# Patient Record
Sex: FEMALE | Race: BLACK OR AFRICAN AMERICAN | Employment: OTHER | URBAN - METROPOLITAN AREA
[De-identification: names, ages, dates, MRNs, and addresses within clinical notes are randomized per-mention and may not be internally consistent; named-entity substitution may affect disease eponyms.]

---

## 2017-09-24 ENCOUNTER — APPOINTMENT (OUTPATIENT)
Dept: GENERAL RADIOLOGY | Age: 67
DRG: 312 | End: 2017-09-24
Attending: EMERGENCY MEDICINE
Payer: MEDICARE

## 2017-09-24 ENCOUNTER — HOSPITAL ENCOUNTER (INPATIENT)
Age: 67
LOS: 3 days | Discharge: HOME HEALTH | DRG: 312 | End: 2017-09-27
Attending: EMERGENCY MEDICINE | Admitting: INTERNAL MEDICINE
Payer: MEDICARE

## 2017-09-24 ENCOUNTER — APPOINTMENT (OUTPATIENT)
Dept: CT IMAGING | Age: 67
DRG: 312 | End: 2017-09-24
Attending: EMERGENCY MEDICINE
Payer: MEDICARE

## 2017-09-24 DIAGNOSIS — R00.1 SYMPTOMATIC BRADYCARDIA: ICD-10-CM

## 2017-09-24 DIAGNOSIS — R55 SYNCOPE AND COLLAPSE: Primary | ICD-10-CM

## 2017-09-24 DIAGNOSIS — R41.82 ALTERED MENTAL STATUS, UNSPECIFIED ALTERED MENTAL STATUS TYPE: ICD-10-CM

## 2017-09-24 DIAGNOSIS — E87.6 HYPOKALEMIA: ICD-10-CM

## 2017-09-24 PROBLEM — G20 PARKINSON'S DISEASE (HCC): Status: ACTIVE | Noted: 2017-09-24

## 2017-09-24 LAB
ALBUMIN SERPL-MCNC: 3.4 G/DL (ref 3.4–5)
ALBUMIN/GLOB SERPL: 0.8 {RATIO} (ref 0.8–1.7)
ALP SERPL-CCNC: 63 U/L (ref 45–117)
ALT SERPL-CCNC: 14 U/L (ref 13–56)
ANION GAP SERPL CALC-SCNC: 10 MMOL/L (ref 3–18)
APPEARANCE UR: ABNORMAL
AST SERPL-CCNC: 18 U/L (ref 15–37)
BACTERIA URNS QL MICRO: ABNORMAL /HPF
BASOPHILS # BLD: 0 K/UL (ref 0–0.06)
BASOPHILS NFR BLD: 0 % (ref 0–2)
BILIRUB SERPL-MCNC: 0.7 MG/DL (ref 0.2–1)
BILIRUB UR QL: NEGATIVE
BNP SERPL-MCNC: 111 PG/ML (ref 0–900)
BUN SERPL-MCNC: 17 MG/DL (ref 7–18)
BUN/CREAT SERPL: 17 (ref 12–20)
CALCIUM SERPL-MCNC: 8.9 MG/DL (ref 8.5–10.1)
CHLORIDE SERPL-SCNC: 107 MMOL/L (ref 100–108)
CK MB CFR SERPL CALC: 0.7 % (ref 0–4)
CK MB SERPL-MCNC: 1.1 NG/ML (ref 5–25)
CK SERPL-CCNC: 153 U/L (ref 26–192)
CO2 SERPL-SCNC: 24 MMOL/L (ref 21–32)
COLOR UR: YELLOW
CREAT SERPL-MCNC: 1.02 MG/DL (ref 0.6–1.3)
DIFFERENTIAL METHOD BLD: ABNORMAL
EOSINOPHIL # BLD: 0 K/UL (ref 0–0.4)
EOSINOPHIL NFR BLD: 1 % (ref 0–5)
EPITH CASTS URNS QL MICRO: ABNORMAL /LPF (ref 0–5)
ERYTHROCYTE [DISTWIDTH] IN BLOOD BY AUTOMATED COUNT: 13.3 % (ref 11.6–14.5)
GLOBULIN SER CALC-MCNC: 4.2 G/DL (ref 2–4)
GLUCOSE BLD STRIP.AUTO-MCNC: 145 MG/DL (ref 70–110)
GLUCOSE SERPL-MCNC: 79 MG/DL (ref 74–99)
GLUCOSE UR STRIP.AUTO-MCNC: NEGATIVE MG/DL
HCT VFR BLD AUTO: 36.9 % (ref 35–45)
HGB BLD-MCNC: 12 G/DL (ref 12–16)
HGB UR QL STRIP: NEGATIVE
HYALINE CASTS URNS QL MICRO: ABNORMAL /LPF (ref 0–2)
KETONES UR QL STRIP.AUTO: NEGATIVE MG/DL
LEUKOCYTE ESTERASE UR QL STRIP.AUTO: ABNORMAL
LYMPHOCYTES # BLD: 1.4 K/UL (ref 0.9–3.6)
LYMPHOCYTES NFR BLD: 36 % (ref 21–52)
MAGNESIUM SERPL-MCNC: 1.9 MG/DL (ref 1.6–2.6)
MCH RBC QN AUTO: 28.7 PG (ref 24–34)
MCHC RBC AUTO-ENTMCNC: 32.5 G/DL (ref 31–37)
MCV RBC AUTO: 88.3 FL (ref 74–97)
MONOCYTES # BLD: 0.3 K/UL (ref 0.05–1.2)
MONOCYTES NFR BLD: 7 % (ref 3–10)
MUCOUS THREADS URNS QL MICRO: ABNORMAL /LPF
NEUTS SEG # BLD: 2.2 K/UL (ref 1.8–8)
NEUTS SEG NFR BLD: 56 % (ref 40–73)
NITRITE UR QL STRIP.AUTO: NEGATIVE
PH UR STRIP: >8.5 [PH] (ref 5–8)
PLATELET # BLD AUTO: 153 K/UL (ref 135–420)
PMV BLD AUTO: 11.2 FL (ref 9.2–11.8)
POTASSIUM SERPL-SCNC: 2.5 MMOL/L (ref 3.5–5.5)
PROT SERPL-MCNC: 7.6 G/DL (ref 6.4–8.2)
PROT UR STRIP-MCNC: NEGATIVE MG/DL
RBC # BLD AUTO: 4.18 M/UL (ref 4.2–5.3)
RBC #/AREA URNS HPF: ABNORMAL /HPF (ref 0–5)
SODIUM SERPL-SCNC: 141 MMOL/L (ref 136–145)
SP GR UR REFRACTOMETRY: 1.02 (ref 1–1.03)
TROPONIN I SERPL-MCNC: 0.12 NG/ML (ref 0–0.04)
UROBILINOGEN UR QL STRIP.AUTO: 1 EU/DL (ref 0.2–1)
WBC # BLD AUTO: 3.9 K/UL (ref 4.6–13.2)
WBC URNS QL MICRO: ABNORMAL /HPF (ref 0–4)

## 2017-09-24 PROCEDURE — 93005 ELECTROCARDIOGRAM TRACING: CPT

## 2017-09-24 PROCEDURE — 82550 ASSAY OF CK (CPK): CPT | Performed by: EMERGENCY MEDICINE

## 2017-09-24 PROCEDURE — 80053 COMPREHEN METABOLIC PANEL: CPT | Performed by: EMERGENCY MEDICINE

## 2017-09-24 PROCEDURE — 82962 GLUCOSE BLOOD TEST: CPT

## 2017-09-24 PROCEDURE — 70450 CT HEAD/BRAIN W/O DYE: CPT

## 2017-09-24 PROCEDURE — 81001 URINALYSIS AUTO W/SCOPE: CPT | Performed by: EMERGENCY MEDICINE

## 2017-09-24 PROCEDURE — 74011000258 HC RX REV CODE- 258: Performed by: EMERGENCY MEDICINE

## 2017-09-24 PROCEDURE — 87040 BLOOD CULTURE FOR BACTERIA: CPT | Performed by: EMERGENCY MEDICINE

## 2017-09-24 PROCEDURE — 65660000000 HC RM CCU STEPDOWN

## 2017-09-24 PROCEDURE — 74011250636 HC RX REV CODE- 250/636: Performed by: EMERGENCY MEDICINE

## 2017-09-24 PROCEDURE — 74011250637 HC RX REV CODE- 250/637: Performed by: EMERGENCY MEDICINE

## 2017-09-24 PROCEDURE — 74011250637 HC RX REV CODE- 250/637: Performed by: INTERNAL MEDICINE

## 2017-09-24 PROCEDURE — 85025 COMPLETE CBC W/AUTO DIFF WBC: CPT | Performed by: EMERGENCY MEDICINE

## 2017-09-24 PROCEDURE — 96361 HYDRATE IV INFUSION ADD-ON: CPT

## 2017-09-24 PROCEDURE — 96360 HYDRATION IV INFUSION INIT: CPT

## 2017-09-24 PROCEDURE — 83735 ASSAY OF MAGNESIUM: CPT | Performed by: EMERGENCY MEDICINE

## 2017-09-24 PROCEDURE — 71010 XR CHEST SNGL V: CPT

## 2017-09-24 PROCEDURE — 99285 EMERGENCY DEPT VISIT HI MDM: CPT

## 2017-09-24 PROCEDURE — 83880 ASSAY OF NATRIURETIC PEPTIDE: CPT | Performed by: EMERGENCY MEDICINE

## 2017-09-24 RX ORDER — POTASSIUM CHLORIDE 20 MEQ/1
40 TABLET, EXTENDED RELEASE ORAL 3 TIMES DAILY
Status: DISCONTINUED | OUTPATIENT
Start: 2017-09-24 | End: 2017-09-24

## 2017-09-24 RX ORDER — POTASSIUM CHLORIDE 20 MEQ/1
40 TABLET, EXTENDED RELEASE ORAL
Status: DISCONTINUED | OUTPATIENT
Start: 2017-09-24 | End: 2017-09-24

## 2017-09-24 RX ORDER — GUAIFENESIN 100 MG/5ML
81 LIQUID (ML) ORAL DAILY
COMMUNITY

## 2017-09-24 RX ORDER — MEMANTINE HYDROCHLORIDE 10 MG/1
10 TABLET ORAL 2 TIMES DAILY
COMMUNITY
End: 2018-01-03

## 2017-09-24 RX ORDER — HEPARIN SODIUM 5000 [USP'U]/ML
5000 INJECTION, SOLUTION INTRAVENOUS; SUBCUTANEOUS EVERY 8 HOURS
Status: DISCONTINUED | OUTPATIENT
Start: 2017-09-24 | End: 2017-09-27 | Stop reason: HOSPADM

## 2017-09-24 RX ORDER — SODIUM CHLORIDE 0.9 % (FLUSH) 0.9 %
5-10 SYRINGE (ML) INJECTION AS NEEDED
Status: DISCONTINUED | OUTPATIENT
Start: 2017-09-24 | End: 2017-09-27 | Stop reason: HOSPADM

## 2017-09-24 RX ORDER — POTASSIUM CHLORIDE 7.45 MG/ML
10 INJECTION INTRAVENOUS
Status: DISPENSED | OUTPATIENT
Start: 2017-09-24 | End: 2017-09-24

## 2017-09-24 RX ORDER — HYDROCHLOROTHIAZIDE 12.5 MG/1
12.5 CAPSULE ORAL DAILY
COMMUNITY
End: 2017-09-27

## 2017-09-24 RX ORDER — ASPIRIN 325 MG
325 TABLET ORAL DAILY
Status: DISCONTINUED | OUTPATIENT
Start: 2017-09-25 | End: 2017-09-27 | Stop reason: HOSPADM

## 2017-09-24 RX ORDER — RISPERIDONE 1 MG/1
2 TABLET, FILM COATED ORAL 2 TIMES DAILY
Status: DISCONTINUED | OUTPATIENT
Start: 2017-09-24 | End: 2017-09-27 | Stop reason: HOSPADM

## 2017-09-24 RX ORDER — DONEPEZIL HYDROCHLORIDE 10 MG/1
10 TABLET, FILM COATED ORAL DAILY
COMMUNITY
End: 2017-09-29

## 2017-09-24 RX ORDER — SODIUM CHLORIDE 9 MG/ML
75 INJECTION, SOLUTION INTRAVENOUS CONTINUOUS
Status: DISCONTINUED | OUTPATIENT
Start: 2017-09-24 | End: 2017-09-27 | Stop reason: HOSPADM

## 2017-09-24 RX ORDER — LANOLIN ALCOHOL/MO/W.PET/CERES
325 CREAM (GRAM) TOPICAL
Status: DISCONTINUED | OUTPATIENT
Start: 2017-09-24 | End: 2017-09-27 | Stop reason: HOSPADM

## 2017-09-24 RX ORDER — SODIUM CHLORIDE 0.9 % (FLUSH) 0.9 %
5-10 SYRINGE (ML) INJECTION EVERY 8 HOURS
Status: DISCONTINUED | OUTPATIENT
Start: 2017-09-24 | End: 2017-09-27 | Stop reason: HOSPADM

## 2017-09-24 RX ORDER — CITALOPRAM 20 MG/1
20 TABLET, FILM COATED ORAL
Status: DISCONTINUED | OUTPATIENT
Start: 2017-09-25 | End: 2017-09-27 | Stop reason: HOSPADM

## 2017-09-24 RX ORDER — LISINOPRIL 5 MG/1
TABLET ORAL DAILY
COMMUNITY
End: 2017-10-19

## 2017-09-24 RX ORDER — TRAZODONE HYDROCHLORIDE 50 MG/1
50 TABLET ORAL
Status: DISCONTINUED | OUTPATIENT
Start: 2017-09-24 | End: 2017-09-27 | Stop reason: HOSPADM

## 2017-09-24 RX ORDER — SIMVASTATIN 10 MG/1
TABLET, FILM COATED ORAL
COMMUNITY
End: 2017-10-16 | Stop reason: SDUPTHER

## 2017-09-24 RX ORDER — POTASSIUM CHLORIDE 1.5 G/1.77G
40 POWDER, FOR SOLUTION ORAL
Status: COMPLETED | OUTPATIENT
Start: 2017-09-24 | End: 2017-09-24

## 2017-09-24 RX ADMIN — CEFTRIAXONE SODIUM 2 G: 2 INJECTION, POWDER, FOR SOLUTION INTRAMUSCULAR; INTRAVENOUS at 15:57

## 2017-09-24 RX ADMIN — POTASSIUM CHLORIDE 40 MEQ: 1.5 POWDER, FOR SOLUTION ORAL at 16:06

## 2017-09-24 RX ADMIN — TRAZODONE HYDROCHLORIDE 50 MG: 50 TABLET ORAL at 21:26

## 2017-09-24 RX ADMIN — POTASSIUM CHLORIDE 10 MEQ: 10 INJECTION, SOLUTION INTRAVENOUS at 16:01

## 2017-09-24 RX ADMIN — SODIUM CHLORIDE 1000 ML: 9 INJECTION, SOLUTION INTRAVENOUS at 11:57

## 2017-09-24 RX ADMIN — SODIUM CHLORIDE 125 ML/HR: 900 INJECTION, SOLUTION INTRAVENOUS at 10:40

## 2017-09-24 RX ADMIN — Medication 10 ML: at 21:28

## 2017-09-24 NOTE — ED PROVIDER NOTES
HPI Comments: Brianna Rivera is a 79 y.o. female with hx of HTN, Parkinson's and CVA presenting to the ED via EMS due to a syncopal episode that occurred about 1 hour ago. Daughter at bedside assisting with hx. Daughter states caregiver called her hysterically at 9:30 AM today, stating that pt had fallen in the bathroom. Caregiver states pt had \"her hands on her face, but thought it was because pt was having a BM\". Caregiver heard \"a thump\" and went to check on pt, pt was on the floor. Caregiver reports pt was \"dazed out, slumped, shaking, mouth clamped down, but after a minute pt was able to respond to her questions and tell her she was ok\". Pt states she does not know or remember what happened. Denies smoking or ETOH. Pt has a baseline left-sided tremor. Past Medical History:   Diagnosis Date    Hypertension        No past surgical history on file. Family History:   Problem Relation Age of Onset    Hypertension Mother     Hypertension Father        Social History     Social History    Marital status:      Spouse name: N/A    Number of children: N/A    Years of education: N/A     Occupational History    Not on file. Social History Main Topics    Smoking status: Never Smoker    Smokeless tobacco: Not on file    Alcohol use No    Drug use: No    Sexual activity: Not on file     Other Topics Concern    Not on file     Social History Narrative    No narrative on file         ALLERGIES: Review of patient's allergies indicates no known allergies. Review of Systems   Constitutional: Negative for activity change, fatigue and fever. HENT: Negative for congestion and rhinorrhea. Eyes: Negative for visual disturbance. Respiratory: Negative for shortness of breath. Cardiovascular: Negative for chest pain and palpitations. Gastrointestinal: Negative for abdominal pain, diarrhea, nausea and vomiting. Genitourinary: Negative for dysuria and hematuria. Musculoskeletal: Negative for back pain. Skin: Negative for rash. Neurological: Positive for syncope. Negative for dizziness, weakness and light-headedness. Baseline left-sided tremor    All other systems reviewed and are negative. Vitals:    09/24/17 1021   BP: 136/74   Pulse: (!) 49   Resp: 16   Temp: 98.4 °F (36.9 °C)   SpO2: 100%            Physical Exam   Constitutional: She appears well-developed and well-nourished. No distress. L arm tremor    HENT:   Head: Normocephalic and atraumatic. Right Ear: External ear normal.   Left Ear: External ear normal.   Nose: Nose normal.   Mouth/Throat: Oropharynx is clear and moist.   Eyes: Conjunctivae and EOM are normal. Pupils are equal, round, and reactive to light. No scleral icterus. Neck: Normal range of motion. Neck supple. No JVD present. No tracheal deviation present. No thyromegaly present. Cardiovascular: Regular rhythm, normal heart sounds and intact distal pulses. Exam reveals no gallop and no friction rub. No murmur heard. [place]    Pulmonary/Chest: Effort normal and breath sounds normal. She exhibits no tenderness. Abdominal: Soft. Bowel sounds are normal. She exhibits no distension. There is no tenderness. There is no rebound and no guarding. Musculoskeletal: Normal range of motion. She exhibits no edema or tenderness. Lymphadenopathy:     She has no cervical adenopathy. Neurological: She is alert. No cranial nerve deficit. Coordination normal.   Oriented x 2, follows commands, L arm tremor is chronic, symmetric strength, gait not observed    Skin: Skin is dry. Cool    Psychiatric: She has a normal mood and affect. Her behavior is normal. Judgment and thought content normal.   Supportive daughter at the bedside    Nursing note and vitals reviewed.        MDM  Number of Diagnoses or Management Options  Diagnosis management comments: Pt is a 70yo female with a hx if HTH, dementia, remote hx of CVA presents with complaint of an episode of AMS. Pt was on the toilet and was bearing down per home care. Care team member left and then heard a change. She was slumped over and unresponsive. There was no diaphoresis or pallor noted. Pt began to talk again within minutes. Pt has had an episode of bradycardia and syncope in the past.  Pt in the ED resting HR is approx 50. Will CT head as she is at risk for seizure but suspect this is syncope given the clinical presentation. Will trend her cardiac labs, h/h, and then reevaluate.  Keaton Choi DO 10:45 AM      ED Course       Procedures      Vitals:  Patient Vitals for the past 12 hrs:   Temp Pulse Resp BP SpO2   09/24/17 1021 98.4 °F (36.9 °C) (!) 49 16 136/74 100 %       Medications Ordered:  Medications   0.9% sodium chloride infusion (not administered)   potassium chloride (K-DUR, KLOR-CON) SR tablet 40 mEq (not administered)   potassium chloride 10 mEq in 100 ml IVPB (not administered)   cefTRIAXone (ROCEPHIN) 2 g in 0.9% sodium chloride (MBP/ADV) 50 mL MBP (not administered)   sodium chloride 0.9 % bolus infusion 1,000 mL (1,000 mL IntraVENous New Bag 9/24/17 1157)       Lab Findings:  Recent Results (from the past 12 hour(s))   EKG, 12 LEAD, INITIAL    Collection Time: 09/24/17 10:18 AM   Result Value Ref Range    Ventricular Rate 52 BPM    Atrial Rate 52 BPM    P-R Interval 146 ms    QRS Duration 102 ms    Q-T Interval 504 ms    QTC Calculation (Bezet) 468 ms    Calculated P Axis 80 degrees    Calculated R Axis 29 degrees    Calculated T Axis 56 degrees    Diagnosis       Sinus bradycardia  Moderate voltage criteria for LVH, may be normal variant  Borderline ECG  When compared with ECG of 08-OCT-2014 15:36,  Nonspecific T wave abnormality no longer evident in Lateral leads     CBC WITH AUTOMATED DIFF    Collection Time: 09/24/17 10:25 AM   Result Value Ref Range    WBC 3.9 (L) 4.6 - 13.2 K/uL    RBC 4.18 (L) 4.20 - 5.30 M/uL    HGB 12.0 12.0 - 16.0 g/dL    HCT 36.9 35.0 - 45.0 %    MCV 88.3 74.0 - 97.0 FL    MCH 28.7 24.0 - 34.0 PG    MCHC 32.5 31.0 - 37.0 g/dL    RDW 13.3 11.6 - 14.5 %    PLATELET 597 916 - 240 K/uL    MPV 11.2 9.2 - 11.8 FL    NEUTROPHILS 56 40 - 73 %    LYMPHOCYTES 36 21 - 52 %    MONOCYTES 7 3 - 10 %    EOSINOPHILS 1 0 - 5 %    BASOPHILS 0 0 - 2 %    ABS. NEUTROPHILS 2.2 1.8 - 8.0 K/UL    ABS. LYMPHOCYTES 1.4 0.9 - 3.6 K/UL    ABS. MONOCYTES 0.3 0.05 - 1.2 K/UL    ABS. EOSINOPHILS 0.0 0.0 - 0.4 K/UL    ABS.  BASOPHILS 0.0 0.0 - 0.06 K/UL    DF AUTOMATED     URINALYSIS W/ RFLX MICROSCOPIC    Collection Time: 09/24/17 12:30 PM   Result Value Ref Range    Color YELLOW      Appearance CLOUDY      Specific gravity 1.019 1.005 - 1.030      pH (UA) >8.5 (H) 5.0 - 8.0    Protein NEGATIVE  NEG mg/dL    Glucose NEGATIVE  NEG mg/dL    Ketone NEGATIVE  NEG mg/dL    Bilirubin NEGATIVE  NEG      Blood NEGATIVE  NEG      Urobilinogen 1.0 0.2 - 1.0 EU/dL    Nitrites NEGATIVE  NEG      Leukocyte Esterase MODERATE (A) NEG     URINE MICROSCOPIC ONLY    Collection Time: 09/24/17 12:30 PM   Result Value Ref Range    WBC 4 to 10 0 - 4 /hpf    RBC 0 to 3 0 - 5 /hpf    Epithelial cells 2+ 0 - 5 /lpf    Bacteria 1+ (A) NEG /hpf    Mucus 1+ (A) NEG /lpf    Hyaline cast 0 to 3 0 - 2 /lpf   CARDIAC PANEL,(CK, CKMB & TROPONIN)    Collection Time: 09/24/17 12:53 PM   Result Value Ref Range     26 - 192 U/L    CK - MB 1.1 <3.6 ng/ml    CK-MB Index 0.7 0.0 - 4.0 %    Troponin-I, Qt. 0.12 (H) 0.0 - 0.045 NG/ML   MAGNESIUM    Collection Time: 09/24/17 12:53 PM   Result Value Ref Range    Magnesium 1.9 1.6 - 2.6 mg/dL   METABOLIC PANEL, COMPREHENSIVE    Collection Time: 09/24/17 12:53 PM   Result Value Ref Range    Sodium 141 136 - 145 mmol/L    Potassium 2.5 (LL) 3.5 - 5.5 mmol/L    Chloride 107 100 - 108 mmol/L    CO2 24 21 - 32 mmol/L    Anion gap 10 3.0 - 18 mmol/L    Glucose 79 74 - 99 mg/dL    BUN 17 7.0 - 18 MG/DL    Creatinine 1.02 0.6 - 1.3 MG/DL    BUN/Creatinine ratio 17 12 - 20 GFR est AA >60 >60 ml/min/1.73m2    GFR est non-AA 54 (L) >60 ml/min/1.73m2    Calcium 8.9 8.5 - 10.1 MG/DL    Bilirubin, total 0.7 0.2 - 1.0 MG/DL    ALT (SGPT) 14 13 - 56 U/L    AST (SGOT) 18 15 - 37 U/L    Alk. phosphatase 63 45 - 117 U/L    Protein, total 7.6 6.4 - 8.2 g/dL    Albumin 3.4 3.4 - 5.0 g/dL    Globulin 4.2 (H) 2.0 - 4.0 g/dL    A-G Ratio 0.8 0.8 - 1.7     NT-PRO BNP    Collection Time: 09/24/17 12:53 PM   Result Value Ref Range    NT pro- 0 - 900 PG/ML       EKG Interpretation by ED physician:    10:18 am: Sinus bradycardia. Rate of 52 bpm. No STEMI. X-ray, CT or radiology findings or impressions:  XR CHEST SNGL V   No acute pulmonary disease. Similar sclerosis bilateral humeral heads may be associated with tendinopathy or  avascular necrosis. CT HEAD WO CONT   1. No acute intracranial process. 2.  Similar severe sequela of chronic microvascular ischemic disease and remote  right thalamic infarct since 2014. 3.  Progressed mild to moderate generalized volume loss. Progress notes, consult notes, or additional procedure notes:    Given the bradycardia without rate slowing meds, syncope with electrolyte abnormalities will proceed with admission. 2:00 PM Consult: I discussed care with Dr. Gallo John (Hospitalist). It was a standard discussion including patient history, chief complaint, available diagnostic results, and predicted treatment course. Will admit pt to telemetry. Diagnosis: Syncope, hypokalemia, AMS    Disposition: Admit    Follow-up Information     None           Patient's Medications   Start Taking    No medications on file   Continue Taking    CITALOPRAM (CELEXA) 20 MG TABLET    Take 20 mg by mouth every morning. FERROUS SULFATE (IRON) 325 MG (65 MG IRON) EC TABLET    Take 1 tablet by mouth three (3) times daily (with meals). LISINOPRIL-HYDROCHLOROTHIAZIDE (PRINZIDE, ZESTORETIC) 20-12.5 MG PER TABLET    Take 1 tablet by mouth daily. RISPERIDONE (RISPERDAL) 2 MG TABLET    Take 2 mg by mouth two (2) times a day. TRAZODONE (DESYREL) 50 MG TABLET    Take 50 mg by mouth nightly as needed for Sleep. These Medications have changed    No medications on file   Stop Taking    No medications on file       Scribe Attestation      Ludmila Valente acting as a scribe for and in the presence of Anton Baptiste DO      September 24, 2017 at 11:02 AM       Provider Attestation:      I personally performed the services described in the documentation, reviewed the documentation, as recorded by the scribe in my presence, and it accurately and completely records my words and actions.  September 24, 2017 at 11:02 AM - Anton Baptiste DO

## 2017-09-24 NOTE — IP AVS SNAPSHOT
[address] Patient: Tosha Luther MRN: [id number] Current Discharge Medication List  
  
CONTINUE these medications which have NOT CHANGED Dose & Instructions Dispensing Information Comments Morning Noon Evening Bedtime  
 aspirin 81 mg chewable tablet Your last dose was: Your next dose is:    
   
   
 Dose:  81 mg Take 81 mg by mouth daily. Refills:  0  
     
   
   
   
  
 citalopram 20 mg tablet Commonly known as:  Amezcua Timothy Your last dose was: Your next dose is:    
   
   
 Dose:  20 mg Take 20 mg by mouth every morning. Refills:  0  
     
   
   
   
  
 donepezil 10 mg tablet Commonly known as:  ARICEPT Your last dose was: Your next dose is:    
   
   
 Dose:  10 mg Take 10 mg by mouth daily. Refills:  0  
     
   
   
   
  
 ferrous sulfate 325 mg (65 mg iron) EC tablet Commonly known as:  IRON Your last dose was: Your next dose is:    
   
   
 Dose:  325 mg Take 1 tablet by mouth three (3) times daily (with meals). Quantity:  30 tablet Refills:  0  
     
   
   
   
  
 lisinopril 5 mg tablet Commonly known as:  Harrison Palomo Your last dose was: Your next dose is: Take  by mouth daily. Refills:  0  
     
   
   
   
  
 memantine 10 mg tablet Commonly known as:  Babetta Darmicaela Your last dose was: Your next dose is:    
   
   
 Dose:  10 mg Take 10 mg by mouth two (2) times a day. Refills:  0  
     
   
   
   
  
 risperiDONE 2 mg tablet Commonly known as:  RisperDAL Your last dose was: Your next dose is:    
   
   
 Dose:  2 mg Take 2 mg by mouth two (2) times a day. Refills:  0  
     
   
   
   
  
 simvastatin 10 mg tablet Commonly known as:  ZOCOR Your last dose was: Your next dose is: Take  by mouth nightly. Refills:  0  
     
   
   
   
  
 traZODone 50 mg tablet Commonly known as:  Terrell Calhoun Your last dose was: Your next dose is:    
   
   
 Dose:  50 mg Take 50 mg by mouth nightly as needed for Sleep. Refills:  0 STOP taking these medications   
 hydroCHLOROthiazide 12.5 mg capsule Commonly known as:  MICROZIDE  
   
  
 lisinopril-hydroCHLOROthiazide 20-12.5 mg per tablet Commonly known as:  Fanny Miki

## 2017-09-24 NOTE — Clinical Note
Status[de-identified] Inpatient [101] Type of Bed: Telemetry [19] Inpatient Hospitalization Certified Necessary for the Following Reasons: 3. Patient receiving treatment that can only be provided in an inpatient setting (further clarification in H&P documentation) Admitting Diagnosis: Syncope and collapse [780. 2. ICD-9-CM] Admitting Diagnosis: Bradycardia [567307] Admitting Diagnosis: Hypokalemia [805052] Admitting Physician: Cari Alcocer Attending Physician: Cari Alcocer Estimated Length of Stay: 2 Midnights Discharge Plan[de-identified] Home with Office Follow-up

## 2017-09-24 NOTE — IP AVS SNAPSHOT
[address] [address] [place] Patient: Manju Velez MRN: [id number] PZB:[id number] You are allergic to the following Allergen Reactions Ativan (Lorazepam) Other (comments) Insomnia, visual hallucinations Recent Documentation Weight BMI OB Status Smoking Status 47 kg 20.25 kg/m2 Postmenopausal Never Smoker Unresulted Labs Order Current Status CULTURE, BLOOD Preliminary result CULTURE, BLOOD Preliminary result Emergency Contacts Name Discharge Info Relation Home Work Mobile [address] Thrasher CAREGIVER [3] Spouse [3] [phone] JesusApril  Daughter [21] [phone] About your hospitalization You were admitted on:  September 24, 2017 You last received care in the:  [address] [place] [address] You were discharged on:  September 27, 2017 Unit phone number:  [phone] Why you were hospitalized Your primary diagnosis was:  Not on File Your diagnoses also included:  Syncope And Collapse, Hypokalemia, Bradycardia, Parkinson's Disease (Hcc), Syncope Providers Seen During Your Hospitalizations Provider Role Specialty Primary office phone Ada Gleason MD Attending Provider Emergency Medicine [phone] Bill Coyle MD Attending Provider Internal Medicine [phone] Your Primary Care Physician (PCP) Primary Care Physician Office Phone Office [facility]ax [facility] [phone] Follow-up Information Follow up With Details Comments Contact Info Caroline Guzman MD On 10/4/2017 Appointment was scheduled with Yara Jensen for Wednesday, October 4, 2017 at 10:15 a.m. patient must apprive at office at 9:45 a.m. with ID, Insurance Card and list of medications. Shaila Ramirez [facility] [address] 
[phone] [address]  for home health follow up  [facility]. 
[address] [facility] [address] 
[phone] Ebenezer Crow MD On 10/19/2017 at 11:15am, arrive at 10:45am [address] [facility] [address] [place] [address] 
[phone] Your Appointments Wednesday October 04, 2017 10:15 AM EDT New Patient with Marley Lopez MD  
[facility]) [address], [address] [place] [address]  
[phone] Current Discharge Medication List  
  
CONTINUE these medications which have NOT CHANGED Dose & Instructions Dispensing Information Comments Morning Noon Evening Bedtime  
 aspirin 81 mg chewable tablet Your last dose was: Your next dose is:    
   
   
 Dose:  81 mg Take 81 mg by mouth daily. Refills:  0  
     
   
   
   
  
 citalopram 20 mg tablet Commonly known as:  Lovetta Rape Your last dose was: Your next dose is:    
   
   
 Dose:  20 mg Take 20 mg by mouth every morning. Refills:  0  
     
   
   
   
  
 donepezil 10 mg tablet Commonly known as:  ARICEPT Your last dose was: Your next dose is:    
   
   
 Dose:  10 mg Take 10 mg by mouth daily. Refills:  0  
     
   
   
   
  
 ferrous sulfate 325 mg (65 mg iron) EC tablet Commonly known as:  IRON Your last dose was: Your next dose is:    
   
   
 Dose:  325 mg Take 1 tablet by mouth three (3) times daily (with meals). Quantity:  30 tablet Refills:  0  
     
   
   
   
  
 lisinopril 5 mg tablet Commonly known as:  Aundra Christin Your last dose was: Your next dose is: Take  by mouth daily. Refills:  0  
     
   
   
   
  
 memantine 10 mg tablet Commonly known as:  Cheryl Media Your last dose was:     
   
Your next dose is:    
   
   
 Dose:  10 mg  
 Take 10 mg by mouth two (2) times a day. Refills:  0  
     
   
   
   
  
 risperiDONE 2 mg tablet Commonly known as:  RisperDAL Your last dose was: Your next dose is:    
   
   
 Dose:  2 mg Take 2 mg by mouth two (2) times a day. Refills:  0  
     
   
   
   
  
 simvastatin 10 mg tablet Commonly known as:  ZOCOR Your last dose was: Your next dose is: Take  by mouth nightly. Refills:  0  
     
   
   
   
  
 traZODone 50 mg tablet Commonly known as:  [place] Your last dose was: Your next dose is:    
   
   
 Dose:  50 mg Take 50 mg by mouth nightly as needed for Sleep. Refills:  0 STOP taking these medications   
 hydroCHLOROthiazide 12.5 mg capsule Commonly known as:  MICROZIDE  
   
  
 lisinopril-hydroCHLOROthiazide 20-12.5 mg per tablet Commonly known as:  Zoey Eric Discharge Instructions Fainting: Care Instructions Your Care Instructions When you faint, or pass out, you lose consciousness for a short time. A brief drop in blood flow to the brain often causes it. When you fall or lie down, more blood flows to your brain and you regain consciousness. Emotional stress, pain, or overheatingespecially if you have been standingcan make you faint. In these cases, fainting is usually not serious. But fainting can be a sign of a more serious problem. Your doctor may want you to have more tests to rule out other causes. The treatment you need depends on the reason why you fainted. The doctor has checked you carefully, but problems can develop later. If you notice any problems or new symptoms, get medical treatment right away. Follow-up care is a key part of your treatment and safety. Be sure to make and go to all appointments, and call your doctor if you are having problems.  It's also a good idea to know your test results and keep a list of the medicines you take. How can you care for yourself at home? · Drink plenty of fluids to prevent dehydration. If you have kidney, heart, or liver disease and have to limit fluids, talk with your doctor before you increase your fluid intake. When should you call for help? Call 911 anytime you think you may need emergency care. For example, call if: 
· You have symptoms of a heart problem. These may include: ¨ Chest pain or pressure. ¨ Severe trouble breathing. ¨ A fast or irregular heartbeat. ¨ Lightheadedness or sudden weakness. ¨ Coughing up pink, foamy mucus. ¨ Passing out. After you call 911, the  may tell you to chew 1 adult-strength or 2 to 4 low-dose aspirin. Wait for an ambulance. Do not try to drive yourself. · You have symptoms of a stroke. These may include: 
¨ Sudden numbness, tingling, weakness, or loss of movement in your face, arm, or leg, especially on only one side of your body. ¨ Sudden vision changes. ¨ Sudden trouble speaking. ¨ Sudden confusion or trouble understanding simple statements. ¨ Sudden problems with walking or balance. ¨ A sudden, severe headache that is different from past headaches. · You passed out (lost consciousness) again. Watch closely for changes in your health, and be sure to contact your doctor if: 
· You do not get better as expected. Where can you learn more? Go to http://william-sebastian.info/. Enter O377 in the search box to learn more about \"Fainting: Care Instructions. \" Current as of: March 20, 2017 Content Version: 11.3 © 1014-7504 [employer]. Care instructions adapted under license by CallResto (which disclaims liability or warranty for this information). If you have questions about a medical condition or this instruction, always ask your healthcare professional. [address] any warranty or liability for your use of this information. Bradycardia: Care Instructions Your Care Instructions Bradycardia is a slow heart rate. If your heart beats too slowly, it can't supply your body with enough blood. This can make you weak or dizzy. Or it may make you pass out. Sometimes medicine can cause this problem. If this happens, your doctor may have you adjust one of your medicines. If a medicine is not the problem, your doctor may recommend a pacemaker. It is important to treat bradycardia so that you don't get more serious health problems. Your doctor will want to see you on a routine schedule to make sure that your heartbeat is normal. 
Follow-up care is a key part of your treatment and safety. Be sure to make and go to all appointments, and call your doctor if you are having problems. It's also a good idea to know your test results and keep a list of the medicines you take. How can you care for yourself at home? · Take your medicines exactly as prescribed. Call your doctor if you think you are having a problem with your medicine. If your bradycardia is caused by another disease, your doctor will try to treat the disease. If it is caused by heart medicines, he or she will adjust your medicines. · Make lifestyle changes to improve your heart health. ¨ To control your cholesterol, avoid foods with a lot of fat, saturated fat, or sodium. Try to eat more fiber. And if your doctor says it's okay, get some exercise on most days. ¨ Do not smoke. Smoking can make your heart condition worse. If you need help quitting, talk to your doctor about stop-smoking programs and medicines. These can increase your chances of quitting for good. ¨ Limit alcohol to 2 drinks a day for men and 1 drink a day for women. Too much alcohol can cause health problems. Pacemaker If you have a pacemaker, you will get more specific information about it. Be sure to: · Check your pulse as your doctor tells you. · Have your pacemaker checked as often as your doctor recommends. You may be able to do this over the phone or computer. · Avoid strong magnetic or electrical fields. These include wand metal detectors used in airports, MRIs, welding equipment, and generators. · You will be checked several times right after you get your pacemaker and when it is time to have the battery changed. Batteries last for 5 to 15 years. · You can talk on a cell phone. But keep it 6 inches away from your pacemaker. · Microwaves, TVs, radios, and kitchen and bathroom appliances won't harm you. When should you call for help? Call 911 anytime you think you may need emergency care. For example, call if: 
· You passed out (lost consciousness). · You have symptoms of a heart attack. These may include: ¨ Chest pain or pressure, or a strange feeling in the chest. 
¨ Sweating. ¨ Shortness of breath. ¨ Nausea or vomiting. ¨ Pain, pressure, or a strange feeling in the back, neck, jaw, or upper belly or in one or both shoulders or arms. ¨ Lightheadedness or sudden weakness. ¨ A fast or irregular heartbeat. After you call 911, the  may tell you to chew 1 adult-strength or 2 to 4 low-dose aspirin. Wait for an ambulance. Do not try to drive yourself. · You have symptoms of a stroke. These may include: 
¨ Sudden numbness, tingling, weakness, or loss of movement in your face, arm, or leg, especially on only one side of your body. ¨ Sudden vision changes. ¨ Sudden trouble speaking. ¨ Sudden confusion or trouble understanding simple statements. ¨ Sudden problems with walking or balance. ¨ A sudden, severe headache that is different from past headaches. Call your doctor now or seek immediate medical care if: 
· You are dizzy or lightheaded, or you feel like you may faint. · You have new or increased shortness of breath. · You had a pacemaker implanted and you have signs of infection, such as: ¨ Increased pain, swelling, warmth, or redness. ¨ Red streaks leading from the cut (incision). ¨ Pus draining from the incision. ¨ A fever. Watch closely for changes in your health, and be sure to contact your doctor if you have any problems. Where can you learn more? Go to http://william-sebastian.info/. Enter R302 in the search box to learn more about \"Bradycardia: Care Instructions. \" Current as of: April 3, 2017 Content Version: 11.3 © 5492-4411 [employer]ap[employer]. Care instructions adapted under license by ViaBill (which disclaims liability or warranty for this information). If you have questions about a medical condition or this instruction, always ask your healthcare professional. [address] any warranty or liability for your use of this information. Hypokalemia: Care Instructions Your Care Instructions Hypokalemia (say \"lh-yp-wvs-ROBERT-america-uh\") is a low level of potassium. The heart, muscles, kidneys, and nervous system all need potassium to work well. This problem has many different causes. Kidney problems, diet, and medicines like diuretics and laxatives can cause it. So can vomiting or diarrhea. In some cases, cancer is the cause. Your doctor may do tests to find the cause of your low potassium levels. You may need medicines to bring your potassium levels back to normal. You may also need regular blood tests to check your potassium. If you have very low potassium, you may need intravenous (IV) medicines. You also may need tests to check the electrical activity of your heart. Heart problems caused by low potassium levels can be very serious. Follow-up care is a key part of your treatment and safety. Be sure to make and go to all appointments, and call your doctor if you are having problems. It's also a good idea to know your test results and keep a list of the medicines you take. How can you care for yourself at home? · If your doctor recommends it, eat foods that have a lot of potassium. These include fresh fruits, juices, and vegetables. They also include nuts, beans, and milk. · Be safe with medicines. If your doctor prescribes medicines or potassium supplements, take them exactly as directed. Call your doctor if you have any problems with your medicines. · Get your potassium levels tested as often as your doctor tells you. When should you call for help? Call 911 anytime you think you may need emergency care. For example, call if: 
· You feel like your heart is missing beats. Heart problems caused by low potassium can cause death. · You passed out (lost consciousness). · You have a seizure. Call your doctor now or seek immediate medical care if: 
· You feel weak or unusually tired. · You have severe arm or leg cramps. · You have tingling or numbness. · You feel sick to your stomach, or you vomit. · You have belly cramps. · You feel bloated or constipated. · You have to urinate a lot. · You feel very thirsty most of the time. · You are dizzy or lightheaded, or you feel like you may faint. · You feel depressed, or you lose touch with reality. Watch closely for changes in your health, and be sure to contact your doctor if: 
· You do not get better as expected. Where can you learn more? Go to http://william-sebastian.info/. Enter G358 in the search box to learn more about \"Hypokalemia: Care Instructions. \" Current as of: July 28, 2016 Content Version: 11.3 © 2158-4166 CBLPath. Care instructions adapted under license by Coppertino (which disclaims liability or warranty for this information). If you have questions about a medical condition or this instruction, always ask your healthcare professional. [address] any warranty or liability for your use of this information. Preventing Falls: Care Instructions Your Care Instructions Getting around your home safely can be a challenge if you have injuries or health problems that make it easy for you to fall. Loose rugs and furniture in walkways are among the dangers for many older people who have problems walking or who have poor eyesight. People who have conditions such as arthritis, osteoporosis, or dementia also have to be careful not to fall. You can make your home safer with a few simple measures. Follow-up care is a key part of your treatment and safety. Be sure to make and go to all appointments, and call your doctor if you are having problems. It's also a good idea to know your test results and keep a list of the medicines you take. How can you care for yourself at home? Taking care of yourself · You may get dizzy if you do not drink enough water. To prevent dehydration, drink plenty of fluids, enough so that your urine is light yellow or clear like water. Choose water and other caffeine-free clear liquids. If you have kidney, heart, or liver disease and have to limit fluids, talk with your doctor before you increase the amount of fluids you drink. · Exercise regularly to improve your strength, muscle tone, and balance. Walk if you can. Swimming may be a good choice if you cannot walk easily. · Have your vision and hearing checked each year or any time you notice a change. If you have trouble seeing and hearing, you might not be able to avoid objects and could lose your balance. · Know the side effects of the medicines you take. Ask your doctor or pharmacist whether the medicines you take can affect your balance. Sleeping pills or sedatives can affect your balance. · Limit the amount of alcohol you drink. Alcohol can impair your balance and other senses. · Ask your doctor whether calluses or corns on your feet need to be removed. If you wear loose-fitting shoes because of calluses or corns, you can lose your balance and fall. · Talk to your doctor if you have numbness in your feet. Preventing falls at home · Remove raised doorway thresholds, throw rugs, and clutter. Repair loose carpet or raised areas in the floor. · Move furniture and electrical cords to keep them out of walking paths. · Use nonskid floor wax, and wipe up spills right away, especially on ceramic tile floors. · If you use a walker or cane, put rubber tips on it. If you use crutches, clean the bottoms of them regularly with an abrasive pad, such as steel wool. · Keep your house well lit, especially Dot Jennifer, and outside walkways. Use night-lights in areas such as hallways and bathrooms. Add extra light switches or use remote switches (such as switches that go on or off when you clap your hands) to make it easier to turn lights on if you have to get up during the night. · Install sturdy handrails on stairways. · Move items in your cabinets so that the things you use a lot are on the lower shelves (about waist level). · Keep a cordless phone and a flashlight with new batteries by your bed. If possible, put a phone in each of the main rooms of your house, or carry a cell phone in case you fall and cannot reach a phone. Or, you can wear a device around your neck or wrist. You push a button that sends a signal for help. · Wear low-heeled shoes that fit well and give your feet good support. Use footwear with nonskid soles. Check the heels and soles of your shoes for wear. Repair or replace worn heels or soles. · Do not wear socks without shoes on wood floors. · Walk on the grass when the sidewalks are slippery. If you live in an area that gets snow and ice in the winter, sprinkle salt on slippery steps and sidewalks. Preventing falls in the bath · Install grab bars and nonskid mats inside and outside your shower or tub and near the toilet and sinks. · Use shower chairs and bath benches. · Use a hand-held shower head that will allow you to sit while showering. · Get into a tub or shower by putting the weaker leg in first. Get out of a tub or shower with your strong side first. 
· Repair loose toilet seats and consider installing a raised toilet seat to make getting on and off the toilet easier. · Keep your bathroom door unlocked while you are in the shower. Where can you learn more? Go to http://william-sebastian.info/. Enter 0476 [id number] in the search box to learn more about \"Preventing Falls: Care Instructions. \" Current as of: 2016 Content Version: 11.3 © 8055-9791 [employer]. Care instructions adapted under license by PassionTag (which disclaims liability or warranty for this information). If you have questions about a medical condition or this instruction, always ask your healthcare professional. [address] [address] any warranty or liability for your use of this information. Patient armband removed and shredded MyChart Activation Thank you for requesting access to [employer]. Please follow the instructions below to securely access and download your online medical record. [employer] allows you to send messages to your doctor, view your test results, renew your prescriptions, schedule appointments, and more. How Do I Sign Up? 1. In your internet browser, go to [email or web address] 
2. Click on the First Time User? Click Here link in the Sign In box. You will be redirect to the New Member Sign Up page. 3. Enter your [employer] Access Code exactly as it appears below. You will not need to use this code after youve completed the sign-up process. If you do not sign up before the expiration date, you must request a new code. [employer] Access Code: [email or web address] Expires: 2017  5:22 PM (This is the date your [employer] access code will ) 4.  Enter the last four digits of your Social Security Number (xxxx) and Date of Birth (mm/dd/yyyy) as indicated and click Submit. You will be taken to the next sign-up page. 5. Create a [employer] ID. This will be your [employer] login ID and cannot be changed, so think of one that is secure and easy to remember. 6. Create a [employer] password. You can change your password at any time. 7. Enter your Password Reset Question and Answer. This can be used at a later time if you forget your password. 8. Enter your e-mail address. You will receive e-mail notification when new information is available in [address] 9. Click Sign Up. You can now view and download portions of your medical record. 10. Click the Download Summary menu link to download a portable copy of your medical information. Additional Information If you have questions, please visit the Frequently Asked Questions section of the [employer] website at [email or web address] Remember, [employer] is NOT to be used for urgent needs. For medical emergencies, dial 911. DISCHARGE SUMMARY from Nurse The following personal items are in your possession at time of discharge: 
 
Dental Appliances: None (no teeth) Visual Aid: None Home Medications: Kept at bedside Jewelry: Watch Clothing: None Other Valuables: None PATIENT INSTRUCTIONS: 
 
 
F-face looks uneven A-arms unable to move or move unevenly S-speech slurred or non-existent T-time-call 911 as soon as signs and symptoms begin-DO NOT go Back to bed or wait to see if you get better-TIME IS BRAIN. Warning Signs of HEART ATTACK Call 911 if you have these symptoms: ? Chest discomfort. Most heart attacks involve discomfort in the center of the chest that lasts more than a few minutes, or that goes away and comes back. It can feel like uncomfortable pressure, squeezing, fullness, or pain. ? Discomfort in other areas of the upper body. Symptoms can include pain or discomfort in one or both arms, the back, neck, jaw, or stomach. ? Shortness of breath with or without chest discomfort. ? Other signs may include breaking out in a cold sweat, nausea, or lightheadedness. Don't wait more than five minutes to call [address]! Fast action can save your life. Calling 911 is almost always the fastest way to get lifesaving treatment. Emergency Medical Services staff can begin treatment when they arrive  up to an hour sooner than if someone gets to the hospital by car. The discharge information has been reviewed with the patient. The patient verbalized understanding. Discharge medications reviewed with the patient and appropriate educational materials and side effects teaching were provided. Discharge Orders None [employer] Announcement We are excited to announce that we are making your provider's discharge notes available to you in [employer]. You will see these notes when they are completed and signed by the physician that discharged you from your recent hospital stay. If you have any questions or concerns about any information you see in [employer], please call the Health Information Department where you were seen or reach out to your Primary Care Provider for more information about your plan of care. Introducing [facility]! Edgardo Powers introduces [employer] patient portal. Now you can access parts of your medical record, email your doctor's office, and request medication refills online. 1. In your internet browser, go to [email or web address] 2. Click on the First Time User? Click Here link in the Sign In box.  You will see the New Member Sign Up page. 3. Enter your [employer] [facility] [employer] [facility] Access Code exactly as it appears below. You will not need to use this code after youve completed the sign-up process. If you do not sign up before the expiration date, you must request a new code. · [facility] Access Code: [email or web address] Expires: 12/24/2017  5:22 PM 
 
4. Enter the last four digits of your Social Security Number (xxxx) and Date of Birth (mm/dd/yyyy) as indicated and click Submit. You will be taken to the next sign-up page. 5. Create a [facility] ID. This will be your [facility] login ID and cannot be changed, so think of one that is secure and easy to remember. 6. Create a [facility] password. You can change your password at any time. 7. Enter your Password Reset Question and Answer. This can be used at a later time if you forget your password. 8. Enter your e-mail address. You will receive e-mail notification when new information is available in [address] 9. Click Sign Up. You can now view and download portions of your medical record. 10. Click the Download Summary menu link to download a portable copy of your medical information. If you have questions, please visit the Frequently Asked Questions section of the [facility] website. Remember, [facility] is NOT to be used for urgent needs. For medical emergencies, dial 911. Now available from your iPhone and Android! General Information Please provide this summary of care documentation to your next provider. Patient Signature:  ____________________________________________________________ Date:  ____________________________________________________________  
  
Colleen Artist Provider Signature:  ____________________________________________________________ Date:  ____________________________________________________________

## 2017-09-24 NOTE — H&P
History and Physical    Patient: Dinorah Douglas MRN: [id number]  SSN: [id number]    YOB: 1950  Age: 79 y.o. Sex: female      Yarelis Montana MD    Subjective: Dinorah Douglas is a 79 y.o. female with PMH of parkinson disease , HTN , being admitted for syncope   Patient is poor historian , care takers in room with her     Patient was brought to ED with h/o syncope to day , she denies any chest pain , SOB , h/o abd pain but it resolved , patient was in bathroom and she fell and are giver noticed her and called help . Patient does not recall any of this events . Now she is alert and awake and answering few simple questions . In ED she was noted to have severe bradycardia - 30 to 50s and he K+ level was 2.5     Full CODE   DVT prophylaxis - heparin     Past Medical History:   Diagnosis Date    Hypertension      No past surgical history on file. Family History   Problem Relation Age of Onset    Hypertension Mother     Hypertension Father      Social History   Substance Use Topics    Smoking status: Never Smoker    Smokeless tobacco: Not on file    Alcohol use No      Prior to Admission medications    Medication Sig Start Date End Date Taking? Authorizing Provider   trazodone (DESYREL) 50 mg tablet Take 50 mg by mouth nightly as needed for Sleep. Yarelis Montana MD   risperidone (RISPERDAL) 2 mg tablet Take 2 mg by mouth two (2) times a day. Yarelis Montana MD   citalopram (CELEXA) 20 mg tablet Take 20 mg by mouth every morning. Yarelis Montana MD   lisinopril-hydrochlorothiazide (PRINZIDE, ZESTORETIC) 20-12.5 mg per tablet Take 1 tablet by mouth daily. Yarelis Montana MD   ferrous sulfate (IRON) 325 mg (65 mg iron) EC tablet Take 1 tablet by mouth three (3) times daily (with meals). 10/8/14   RACHEAL Cordova        No Known Allergies    Review of Systems:  A comprehensive review of systems was negative except for that written in the History of Present Illness. Objective:    There is no height or weight on file to calculate BMI.   Vitals:    09/24/17 1021   BP: 136/74   Pulse: (!) 49   Resp: 16   Temp: 98.4 °F (36.9 °C)   SpO2: 100%        Physical Exam:  General appearance - alert, well appearing, and in no distress, oriented to person, place, and time and normal appearing weight  Mental status - alert, oriented to person, place, and time, normal mood, behavior, speech, dress, motor activity, and thought processes  Eyes - pupils equal and reactive, extraocular eye movements intact  Ears - bilateral TM's and external ear canals normal  Nose - normal and patent, no erythema, discharge or polyps  Mouth - mucous membranes moist, pharynx normal without lesions  Neck - supple, no significant adenopathy  Chest - clear to auscultation, no wheezes, rales or rhonchi, symmetric air entry  Heart - normal rate, regular rhythm, normal S1, S2, no murmurs, rubs, clicks or gallops  Abdomen - soft, nontender, nondistended, no masses or organomegaly  Back exam - full range of motion, no tenderness, palpable spasm or pain on motion  Neurological - Parkinson's tremors present   Musculoskeletal - no joint tenderness, deformity or swelling  Extremities - peripheral pulses normal, no pedal edema, no clubbing or cyanosis  Skin - normal coloration and turgor, no rashes, no suspicious skin lesions noted     Assessment:     Hospital Problems  Date Reviewed: 9/24/2017          Codes Class Noted POA    Syncope and collapse ICD-10-CM: R55  ICD-9-CM: 780.2  9/24/2017 Unknown        Hypokalemia ICD-10-CM: E87.6  ICD-9-CM: 276.8  9/24/2017 Unknown        Bradycardia ICD-10-CM: R00.1  ICD-9-CM: 427.89  9/24/2017 Unknown        [facility] ([facility] ICD-10-CM: G20  ICD-9-CM: 332.0  9/24/2017 Unknown        Syncope ICD-10-CM: R55  ICD-9-CM: 780.2  9/24/2017 Unknown              CBC:  Lab Results   Component Value Date/Time    WBC 3.9 09/24/2017 10:25 AM    HGB 12.0 09/24/2017 10:25 AM    HCT 36.9 09/24/2017 10:25 AM    PLATELET 153 09/24/2017 10:25 AM    MCV 88.3 09/24/2017 10:25 AM        CMP:  Lab Results   Component Value Date/Time    Sodium 141 09/24/2017 12:53 PM    Potassium 2.5 09/24/2017 12:53 PM    Chloride 107 09/24/2017 12:53 PM    CO2 24 09/24/2017 12:53 PM    Anion gap 10 09/24/2017 12:53 PM    Glucose 79 09/24/2017 12:53 PM    BUN 17 09/24/2017 12:53 PM    Creatinine 1.02 09/24/2017 12:53 PM    BUN/Creatinine ratio 17 09/24/2017 12:53 PM    GFR est AA >60 09/24/2017 12:53 PM    GFR est non-AA 54 09/24/2017 12:53 PM    Calcium 8.9 09/24/2017 12:53 PM    AST (SGOT) 18 09/24/2017 12:53 PM    Alk. phosphatase 63 09/24/2017 12:53 PM    Protein, total 7.6 09/24/2017 12:53 PM    Albumin 3.4 09/24/2017 12:53 PM    Globulin 4.2 09/24/2017 12:53 PM    A-G Ratio 0.8 09/24/2017 12:53 PM    ALT (SGPT) 14 09/24/2017 12:53 PM        PT/INR  No results found for: INR, PTMR, PTP, PT1, PT2         EKG: No results found for this or any previous visit. CT head   IMPRESSION  IMPRESSION:       1. No acute intracranial process. 2.  Similar severe sequela of chronic microvascular ischemic disease and remote  right thalamic infarct since 2014. 3.  Progressed mild to moderate generalized volume loss.       Chest x ray   ONE VIEW CHEST RADIOGRAPH      INDICATION: Transient altered mental status, syncope versus seizure.     COMPARISON: 10/8/2014.     FINDINGS:      The cardiomediastinal silhouette is within normal limits. The pulmonary vascular  markings are unremarkable. There is no evidence of focal consolidation, pleural  effusion or pneumothorax. Similar irregular regions of sclerosis at the  bilateral humeral head focused at the greater tuberosities.     IMPRESSION  IMPRESSION:     No acute pulmonary disease. Similar sclerosis bilateral humeral heads may be associated with tendinopathy or  avascular necrosis.    Plan:   1 syncope - possible etiology - Bradycardia related , postural hypotension, Acute CVA (unlikely )   2 Bradycardia   3 Hypokalemia   4 parkinsonism   5 UTI - POA     PLAN   - Admit to tele - r/o severe arrhythmias - cardiology consult   May need out patient ambulatory monitoring if arrhythmias are not detected during this admission   - Replace k+ and monitor closely , she has this issue chronically and hence will stop HCtz   - Carotid  Study , MRI of head , ECHO   - Follow thyroid function   - ED will consult neurology   - Treat UTI with IV rocephin     Signed By: Farrah Joyce MD     September 24, 2017

## 2017-09-24 NOTE — ED NOTES
TRANSFER - OUT REPORT:    Verbal report given to [employer] RN (name) on Dom Qureshi  being transferred to [employer] (unit) for routine progression of care       Report consisted of patients Situation, Background, Assessment and   Recommendations(SBAR). Information from the following report(s) SBAR, ED Summary, MAR, Recent Results and Med Rec Status was reviewed with the receiving nurse. Lines:   Peripheral IV 09/24/17 Left Antecubital (Active)   Site Assessment Clean, dry, & intact 9/24/2017 10:28 AM   Phlebitis Assessment 0 9/24/2017 10:28 AM   Infiltration Assessment 0 9/24/2017 10:28 AM   Dressing Status Clean, dry, & intact 9/24/2017 10:28 AM   Dressing Type Transparent 9/24/2017 10:28 AM        Opportunity for questions and clarification was provided.       Patient transported with:   [employer]

## 2017-09-25 ENCOUNTER — APPOINTMENT (OUTPATIENT)
Dept: CT IMAGING | Age: 67
DRG: 312 | End: 2017-09-25
Attending: INTERNAL MEDICINE
Payer: MEDICARE

## 2017-09-25 ENCOUNTER — APPOINTMENT (OUTPATIENT)
Dept: MRI IMAGING | Age: 67
DRG: 312 | End: 2017-09-25
Attending: INTERNAL MEDICINE
Payer: MEDICARE

## 2017-09-25 LAB
ANION GAP SERPL CALC-SCNC: 10 MMOL/L (ref 3–18)
ATRIAL RATE: 52 BPM
BUN SERPL-MCNC: 17 MG/DL (ref 7–18)
BUN/CREAT SERPL: 17 (ref 12–20)
CALCIUM SERPL-MCNC: 8.3 MG/DL (ref 8.5–10.1)
CALCULATED P AXIS, ECG09: 80 DEGREES
CALCULATED R AXIS, ECG10: 29 DEGREES
CALCULATED T AXIS, ECG11: 56 DEGREES
CHLORIDE SERPL-SCNC: 112 MMOL/L (ref 100–108)
CHOLEST SERPL-MCNC: 143 MG/DL
CO2 SERPL-SCNC: 21 MMOL/L (ref 21–32)
CREAT SERPL-MCNC: 1.03 MG/DL (ref 0.6–1.3)
DIAGNOSIS, 93000: NORMAL
ERYTHROCYTE [DISTWIDTH] IN BLOOD BY AUTOMATED COUNT: 13.3 % (ref 11.6–14.5)
EST. AVERAGE GLUCOSE BLD GHB EST-MCNC: 117 MG/DL
GLUCOSE BLD STRIP.AUTO-MCNC: 114 MG/DL (ref 70–110)
GLUCOSE BLD STRIP.AUTO-MCNC: 168 MG/DL (ref 70–110)
GLUCOSE SERPL-MCNC: 85 MG/DL (ref 74–99)
HBA1C MFR BLD: 5.7 % (ref 4.2–5.6)
HCT VFR BLD AUTO: 32.6 % (ref 35–45)
HDLC SERPL-MCNC: 83 MG/DL (ref 40–60)
HDLC SERPL: 1.7 {RATIO} (ref 0–5)
HGB BLD-MCNC: 10.8 G/DL (ref 12–16)
LDLC SERPL CALC-MCNC: 51.2 MG/DL (ref 0–100)
LIPID PROFILE,FLP: ABNORMAL
MCH RBC QN AUTO: 28.9 PG (ref 24–34)
MCHC RBC AUTO-ENTMCNC: 33.1 G/DL (ref 31–37)
MCV RBC AUTO: 87.2 FL (ref 74–97)
P-R INTERVAL, ECG05: 146 MS
PLATELET # BLD AUTO: 184 K/UL (ref 135–420)
PMV BLD AUTO: 12.1 FL (ref 9.2–11.8)
POTASSIUM SERPL-SCNC: 3.1 MMOL/L (ref 3.5–5.5)
Q-T INTERVAL, ECG07: 504 MS
QRS DURATION, ECG06: 102 MS
QTC CALCULATION (BEZET), ECG08: 468 MS
RBC # BLD AUTO: 3.74 M/UL (ref 4.2–5.3)
SODIUM SERPL-SCNC: 143 MMOL/L (ref 136–145)
TRIGL SERPL-MCNC: 44 MG/DL (ref ?–150)
TSH SERPL DL<=0.05 MIU/L-ACNC: 2.27 UIU/ML (ref 0.36–3.74)
VENTRICULAR RATE, ECG03: 52 BPM
VLDLC SERPL CALC-MCNC: 8.8 MG/DL
WBC # BLD AUTO: 5.6 K/UL (ref 4.6–13.2)

## 2017-09-25 PROCEDURE — 74011250637 HC RX REV CODE- 250/637: Performed by: INTERNAL MEDICINE

## 2017-09-25 PROCEDURE — 70450 CT HEAD/BRAIN W/O DYE: CPT

## 2017-09-25 PROCEDURE — 85027 COMPLETE CBC AUTOMATED: CPT | Performed by: INTERNAL MEDICINE

## 2017-09-25 PROCEDURE — 74011250636 HC RX REV CODE- 250/636: Performed by: INTERNAL MEDICINE

## 2017-09-25 PROCEDURE — 36415 COLL VENOUS BLD VENIPUNCTURE: CPT | Performed by: INTERNAL MEDICINE

## 2017-09-25 PROCEDURE — 93306 TTE W/DOPPLER COMPLETE: CPT

## 2017-09-25 PROCEDURE — 93880 EXTRACRANIAL BILAT STUDY: CPT

## 2017-09-25 PROCEDURE — 74011250637 HC RX REV CODE- 250/637: Performed by: EMERGENCY MEDICINE

## 2017-09-25 PROCEDURE — 82962 GLUCOSE BLOOD TEST: CPT

## 2017-09-25 PROCEDURE — 83036 HEMOGLOBIN GLYCOSYLATED A1C: CPT | Performed by: INTERNAL MEDICINE

## 2017-09-25 PROCEDURE — 80061 LIPID PANEL: CPT | Performed by: INTERNAL MEDICINE

## 2017-09-25 PROCEDURE — 65660000000 HC RM CCU STEPDOWN

## 2017-09-25 PROCEDURE — 84443 ASSAY THYROID STIM HORMONE: CPT | Performed by: INTERNAL MEDICINE

## 2017-09-25 PROCEDURE — 70551 MRI BRAIN STEM W/O DYE: CPT

## 2017-09-25 PROCEDURE — 74011250636 HC RX REV CODE- 250/636: Performed by: EMERGENCY MEDICINE

## 2017-09-25 PROCEDURE — 80048 BASIC METABOLIC PNL TOTAL CA: CPT | Performed by: HOSPITALIST

## 2017-09-25 RX ORDER — LORAZEPAM 2 MG/ML
0.5 INJECTION INTRAMUSCULAR ONCE
Status: COMPLETED | OUTPATIENT
Start: 2017-09-25 | End: 2017-09-25

## 2017-09-25 RX ORDER — POTASSIUM CHLORIDE 1.5 G/1.77G
40 POWDER, FOR SOLUTION ORAL
Status: COMPLETED | OUTPATIENT
Start: 2017-09-25 | End: 2017-09-25

## 2017-09-25 RX ORDER — MEMANTINE HYDROCHLORIDE 10 MG/1
10 TABLET ORAL 2 TIMES DAILY
Status: DISCONTINUED | OUTPATIENT
Start: 2017-09-25 | End: 2017-09-27 | Stop reason: HOSPADM

## 2017-09-25 RX ADMIN — RISPERIDONE 2 MG: 1 TABLET ORAL at 17:58

## 2017-09-25 RX ADMIN — Medication 10 ML: at 22:02

## 2017-09-25 RX ADMIN — CITALOPRAM HYDROBROMIDE 20 MG: 20 TABLET ORAL at 10:37

## 2017-09-25 RX ADMIN — RISPERIDONE 2 MG: 1 TABLET ORAL at 10:37

## 2017-09-25 RX ADMIN — Medication 10 ML: at 06:46

## 2017-09-25 RX ADMIN — LORAZEPAM 0.5 MG: 2 INJECTION INTRAMUSCULAR at 22:48

## 2017-09-25 RX ADMIN — HEPARIN SODIUM 5000 UNITS: 5000 INJECTION, SOLUTION INTRAVENOUS; SUBCUTANEOUS at 18:02

## 2017-09-25 RX ADMIN — POTASSIUM CHLORIDE 40 MEQ: 1.5 POWDER, FOR SOLUTION ORAL at 15:35

## 2017-09-25 RX ADMIN — HEPARIN SODIUM 5000 UNITS: 5000 INJECTION, SOLUTION INTRAVENOUS; SUBCUTANEOUS at 10:37

## 2017-09-25 RX ADMIN — Medication 325 MG: at 10:37

## 2017-09-25 RX ADMIN — TRAZODONE HYDROCHLORIDE 50 MG: 50 TABLET ORAL at 22:02

## 2017-09-25 RX ADMIN — ASPIRIN 325 MG ORAL TABLET 325 MG: 325 PILL ORAL at 10:36

## 2017-09-25 RX ADMIN — Medication 325 MG: at 13:22

## 2017-09-25 RX ADMIN — SODIUM CHLORIDE 125 ML/HR: 900 INJECTION, SOLUTION INTRAVENOUS at 06:45

## 2017-09-25 RX ADMIN — HEPARIN SODIUM 5000 UNITS: 5000 INJECTION, SOLUTION INTRAVENOUS; SUBCUTANEOUS at 01:36

## 2017-09-25 RX ADMIN — Medication 325 MG: at 17:58

## 2017-09-25 RX ADMIN — MEMANTINE HYDROCHLORIDE 10 MG: 10 TABLET ORAL at 22:48

## 2017-09-25 NOTE — PROGRESS NOTES
PT eval order received and chart reviewed. Pt currently off floor for testing, will follow up as pt schedule allows. Thank you for this referral. Jossie Travis, PT, DPT.

## 2017-09-25 NOTE — ROUTINE PROCESS
Bedside and Verbal shift change report given to [address] (oncoming nurse) by Severino Gayle. Maldonado RANDLE (offgoing nurse). Report included the following information SBAR, Kardex, Intake/Output, MAR and Recent Results.

## 2017-09-25 NOTE — ROUTINE PROCESS
TRANSFER - IN REPORT:    Verbal report received from CarinaRN(name) on Lizet Payne  being received from ED(unit) for routine progression of care      Report consisted of patients Situation, Background, Assessment and   Recommendations(SBAR). Information from the following report(s) SBAR, Kardex and MAR was reviewed with the receiving nurse. Opportunity for questions and clarification was provided. Assessment completed upon patients arrival to unit and care assumed.

## 2017-09-25 NOTE — PROGRESS NOTES
0566: Attempted dysphagia eval; however, pt currently off unit for testing. 1412: Pt continues to be off unit for testing. Will complete at a later date or as medical condition permits.      Thank you for this referral.    Keyla Ramírez M.S. CCC-SLP/L  Speech-Language Pathologist

## 2017-09-25 NOTE — ROUTINE PROCESS
Report received from [id number] Gulshan Pena in bed resting on rounds. Bed alarm on. No distress noted. See assessment. Call bell in reach.

## 2017-09-25 NOTE — CONSULTS
[facility] - Consult Note    Consultation request by Jamila Rosado MD for advice/opinion related to evaluating Syncope and collapse  Bradycardia  Hypokalemia  Syncope    Date of  Admission: 9/24/2017 10:12 AM   Primary Care Physician:  Yarelis Montana MD     Assessment:     Patient Active Problem List   Diagnosis Code    Syncope and collapse R55    Hypokalemia E87.6    Bradycardia R00.1    Parkinson's disease ([facility] G20    Syncope R55     -Syncope- patient may have had a vasovagal episode while having a BM. She is now at her normal baseline and there does not appear to be any evidence of acute deficits.  -Hypokalemia- will need replacement.  -Abnormal telemetry strips- this appears to be likely related to her Parkinson's disease with tremor causing the deep artifact. This is due to the patients tremor. Plan:     Patient back in her normal baseline, and there does not appear to be any noted deficits that are new. The telemetry strips appear to be artifact related to the patients Parkinsons. Echo reveals normal EF 60-65% with no RWMA and mild increase of RVSP. Patient may have had a vasovagal episode on the toilet. Will plan to keep on telemetry for 48 hours and monitor. Replace lytes. Will follow. History of Present Illness: This is a 79 y.o. female admitted for Syncope and collapse  Bradycardia  Hypokalemia  Syncope. Patient complains of:  Patient admitted for syncope when it was discovered that the patient was sitting on the toilet and leaning on her arms. She fell to the floor and was unresponsive for an unknown period of time. The patient is not able to provide an accurate history due to memory problems, but the care giver states that she heard a thump and found her on the floor. The patient denied any other symptoms.       Cardiac risk factors: sedentary life style, post-menopausal      Review of Symptoms:  Except as stated above include:  Constitutional: negative  Respiratory:  negative  Cardiovascular:  negative  Gastrointestinal: negative  Genitourinary:  negative  Musculoskeletal:  Negative  Neurological:  Negative  Dermatological:  Negative  Endocrinological: Negative  Psychological:  Negative    Review of systems not obtained due to patient factors.      Past Medical History:     Past Medical History:   Diagnosis Date    Hypertension          Social History:     Social History     Social History    Marital status:      Spouse name: N/A    Number of children: N/A    Years of education: N/A     Social History Main Topics    Smoking status: Never Smoker    Smokeless tobacco: Not on file    Alcohol use No    Drug use: No    Sexual activity: Not on file     Other Topics Concern    Not on file     Social History Narrative    No narrative on file        Family History:     Family History   Problem Relation Age of Onset    Hypertension Mother     Hypertension Father         Medications:   No Known Allergies     Current Facility-Administered Medications   Medication Dose Route Frequency    potassium chloride (KLOR-CON) packet 40 mEq  40 mEq Oral NOW    0.9% sodium chloride infusion  125 mL/hr IntraVENous CONTINUOUS    citalopram (CELEXA) tablet 20 mg  20 mg Oral 7am    ferrous sulfate tablet 325 mg  325 mg Oral TID WITH MEALS    risperiDONE (RisperDAL) tablet 2 mg  2 mg Oral BID    traZODone (DESYREL) tablet 50 mg  50 mg Oral QHS PRN    sodium chloride (NS) flush 5-10 mL  5-10 mL IntraVENous Q8H    sodium chloride (NS) flush 5-10 mL  5-10 mL IntraVENous PRN    aspirin (ASPIRIN) tablet 325 mg  325 mg Oral DAILY    heparin (porcine) injection 5,000 Units  5,000 Units SubCUTAneous Q8H         Physical Exam:     Visit Vitals    /65 (BP 1 Location: Right arm, BP Patient Position: At rest)    Pulse 73    Temp 98.4 °F (36.9 °C)    Resp 17    Wt 47 kg (103 lb 11.2 oz)    SpO2 100%    BMI 20.25 kg/m2     BP Readings from Last 3 Encounters:   09/25/17 114/65   10/08/14 154/86   09/24/12 (!) 185/96     Pulse Readings from Last 3 Encounters:   09/25/17 73   10/08/14 (!) 56   09/24/12 (!) 54     Wt Readings from Last 3 Encounters:   09/25/17 47 kg (103 lb 11.2 oz)   09/25/17 45.4 kg (100 lb 1.4 oz)   09/24/12 47.6 kg (105 lb)       General:  cooperative, no distress, appears stated age, pleasantly confused  Neck:  no carotid bruit  Lungs:  clear to auscultation bilaterally  Heart:  regular rate and rhythm, S1, S2 normal, no murmur, click, rub or gallop  Abdomen:  abdomen is soft without significant tenderness, masses, organomegaly or guarding  Extremities:  extremities normal, atraumatic, no cyanosis or edema  Skin: Warm and dry.  no hyperpigmentation, vitiligo, or suspicious lesions  Neuro: resting and active tremor on L arm  Psych: difficult to assess given her memory loss     Data Review:     Recent Labs      09/25/17   0222  09/24/17   1025   WBC  5.6  3.9*   HGB  10.8*  12.0   HCT  32.6*  36.9   PLT  184  153     Recent Labs      09/25/17   0222  09/24/17   1253   NA  143  141   K  3.1*  2.5*   CL  112*  107   CO2  21  24   GLU  85  79   BUN  17  17   CREA  1.03  1.02   CA  8.3*  8.9   MG   --   1.9   ALB   --   3.4   SGOT   --   18   ALT   --   14       Results for orders placed or performed during the hospital encounter of 09/24/17   EKG, 12 LEAD, INITIAL   Result Value Ref Range    Ventricular Rate 52 BPM    Atrial Rate 52 BPM    P-R Interval 146 ms    QRS Duration 102 ms    Q-T Interval 504 ms    QTC Calculation (Bezet) 468 ms    Calculated P Axis 80 degrees    Calculated R Axis 29 degrees    Calculated T Axis 56 degrees    Diagnosis       Sinus bradycardia  Moderate voltage criteria for LVH, may be normal variant  Borderline ECG  When compared with ECG of 08-OCT-2014 15:36,  Nonspecific T wave abnormality no longer evident in Lateral leads  Confirmed by Salvatore Rodney MD ([id number]) on 9/25/2017 9:53:14 AM         All Cardiac Markers in the last 24 hours:  No results found for: CPK, CK, CKMMB, CKMB, RCK3, CKMBT, CKNDX, CKND1, MELINDA, TROPT, TROIQ, CHRISS, TROPT, TNIPOC, BNP, BNPP    Last Lipid:    Lab Results   Component Value Date/Time    Cholesterol, total 143 09/25/2017 02:22 AM    HDL Cholesterol 83 09/25/2017 02:22 AM    LDL, calculated 51.2 09/25/2017 02:22 AM    Triglyceride 44 09/25/2017 02:22 AM    CHOL/HDL Ratio 1.7 09/25/2017 02:22 AM       Signed By: RACHEAL Fuentes     September 25, 2017

## 2017-09-25 NOTE — ROUTINE PROCESS
2014 Assumed care of patient from off going nurse. Patient resting in bed. No distress noted. Call bell within reach, siderails up x 3, bed in lowest position, and patient instructed to use call bell for assistance. Will continue to monitor. Family at bedside. Bed alarm activated.    0706 Bedside and Verbal shift change report given to Lynn Okeefe RN (oncoming nurse) by Satish Leija RN (offgoing nurse). Report included the following information Kardex, Intake/Output, MAR and Recent Results.

## 2017-09-25 NOTE — PROGRESS NOTES
[facility]  Progress Note    Patient: Carlos Ly Age: 79 y.o. : 1950 MR#: [id number] SSN: [id number]  Date: 2017     Subjective:     Patient sitting in bed in NAD, awake, follows simple commands, daughter and family at bedside    Assessment/Plan:     1- Syncope  2-parkinsons disease with tremor  3- ? H/o CVA  4- Likely has dementia    PLAN  Cardio following, monitor orthostatics  Monitor on tele  On asa  PT, OT  D/w patient and daughter, daughter states she has medical POA for patient. Advanced care planning:daughter requests full code status for patient.       Case discussed with:  [x]Patient  [x]Family  []Nursing  []Case Management  DVT Prophylaxis:  []Lovenox  []Hep SQ  []SCDs  []Coumadin   []On Heparin gtt    Objective:   VS:   Visit Vitals    /65 (BP 1 Location: Right arm, BP Patient Position: At rest)    Pulse 73    Temp 98.4 °F (36.9 °C)    Resp 17    Wt 47 kg (103 lb 11.2 oz)    SpO2 100%    BMI 20.25 kg/m2      Tmax/24hrs: Temp (24hrs), Av.6 °F (37 °C), Min:98.1 °F (36.7 °C), Max:99.2 °F (37.3 °C)    Intake/Output Summary (Last 24 hours) at 17 1317  Last data filed at 17 1044   Gross per 24 hour   Intake          1977.92 ml   Output             1300 ml   Net           677.92 ml       General:  Awake, likely has dementia  Cardiovascular:  S1S2+, RRR  Pulmonary:  CTA b/l  GI:  Soft, BS+, NT, ND  Extremities:  No edema  + tremor    Labs:    Recent Results (from the past 24 hour(s))   CULTURE, BLOOD    Collection Time: 17  3:15 PM   Result Value Ref Range    Special Requests: NO SPECIAL REQUESTS      Culture result: NO GROWTH AFTER 15 HOURS     CULTURE, BLOOD    Collection Time: 17  3:30 PM   Result Value Ref Range    Special Requests: NO SPECIAL REQUESTS      Culture result: NO GROWTH AFTER 15 HOURS     GLUCOSE, POC    Collection Time: 17  9:51 PM   Result Value Ref Range    Glucose (POC) 145 (H) 70 - 110 mg/dL   TSH 3RD GENERATION    Collection Time: 09/25/17  2:22 AM   Result Value Ref Range    TSH 2.27 0.36 - 3.74 uIU/mL   LIPID PANEL    Collection Time: 09/25/17  2:22 AM   Result Value Ref Range    LIPID PROFILE          Cholesterol, total 143 <200 MG/DL    Triglyceride 44 <150 MG/DL    HDL Cholesterol 83 (H) 40 - 60 MG/DL    LDL, calculated 51.2 0 - 100 MG/DL    VLDL, calculated 8.8 MG/DL    CHOL/HDL Ratio 1.7 0 - 5.0     HEMOGLOBIN A1C WITH EAG    Collection Time: 09/25/17  2:22 AM   Result Value Ref Range    Hemoglobin A1c 5.7 (H) 4.2 - 5.6 %    Est. average glucose 117 mg/dL   CBC W/O DIFF    Collection Time: 09/25/17  2:22 AM   Result Value Ref Range    WBC 5.6 4.6 - 13.2 K/uL    RBC 3.74 (L) 4.20 - 5.30 M/uL    HGB 10.8 (L) 12.0 - 16.0 g/dL    HCT 32.6 (L) 35.0 - 45.0 %    MCV 87.2 74.0 - 97.0 FL    MCH 28.9 24.0 - 34.0 PG    MCHC 33.1 31.0 - 37.0 g/dL    RDW 13.3 11.6 - 14.5 %    PLATELET 744 808 - 600 K/uL    MPV 12.1 (H) 9.2 - 94.1 FL   METABOLIC PANEL, BASIC    Collection Time: 09/25/17  2:22 AM   Result Value Ref Range    Sodium 143 136 - 145 mmol/L    Potassium 3.1 (L) 3.5 - 5.5 mmol/L    Chloride 112 (H) 100 - 108 mmol/L    CO2 21 21 - 32 mmol/L    Anion gap 10 3.0 - 18 mmol/L    Glucose 85 74 - 99 mg/dL    BUN 17 7.0 - 18 MG/DL    Creatinine 1.03 0.6 - 1.3 MG/DL    BUN/Creatinine ratio 17 12 - 20      GFR est AA >60 >60 ml/min/1.73m2    GFR est non-AA 53 (L) >60 ml/min/1.73m2    Calcium 8.3 (L) 8.5 - 10.1 MG/DL   GLUCOSE, POC    Collection Time: 09/25/17 12:01 PM   Result Value Ref Range    Glucose (POC) 168 (H) 70 - 110 mg/dL       Signed By: Anjana Oconnell MD     September 25, 2017

## 2017-09-25 NOTE — PROGRESS NOTES
Care Management Interventions  PCP Verified by CM: No (Pt was with Dr. Deon Mills, daughter does not want to stay with the practice.)  Mode of Transport at Discharge: Other (see comment) (daughter, April Gracie)  Transition of Care Consult (CM Consult): Discharge Planning (TBD course of hospitalization.  )  Discharge Durable Medical Equipment: No (Has rollator at home that she does not use)  Physical Therapy Consult: Yes  Occupational Therapy Consult: Yes  Speech Therapy Consult: Yes  Current Support Network: Lives with Spouse, Family Lives [place], Has Personal Caregivers (Pt lives with his spouse who is legally blind. Daughter lives nearby. Has 24/7 caregivers in place)  Confirm Follow Up Transport: Family  Plan discussed with Pt/Family/Caregiver: Yes    Patient is a 78 yo female admitted for syncope with unresponsiveness. Patient also with Parkinson's. Patient's uncle, Yue Faria, and daughter/POA, Elizabeth Bowman, are at bedside. Per daughter, patient's address is incorrect in chart. SW updated the chart to correct address on [address].  Patient lives with her spouse in an accessible apartment. She currently has a rollator for ambulation but does not use it. Patient's spouse is legally blind. Patient has PCA care 7 days a week from 9am-5pm.  Daughter stated upon discharge she will also have someone overnight, therefore 24/7 care. She stated patient was seen by Dr. Deon Mills but does not want to stay with the practice now. She is seeking a new PCP and has elected to go with [facility] on [facility].  She was also inquiring about [facility] and SW provided contact information. Patient's daughter assists in ensuring patient is engaged in medical care. She stated the patient has Medicaid for LTC. It is currently not verified in the system.

## 2017-09-25 NOTE — ROUTINE PROCESS
Bedside and Verbal shift change report given to JER Spicer (oncoming nurse) by Chary Cabello (offgoing nurse). Report included the following information SBAR, Kardex and MAR.

## 2017-09-25 NOTE — PROCEDURES
[facility]  *** FINAL REPORT ***    Name: Houston Gilbert  MRN: [id number]    Inpatient  : 1950  HIS Order #: [id number]  [address] [facility] Visit #: [id number]  Date: 25 Sep 2017    TYPE OF TEST: Cerebrovascular Duplex    REASON FOR TEST  Syncope, Hypertension, unspecified    Right Carotid:-             Proximal               Mid                 Distal  cm/s  Systolic  Diastolic  Systolic  Diastolic  Systolic  Diastolic  CCA:     70.1       9.0       58.0      15.0       55.0      10.0  Bulb:  ECA:     73.0       8.0  ICA:     53.0      10.0       52.0      14.0       41.0       5.0  ICA/CCA:  0.9       0.7    ICA Stenosis: Normal    Right Vertebral:-  Finding: Antegrade  Sys:       28.0  Kim:        5.0    Right Subclavian: Normal    Left Carotid:-            Proximal                Mid                 Distal  cm/s  Systolic  Diastolic  Systolic  Diastolic  Systolic  Diastolic  CCA:     67.6       9.0       66.0      15.0       69.0      18.0  Bulb:  ECA:     56.0       5.0  ICA:     52.0       8.0       74.0      16.0       41.0       9.0  ICA/CCA:  1.1       0.9    ICA Stenosis: <50%    Left Vertebral:-  Finding: Antegrade  Sys:       32.0  Kim:        6.0    Left Subclavian: Normal    INTERPRETATION/FINDINGS  Duplex images were obtained using 2-D gray scale, color flow, and  spectral Doppler analysis. 1. No significant stenosis of the right internal carotid artery. 2. <50% stenosis in the left internal carotid artery. 3. No significant stenosis in the external carotid arteries  bilaterally. 4. Antegrade flow in both vertebral arteries. 5. Normal flow in both subclavian arteries. Plaque Morphology:  1. Homogeneous plaque in the bulb and left ICA. ADDITIONAL COMMENTS  No previous study available for comparison. I have personally reviewed the data relevant to the interpretation of  this  study.     TECHNOLOGIST: SANDI Pederson RVS  Signed: 2017 04:37 PM    PHYSICIAN: Karine Kang Libby Otero MD  Signed: 09/26/2017 09:19 AM

## 2017-09-25 NOTE — PROGRESS NOTES
Completed Echocardiogram. Report to follow. Patient to be transported to vascular testing.     Luther Cunha, ANANTH, RDCS

## 2017-09-25 NOTE — PROGRESS NOTES
Attempted eval 2nd time this afternoon, however pt continues to be off floor for testing. Will follow up as pt schedule allows. Thank you for this referral. Corie Flores, PT, DPT.

## 2017-09-25 NOTE — PROGRESS NOTES
conducted an initial consultation and Spiritual Assessment for Tosha Luther, who is a 79 y.o.,female. Patients Primary Language is: Norm Ma. According to the patients EMR Sikh Affiliation is: [facility].     The reason the Patient came to the hospital is:   Patient Active Problem List    Diagnosis Date Noted    Syncope and collapse 09/24/2017    Hypokalemia 09/24/2017    Bradycardia 09/24/2017    Parkinson's disease ([facility] [address] 09/24/2017    Syncope 09/24/2017        The  provided the following Interventions:  Initiated a relationship of care and support. Provided information about Spiritual Care Services. Chart reviewed. The following outcomes where achieved:   confirmed Patient's Sikh Affiliation. Family expressed gratitude for 's visit. Assessment:  Patient does not have any Hoahaoism/cultural needs that will affect patients preferences in health care. Plan:  Chaplains will continue to follow and will provide pastoral care on an as needed/requested basis.  recommends bedside caregivers page  on duty if patient shows signs of acute spiritual or emotional distress.     [address]  ([phone])

## 2017-09-26 LAB
ANION GAP SERPL CALC-SCNC: 9 MMOL/L (ref 3–18)
BASOPHILS # BLD: 0 K/UL (ref 0–0.1)
BASOPHILS NFR BLD: 0 % (ref 0–2)
BUN SERPL-MCNC: 9 MG/DL (ref 7–18)
BUN/CREAT SERPL: 12 (ref 12–20)
CALCIUM SERPL-MCNC: 8.4 MG/DL (ref 8.5–10.1)
CHLORIDE SERPL-SCNC: 111 MMOL/L (ref 100–108)
CO2 SERPL-SCNC: 23 MMOL/L (ref 21–32)
CREAT SERPL-MCNC: 0.78 MG/DL (ref 0.6–1.3)
DIFFERENTIAL METHOD BLD: ABNORMAL
EOSINOPHIL # BLD: 0.1 K/UL (ref 0–0.4)
EOSINOPHIL NFR BLD: 3 % (ref 0–5)
ERYTHROCYTE [DISTWIDTH] IN BLOOD BY AUTOMATED COUNT: 13.4 % (ref 11.6–14.5)
GLUCOSE SERPL-MCNC: 84 MG/DL (ref 74–99)
HCT VFR BLD AUTO: 32.8 % (ref 35–45)
HGB BLD-MCNC: 10.8 G/DL (ref 12–16)
INR PPP: 1.1 (ref 0.8–1.2)
LYMPHOCYTES # BLD: 1.7 K/UL (ref 0.9–3.6)
LYMPHOCYTES NFR BLD: 43 % (ref 21–52)
MAGNESIUM SERPL-MCNC: 1.9 MG/DL (ref 1.6–2.6)
MCH RBC QN AUTO: 28.6 PG (ref 24–34)
MCHC RBC AUTO-ENTMCNC: 32.9 G/DL (ref 31–37)
MCV RBC AUTO: 87 FL (ref 74–97)
MONOCYTES # BLD: 0.4 K/UL (ref 0.05–1.2)
MONOCYTES NFR BLD: 10 % (ref 3–10)
NEUTS SEG # BLD: 1.8 K/UL (ref 1.8–8)
NEUTS SEG NFR BLD: 44 % (ref 40–73)
PLATELET # BLD AUTO: 158 K/UL (ref 135–420)
PMV BLD AUTO: 11.6 FL (ref 9.2–11.8)
POTASSIUM SERPL-SCNC: 3.3 MMOL/L (ref 3.5–5.5)
PROTHROMBIN TIME: 13.8 SEC (ref 11.5–15.2)
RBC # BLD AUTO: 3.77 M/UL (ref 4.2–5.3)
SODIUM SERPL-SCNC: 143 MMOL/L (ref 136–145)
WBC # BLD AUTO: 4.1 K/UL (ref 4.6–13.2)

## 2017-09-26 PROCEDURE — 85610 PROTHROMBIN TIME: CPT | Performed by: EMERGENCY MEDICINE

## 2017-09-26 PROCEDURE — 74011250636 HC RX REV CODE- 250/636: Performed by: EMERGENCY MEDICINE

## 2017-09-26 PROCEDURE — 74011250636 HC RX REV CODE- 250/636: Performed by: INTERNAL MEDICINE

## 2017-09-26 PROCEDURE — 74011250637 HC RX REV CODE- 250/637: Performed by: INTERNAL MEDICINE

## 2017-09-26 PROCEDURE — 80048 BASIC METABOLIC PNL TOTAL CA: CPT | Performed by: EMERGENCY MEDICINE

## 2017-09-26 PROCEDURE — 65660000000 HC RM CCU STEPDOWN

## 2017-09-26 PROCEDURE — 92610 EVALUATE SWALLOWING FUNCTION: CPT

## 2017-09-26 PROCEDURE — 85025 COMPLETE CBC W/AUTO DIFF WBC: CPT | Performed by: EMERGENCY MEDICINE

## 2017-09-26 PROCEDURE — 74011250637 HC RX REV CODE- 250/637: Performed by: HOSPITALIST

## 2017-09-26 PROCEDURE — 97161 PT EVAL LOW COMPLEX 20 MIN: CPT

## 2017-09-26 PROCEDURE — 36415 COLL VENOUS BLD VENIPUNCTURE: CPT | Performed by: EMERGENCY MEDICINE

## 2017-09-26 PROCEDURE — 83735 ASSAY OF MAGNESIUM: CPT | Performed by: EMERGENCY MEDICINE

## 2017-09-26 PROCEDURE — 97530 THERAPEUTIC ACTIVITIES: CPT

## 2017-09-26 RX ORDER — POTASSIUM CHLORIDE 20 MEQ/1
40 TABLET, EXTENDED RELEASE ORAL
Status: COMPLETED | OUTPATIENT
Start: 2017-09-26 | End: 2017-09-26

## 2017-09-26 RX ADMIN — CITALOPRAM HYDROBROMIDE 20 MG: 20 TABLET ORAL at 09:19

## 2017-09-26 RX ADMIN — RISPERIDONE 2 MG: 1 TABLET ORAL at 17:34

## 2017-09-26 RX ADMIN — RISPERIDONE 2 MG: 1 TABLET ORAL at 09:19

## 2017-09-26 RX ADMIN — ASPIRIN 325 MG ORAL TABLET 325 MG: 325 PILL ORAL at 09:18

## 2017-09-26 RX ADMIN — Medication 10 ML: at 21:03

## 2017-09-26 RX ADMIN — Medication 325 MG: at 09:19

## 2017-09-26 RX ADMIN — MEMANTINE HYDROCHLORIDE 10 MG: 10 TABLET ORAL at 17:34

## 2017-09-26 RX ADMIN — HEPARIN SODIUM 5000 UNITS: 5000 INJECTION, SOLUTION INTRAVENOUS; SUBCUTANEOUS at 09:19

## 2017-09-26 RX ADMIN — Medication 10 ML: at 05:13

## 2017-09-26 RX ADMIN — HEPARIN SODIUM 5000 UNITS: 5000 INJECTION, SOLUTION INTRAVENOUS; SUBCUTANEOUS at 17:34

## 2017-09-26 RX ADMIN — SODIUM CHLORIDE 75 ML/HR: 900 INJECTION, SOLUTION INTRAVENOUS at 17:46

## 2017-09-26 RX ADMIN — Medication 325 MG: at 17:34

## 2017-09-26 RX ADMIN — Medication 325 MG: at 12:08

## 2017-09-26 RX ADMIN — SODIUM CHLORIDE 75 ML/HR: 900 INJECTION, SOLUTION INTRAVENOUS at 06:53

## 2017-09-26 RX ADMIN — POTASSIUM CHLORIDE 40 MEQ: 20 TABLET, EXTENDED RELEASE ORAL at 21:03

## 2017-09-26 RX ADMIN — MEMANTINE HYDROCHLORIDE 10 MG: 10 TABLET ORAL at 09:19

## 2017-09-26 RX ADMIN — HEPARIN SODIUM 5000 UNITS: 5000 INJECTION, SOLUTION INTRAVENOUS; SUBCUTANEOUS at 01:02

## 2017-09-26 NOTE — ROUTINE PROCESS
Bedside and Verbal shift change report given to GERARD Choudhary RN (oncoming nurse) by Roddy Okeefe RN (offgoing nurse). Report included the following information SBAR, Kardex, Intake/Output, MAR and Recent Results.

## 2017-09-26 NOTE — PROGRESS NOTES
Patient's family called concerned about patient's change in mental status. Patient's family member tearful. Wanting to speak with MD. Paged on-call MD to notify of patient's family concerns, received telephone order for stat CT Head. Informed family of new orders.

## 2017-09-26 NOTE — PROGRESS NOTES
Problem: Falls - Risk of  Goal: *Absence of Falls  Document Jayde Fall Risk and appropriate interventions in the flowsheet.    Outcome: Progressing Towards Goal  Fall Risk Interventions:  Mobility Interventions: Bed/chair exit alarm     Mentation Interventions: Bed/chair exit alarm, Family/sitter at bedside, More frequent rounding, Reorient patient, Toileting rounds, Adequate sleep, hydration, pain control     Medication Interventions: Bed/chair exit alarm, Patient to call before getting OOB, Teach patient to arise slowly     Elimination Interventions: Call light in reach, Bed/chair exit alarm, Patient to call for help with toileting needs     History of Falls Interventions: Bed/chair exit alarm, Door open when patient unattended

## 2017-09-26 NOTE — PROGRESS NOTES
Family is concerned that patient has not had her Namenda in 2 days. Patient also impulsive and per family she has not slept in two days.  Contacted on-call MD, received telephone order to resume Namenda and one time order of Ativan 0.5mg IV

## 2017-09-26 NOTE — ROUTINE PROCESS
Bedside and Verbal shift change report given to Ronn Harada, RN (oncoming nurse) by Zaki Jay RN (offgoing nurse). Report included the following information SBAR, Kardex, Intake/Output, MAR and Recent Results.

## 2017-09-26 NOTE — PROGRESS NOTES
Problem: Mobility Impaired (Adult and Pediatric)  Goal: *Acute Goals and Plan of Care (Insert Text)  STGs to be addressed within 3 days:  1. Bed mobility: Supine to sit to supine S with HR for meals. 2. Activity tolerance: Tolerate up in chair 1-2 hrs for ADLs. 3. Transfers: Sit to stand to chair S with LRAD for ADLs. LTGs to be addressed within 7 days:  1. Standing/Ambulation Balance: Increase to Good with LRAD for safe transfers and gait. 2. Ambulation: Ambulate > 200 ft. S with LRAD for home mobility. 3. Patient Education: Independent with HEP for home safety. Outcome: Progressing Towards Goal  PHYSICAL THERAPY EVALUATION     Patient: Ria Newsome (2 y.o. female)  Date: 9/26/2017  Primary Diagnosis: Syncope and collapse  Bradycardia  Hypokalemia  Syncope  Precautions:   Fall, Aspiration, Bed Alarm      ASSESSMENT :  Based on the objective data described below, the patient presents to PT with decreased functional mobility with regard to bed mobility, transfers, gait and overall tolerance for activity. Patient's PCA present during this evaluation, reports to PT that patient received set up assist with ADLs, was ambulating without assistive device PTA, only one fall noted per PCA PTA. Per PCA, patient has 24 hr care at home. Patient does have Parkinson's disease, noted pill rolling tremor in B UE (L>R). Patient confused at baseline, however, PCA reports that patient has had increased confusion PTA. Today, patient confused, oriented to self only. Patient required min A for bed mobility, vc's for hand placement for correct sequencing when transitioning to sit EoB. Patient with noted posterior lean, constant verbal/tactile cues required maintaining upright sitting position. Patient transitioned into standing with mod A, significant posterior lean noted in standing as well, constant verbal/tactile cues for weight shift to maintain safe standing.   Unable to initiate ambulation due to poor standing balance. Patient with noted urinary incontinence once standing, required total assistance for alexi-care. Linens were changed once patient was supine in bed, min A required for initiation of rolling technique. Patient with very poor safety awareness at present time, demonstrates moderate impulsivity, limited carry over due to impaired cognition. Patient was placed in semi-reclined position for comfort. PCA remained at bedside, bed alarm placed on patient. Patient would benefit from PT to address above impairments and assist with discharge planning. Patient will benefit from skilled intervention to address the above impairments. Patients rehabilitation potential is considered to be Fair  Factors which may influence rehabilitation potential include:   [ ]         None noted  [X]         Mental ability/status  [X]         Medical condition  [ ]         Home/family situation and support systems  [X]         Safety awareness  [ ]         Pain tolerance/management  [ ]         Other:        PLAN :  Recommendations and Planned Interventions:  [X]           Bed Mobility Training             [X]    Neuromuscular Re-Education  [X]           Transfer Training                   [ ]    Orthotic/Prosthetic Training  [X]           Gait Training                          [ ]    Modalities  [X]           Therapeutic Exercises          [ ]    Edema Management/Control  [X]           Therapeutic Activities            [X]    Patient and Family Training/Education  [ ]           Other (comment):     Frequency/Duration: Patient will be followed by physical therapy daily x 4-7 x week to address goals. Discharge Recommendations: Home Health with 24 hr supervision  Further Equipment Recommendations for Discharge: Per PCA, all equipment needed is currently in home       SUBJECTIVE:   Patient stated Well hello.       OBJECTIVE DATA SUMMARY:       Past Medical History:   Diagnosis Date    Hypertension     No past surgical history on file.  Barriers to Learning/Limitations: yes;  cognitive and altered mental status (Confusion)  Compensate with: Visual Cues, Verbal Cues and Tactile Cues  Prior Level of Function/Home Situation:   Home Situation  Home Environment: Private residence  One/Two Story Residence: One story  # of Interior Steps:  (multi story apartment pt takes elevator)  Living Alone: No  Support Systems: Child(mary beth), Home care staff (pt has 24 hr care)  Patient Expects to be Discharged to[de-identified] Private residence  Current DME Used/Available at Home: [employer] Ayala Walker, rollator  Critical Behavior:  Neurologic State: Alert;Confused  Psychosocial  Patient Behaviors: Calm;Confused; Cooperative  Family  Behaviors: Calm; Cooperative;Supportive  Visitor Behaviors: Appropriate for situation;Calm; Cooperative;Supportive  Needs Expressed: Educational  Purposeful Interaction: Yes  Pt Identified Daily Priority: Clinical issues (comment)  Caritas Process: Nurture loving kindness;Establish trust;Nurture spiritual self;Teaching/learning; Attend basic human needs  Caring Interventions: Reassure; Therapeutic modalities  Reassure: Therapeutic listening; Informing; Acceptance; Instilling adis and hope;Support family;Caring rounds  Therapeutic Modalities: Deep breathing;Humor; Intentional therapeutic touch  Strength:    Strength: Generally decreased, functional (B UE/LE 2/5)  Tone & Sensation:   Tone: Normal (B UE/LE)  Sensation: Intact (B UE/LE intact to LT)   Range Of Motion:  AROM: Generally decreased, functional (B UE/LE)  Functional Mobility:  Bed Mobility:  Rolling: Minimum assistance; Additional time  Supine to Sit: Minimum assistance; Additional time (posterior lean noted)  Sit to Supine: Minimum assistance; Additional time  Scooting: Minimum assistance; Additional time  Transfers:  Sit to Stand: Moderate assistance; Additional time (significant posterior noted), posterior lean  Stand to Sit: Minimum assistance; Additional time  Balance:   Sitting: Impaired; With support  Sitting - Static: Poor (constant support)  Sitting - Dynamic: Poor (constant support)  Standing: Impaired;Pull to stand; With support  Standing - Static: Poor  Standing - Dynamic :  (N/A)  Ambulation/Gait Training:  Ambulation - Level of Assistance:  (N/A-due to patient's poor standing)  Pain:  Pain Scale 1: Numeric (0 - 10)  Pain Intensity 1: 0  Activity Tolerance:   Fair  Please refer to the flowsheet for vital signs taken during this treatment. After treatment:   [ ] Patient left in no apparent distress sitting up in chair  [ ] Patient left sitting on EOB  [X] Patient left in no apparent distress in bed  [ ] Patient declined to be OOB at this time due to   [X] Call bell left within reach  [ ] Nursing notified  [X] Caregiver present  [X] Bed alarm activated      COMMUNICATION/EDUCATION:   [X]         Fall prevention education was provided and the patient/caregiver indicated understanding. [X]         Patient/family have participated as able in goal setting and plan of care. [ ]         Patient/family agree to work toward stated goals and plan of care. [ ]         Patient understands intent and goals of therapy, but is neutral about his/her participation. [ ]         Patient is unable to participate in goal setting and plan of care. Thank you for this referral.  Luana Berg, PT   Time Calculation: 28 mins      G-codes:  Mobility  Current  CK= 40-59%   Goal  CI= 1-19%. The severity rating is based on the Level of Assistance required for Functional Mobility and ADLs.      Eval Complexity: History: LOW Complexity : Zero comorbidities / personal factors that will impact the outcome / POCExam:MEDIUM Complexity : 3 Standardized tests and measures addressing body structure, function, activity limitation and / or participation in recreation  Presentation: LOW Complexity : Stable, uncomplicated  Overall Complexity:LOW

## 2017-09-26 NOTE — ROUTINE PROCESS
Report received from [address] [place] [address]. Reports patient had a difficult night with increased confusion & restless ness. Reports Ativan given with no effect. CT done no change reported. Reports  In to speak with daughter who had increased concern about change. Lying in bed at this time resting quietly. Will continue to monitor. See assessment. Call bell in reach. 0930 Daughter has continued concerns about change in patients condition. Note this AM improved. Oriented x2 person & place, baseline yesterday Follows commands able to carry on conversation. Note  equal. Moves all extremeties purposefully. Smile even. Patient denies pain, weakness , or any other problems at this time. Will continue to monitor.

## 2017-09-26 NOTE — PROGRESS NOTES
[facility] - Progress Note  Admit Date: 9/24/2017     The patient was seen, examined, and independently evaluated and I agree with the below assessment and plan by Candi Pennington PA-C with the following comments. This lady's syncope appears to be vasovagal and simply defecation syncope. She has remained in sinus bradycardia to normal sinus rhythm without pauses or syncope. Would not recommend any further CV workup. The patient does relate that she had a brief prodrome of lightheadedness before she lost consciousness. I explained to her that if she ever had that feeling again she should lie down immediately on the floor if necessary and pull her feet up and wait for 3-4 minutes before trying to get up. If for any reason she couldn't lie down than isometric hand  should be considered. I explained to her that she should also drink plenty of fluid to keep herself well hydrated which older patients frequently don't do. Will sign off for now. Please call with questions or new CV problems. Assessment:     Hospital Problems  Date Reviewed: 9/24/2017          Codes Class Noted POA    Syncope and collapse ICD-10-CM: R55  ICD-9-CM: 780.2  9/24/2017 Unknown        Hypokalemia ICD-10-CM: E87.6  ICD-9-CM: 276.8  9/24/2017 Unknown        Bradycardia ICD-10-CM: R00.1  ICD-9-CM: 427.89  9/24/2017 Unknown        Parkinson's disease ([facility] [address] ICD-10-CM: G20  ICD-9-CM: 332.0  9/24/2017 Unknown        Syncope ICD-10-CM: R55  ICD-9-CM: 780.2  9/24/2017 Unknown              -Syncope- patient may have had a vasovagal episode while having a BM. She is now at her normal baseline and there does not appear to be any evidence of acute deficits.  -Hypokalemia- will need replacement.  -Abnormal telemetry strips- this appears to be likely related to her Parkinson's disease with tremor causing the deep artifact. This is due to the patients tremor.  -Echo with EF 60-65% by echo this admission.     Plan:     No tele events. Hemodynamics stable off antihypertensives. Echo with normal LV function without valvular disease. Continue electrolyte replacement as needed. No further cardiac work-up planned. Subjective:     No tele events.      Objective:      Patient Vitals for the past 8 hrs:   Temp Pulse Resp BP SpO2   09/26/17 1151 97.3 °F (36.3 °C) (!) 54 17 143/78 100 %   09/26/17 0757 97.4 °F (36.3 °C) 62 18 124/53 99 %         Patient Vitals for the past 96 hrs:   Weight   09/25/17 0410 47 kg (103 lb 11.2 oz)                    Intake/Output Summary (Last 24 hours) at 09/26/17 1349  Last data filed at 09/25/17 1859   Gross per 24 hour   Intake             1680 ml   Output               50 ml   Net             1630 ml       Physical Exam:  General:  alert, cooperative, no distress, appears stated age  Neck:  no JVD  Lungs:  clear to auscultation bilaterally  Heart:  regular rate and rhythm  Abdomen:  abdomen is soft without significant tenderness, masses, organomegaly or guarding  Extremities:  extremities normal, atraumatic, no cyanosis or edema    Data Review:     Labs: Results:       Chemistry Recent Labs      09/26/17 0312 09/25/17 0222 09/24/17   1253   GLU  84  85  79   NA  143  143  141   K  3.3*  3.1*  2.5*   CL  111*  112*  107   CO2  23  21  24   BUN  9  17  17   CREA  0.78  1.03  1.02   CA  8.4*  8.3*  8.9   MG  1.9   --   1.9   AGAP  9  10  10   BUCR  12  17  17   AP   --    --   63   TP   --    --   7.6   ALB   --    --   3.4   GLOB   --    --   4.2*   AGRAT   --    --   0.8      CBC w/Diff Recent Labs      09/26/17 0312 09/25/17 0222 09/24/17   1025   WBC  4.1*  5.6  3.9*   RBC  3.77*  3.74*  4.18*   HGB  10.8*  10.8*  12.0   HCT  32.8*  32.6*  36.9   PLT  158  184  153   GRANS  44   --   56   LYMPH  43   --   36   EOS  3   --   1      Cardiac Enzymes No results found for: CPK, CK, CKMMB, CKMB, RCK3, CKMBT, CKNDX, CKND1, MELINDA, TROPT, TROIQ, CHRISS, TROPT, TNIPOC, BNP, BNPP   Coagulation Recent Labs 09/26/17   0312   PTP  13.8   INR  1.1       Lipid Panel Lab Results   Component Value Date/Time    Cholesterol, total 143 09/25/2017 02:22 AM    HDL Cholesterol 83 09/25/2017 02:22 AM    LDL, calculated 51.2 09/25/2017 02:22 AM    VLDL, calculated 8.8 09/25/2017 02:22 AM    Triglyceride 44 09/25/2017 02:22 AM    CHOL/HDL Ratio 1.7 09/25/2017 02:22 AM      BNP No results found for: BNP, BNPP, XBNPT   Liver Enzymes Recent Labs      09/24/17   1253   TP  7.6   ALB  3.4   AP  63   SGOT  18      Digoxin    Thyroid Studies Lab Results   Component Value Date/Time    T4, Total 11.0 01/06/2016 09:57 AM    TSH 2.27 09/25/2017 02:22 AM          Signed By: Geno Michael DO     September 26, 2017

## 2017-09-26 NOTE — PROGRESS NOTES
Problem: Dysphagia (Adult)  Goal: *Acute Goals and Plan of Care (Insert Text)  Patient will:  1. Tolerate PO trials with 0 s/s overt distress in 4/5 trials  2. Utilize compensatory swallow strategies/maneuvers (decrease bite/sip, size/rate, alt. liq/sol) with min cues in 4/5 trials    Rec:   Reg solid with thin liquids  Aspiration precautions  HOB >45 during po intake, remain >30 for 30-45 minutes after po   Small bites/sips; alternate liquid/solid with slow feeding rate   Oral care TID  Meds per pt preference  [address] EVALUATION     Patient: Paula Ovalle (89 y.o. female)  Date: 9/26/2017  Primary Diagnosis: Syncope and collapse  Bradycardia  Hypokalemia  Syncope        Precautions: aspiration         ASSESSMENT :  Based on the objective data described below, the patient presents with oropharyngeal swallow fxn essentially WFL. Strength, ROM, and coordination of the orofacial musculature were all found to be [facility]. Pt edentulous with family reporting that she eats reg solids at home without dentures without difficulty. The pt presented with adequate oral transit times on all consistencies. Mastication time was appropriate. No s/sx of aspiration noted; hyolaryngeal elevation and excursion appeared adequate on all consistencies. No oral stasis noted post swallow. The pt denied globus sensation post swallow. Of note, pt's daughter with concerns that pt has had altered mental status since last night. RN made aware. ST will continue to follow x 1-2 visits to ensure safety of diet tolerance. Patient will benefit from skilled intervention to address the above impairments.   Patients rehabilitation potential is considered to be Good  Factors which may influence rehabilitation potential include:   [X]            Mental ability/status  [X]            Medical condition       PLAN :  Recommendations and Planned Interventions: See above  Frequency/Duration: Patient will be followed by speech-language pathology  X 1-2 more visits to address goals. Discharge Recommendations: [address] and To Be Determined       SUBJECTIVE:   Patient stated This breakfast is pretty good. OBJECTIVE:       Past Medical History:   Diagnosis Date    Hypertension     No past surgical history on file. Prior Level of Function/Home Situation: apartment  Home Situation  Home Environment: [address] [place] [address] Drive: Other (Comment)  # of Interior Steps:  (multi story apartment pt takes elevator)  Living Alone: No  Support Systems: Child(mary beth), Family member(s), Scientologist / adis community  Patient Expects to be Discharged to[de-identified] Apartment  Current DME Used/Available at Home: None  Diet prior to admission: reg with thin  Current Diet:  Reg with thin   Cognitive and Communication Status:  Neurologic State: Alert  Orientation Level: Oriented to person, Oriented to place  Cognition: Follows commands  Oral Assessment:  Oral Assessment  Labial: No impairment  Dentition: Edentulous  Oral Hygiene: Adequate  Lingual: No impairment  Velum: No impairment  Mandible: No impairment  P.O. Trials:  Patient Position: [facility] at [facility]  Vocal quality prior to P.O.: No impairment  Consistency Presented: Thin liquid; Solid;Puree  How Presented: Self-fed/presented;Cup/sip;Spoon;Straw  Bolus Acceptance: No impairment  Bolus Formation/Control: No impairment  Propulsion: No impairment  Oral Residue: None  Initiation of Swallow: No impairment  Laryngeal Elevation: Functional  Aspiration Signs/Symptoms: None  Pharyngeal Phase Characteristics: No impairment, issues, or problems   Effective Modifications: None  Cues for Modifications: None     Oral Phase Severity: No impairment  Pharyngeal Phase Severity : No impairment     GCODESwallowing:  Swallow Current Status CI= 1-19%   Swallow Goal Status CH= 0%     The severity rating is based on the following outcomes:             Clinical Judgment     Pain:  Pt c/o 0/10 pain prior to evaluation. Pt c/o 0/10 pain post evaluation. After treatment:   [ ]            Patient left in no apparent distress sitting up in chair  [X]            Patient left in no apparent distress in bed  [X]            Call bell left within reach  [X]            Nursing notified  [ ]            Caregiver present  [ ]            Bed alarm activated      COMMUNICATION/EDUCATION:   [X]            SLP educated pt with regard to compensatory swallow strategies and                  aspiration/reflux precautions including: small bites/sips,                  alternate liquids/solids, decrease feeding rate, HOB > 45 with all po, and                             upright in bed at 30 degrees after po for at least 45 minutes. [X]            Patient/family have participated as able in goal setting and plan of care.      Thank you for this referral.     Chalino Gaffney M.S. CCC-SLP/L  Speech-Language Pathologist

## 2017-09-26 NOTE — PROGRESS NOTES
[facility] Hospitalist [facility]  Progress Note    Patient: Jean Rain Age: 79 y.o. : 1950 MR#: [id number] SSN: [id number]  Date: 2017     Subjective:     Patient sitting in bed in NAD, awake, follows simple commands. She denies pain anywhere. Did well with [address] [place] [id number] Avenue she lives with her partially blind  who is several years older than she. Neither drives. Assessment/Plan:     1- Syncope - cardiology input appreciated. 2-parkinsons disease with tremor  3- ? H/o CVA  4- Likely has dementia  5. Hypokalemia replete as needed. 6. Full code. Daughter is mPOA    Case discussed with:  [x]Patient  []Family  []Nursing  []Case Management  DVT Prophylaxis:  []Lovenox  []Hep SQ  []SCDs  []Coumadin   []On Heparin gtt    Objective:   VS:   Visit Vitals    /59 (BP 1 Location: Right arm, BP Patient Position: At rest)    Pulse (!) 57    Temp 97.4 °F (36.3 °C)    Resp 17    Wt 47 kg (103 lb 11.2 oz)    SpO2 100%    BMI 20.25 kg/m2      Tmax/24hrs: Temp (24hrs), Av.7 °F (36.5 °C), Min:97.3 °F (36.3 °C), Max:98.5 °F (36.9 °C)      Intake/Output Summary (Last 24 hours) at 17 1932  Last data filed at 17 1736   Gross per 24 hour   Intake              120 ml   Output             1050 ml   Net             -930 ml       General:  Awake, alert.    Cardiovascular:  S1S2+, RRR  Pulmonary:  CTA b/l  GI:  Soft, BS+, NT, ND  Extremities:  No edema  + tremor    Labs:    Recent Results (from the past 24 hour(s))   CBC WITH AUTOMATED DIFF    Collection Time: 17  3:12 AM   Result Value Ref Range    WBC 4.1 (L) 4.6 - 13.2 K/uL    RBC 3.77 (L) 4.20 - 5.30 M/uL    HGB 10.8 (L) 12.0 - 16.0 g/dL    HCT 32.8 (L) 35.0 - 45.0 %    MCV 87.0 74.0 - 97.0 FL    MCH 28.6 24.0 - 34.0 PG    MCHC 32.9 31.0 - 37.0 g/dL    RDW 13.4 11.6 - 14.5 %    PLATELET 022 669 - 299 K/uL    MPV 11.6 9.2 - 11.8 FL    NEUTROPHILS 44 40 - 73 %    LYMPHOCYTES 43 21 - 52 %    MONOCYTES 10 3 - 10 % EOSINOPHILS 3 0 - 5 %    BASOPHILS 0 0 - 2 %    ABS. NEUTROPHILS 1.8 1.8 - 8.0 K/UL    ABS. LYMPHOCYTES 1.7 0.9 - 3.6 K/UL    ABS. MONOCYTES 0.4 0.05 - 1.2 K/UL    ABS. EOSINOPHILS 0.1 0.0 - 0.4 K/UL    ABS.  BASOPHILS 0.0 0.0 - 0.1 K/UL    DF AUTOMATED     METABOLIC PANEL, BASIC    Collection Time: 09/26/17  3:12 AM   Result Value Ref Range    Sodium 143 136 - 145 mmol/L    Potassium 3.3 (L) 3.5 - 5.5 mmol/L    Chloride 111 (H) 100 - 108 mmol/L    CO2 23 21 - 32 mmol/L    Anion gap 9 3.0 - 18 mmol/L    Glucose 84 74 - 99 mg/dL    BUN 9 7.0 - 18 MG/DL    Creatinine 0.78 0.6 - 1.3 MG/DL    BUN/Creatinine ratio 12 12 - 20      GFR est AA >60 >60 ml/min/1.73m2    GFR est non-AA >60 >60 ml/min/1.73m2    Calcium 8.4 (L) 8.5 - 10.1 MG/DL   MAGNESIUM    Collection Time: 09/26/17  3:12 AM   Result Value Ref Range    Magnesium 1.9 1.6 - 2.6 mg/dL   PROTHROMBIN TIME + INR    Collection Time: 09/26/17  3:12 AM   Result Value Ref Range    Prothrombin time 13.8 11.5 - 15.2 sec    INR 1.1 0.8 - 1.2         Signed By: Claudia Antonio MD     September 26, 2017

## 2017-09-27 ENCOUNTER — HOME HEALTH ADMISSION (OUTPATIENT)
Dept: HOME HEALTH SERVICES | Facility: HOME HEALTH | Age: 67
End: 2017-09-27
Payer: MEDICARE

## 2017-09-27 VITALS
WEIGHT: 103.7 LBS | OXYGEN SATURATION: 100 % | BODY MASS INDEX: 20.25 KG/M2 | DIASTOLIC BLOOD PRESSURE: 76 MMHG | TEMPERATURE: 97.1 F | RESPIRATION RATE: 18 BRPM | SYSTOLIC BLOOD PRESSURE: 128 MMHG | HEART RATE: 47 BPM

## 2017-09-27 PROBLEM — E87.6 HYPOKALEMIA: Status: RESOLVED | Noted: 2017-09-24 | Resolved: 2017-09-27

## 2017-09-27 PROBLEM — R55 SYNCOPE AND COLLAPSE: Status: RESOLVED | Noted: 2017-09-24 | Resolved: 2017-09-27

## 2017-09-27 PROBLEM — R55 SYNCOPE: Status: RESOLVED | Noted: 2017-09-24 | Resolved: 2017-09-27

## 2017-09-27 LAB
ANION GAP SERPL CALC-SCNC: 9 MMOL/L (ref 3–18)
BUN SERPL-MCNC: 5 MG/DL (ref 7–18)
BUN/CREAT SERPL: 8 (ref 12–20)
CALCIUM SERPL-MCNC: 8.5 MG/DL (ref 8.5–10.1)
CHLORIDE SERPL-SCNC: 108 MMOL/L (ref 100–108)
CO2 SERPL-SCNC: 23 MMOL/L (ref 21–32)
CREAT SERPL-MCNC: 0.63 MG/DL (ref 0.6–1.3)
GLUCOSE SERPL-MCNC: 104 MG/DL (ref 74–99)
MAGNESIUM SERPL-MCNC: 1.8 MG/DL (ref 1.6–2.6)
POTASSIUM SERPL-SCNC: 3.7 MMOL/L (ref 3.5–5.5)
SODIUM SERPL-SCNC: 140 MMOL/L (ref 136–145)

## 2017-09-27 PROCEDURE — 74011250637 HC RX REV CODE- 250/637: Performed by: INTERNAL MEDICINE

## 2017-09-27 PROCEDURE — 36415 COLL VENOUS BLD VENIPUNCTURE: CPT | Performed by: HOSPITALIST

## 2017-09-27 PROCEDURE — 74011250636 HC RX REV CODE- 250/636: Performed by: EMERGENCY MEDICINE

## 2017-09-27 PROCEDURE — 80048 BASIC METABOLIC PNL TOTAL CA: CPT | Performed by: HOSPITALIST

## 2017-09-27 PROCEDURE — 74011250636 HC RX REV CODE- 250/636: Performed by: INTERNAL MEDICINE

## 2017-09-27 PROCEDURE — 83735 ASSAY OF MAGNESIUM: CPT | Performed by: HOSPITALIST

## 2017-09-27 RX ORDER — TRAZODONE HYDROCHLORIDE 50 MG/1
50 TABLET ORAL
Qty: 25 TAB | Refills: 0 | Status: SHIPPED | OUTPATIENT
Start: 2017-09-27 | End: 2017-10-16 | Stop reason: SDUPTHER

## 2017-09-27 RX ADMIN — CITALOPRAM HYDROBROMIDE 20 MG: 20 TABLET ORAL at 10:52

## 2017-09-27 RX ADMIN — Medication 10 ML: at 05:39

## 2017-09-27 RX ADMIN — RISPERIDONE 2 MG: 1 TABLET ORAL at 10:52

## 2017-09-27 RX ADMIN — Medication 325 MG: at 10:52

## 2017-09-27 RX ADMIN — SODIUM CHLORIDE 75 ML/HR: 900 INJECTION, SOLUTION INTRAVENOUS at 05:39

## 2017-09-27 RX ADMIN — HEPARIN SODIUM 5000 UNITS: 5000 INJECTION, SOLUTION INTRAVENOUS; SUBCUTANEOUS at 01:51

## 2017-09-27 RX ADMIN — HEPARIN SODIUM 5000 UNITS: 5000 INJECTION, SOLUTION INTRAVENOUS; SUBCUTANEOUS at 10:53

## 2017-09-27 RX ADMIN — ASPIRIN 325 MG ORAL TABLET 325 MG: 325 PILL ORAL at 10:52

## 2017-09-27 RX ADMIN — MEMANTINE HYDROCHLORIDE 10 MG: 10 TABLET ORAL at 10:52

## 2017-09-27 NOTE — PROGRESS NOTES
Problem: Falls - Risk of  Goal: *Absence of Falls  Document Jayde Fall Risk and appropriate interventions in the flowsheet.    Outcome: Progressing Towards Goal  Fall Risk Interventions:  Mobility Interventions: Assess mobility with egress test, Bed/chair exit alarm, Communicate number of staff needed for ambulation/transfer, OT consult for ADLs, Patient to call before getting OOB, PT Consult for mobility concerns, PT Consult for assist device competence, Strengthening exercises (ROM-active/passive)     Mentation Interventions: Bed/chair exit alarm, Adequate sleep, hydration, pain control, Door open when patient unattended, Evaluate medications/consider consulting pharmacy, Reorient patient, Toileting rounds     Medication Interventions: Bed/chair exit alarm, Evaluate medications/consider consulting pharmacy     Elimination Interventions: Call light in reach, Bed/chair exit alarm     History of Falls Interventions: Bed/chair exit alarm, Consult care management for discharge planning, Door open when patient unattended, Evaluate medications/consider consulting pharmacy

## 2017-09-27 NOTE — DISCHARGE SUMMARY
Discharge Summary    Patient: Lynn Sheth MRN: [id number]  CSN: [id number]    YOB: 1950  Age: 79 y.o. Sex: female    DOA: 9/24/2017 LOS:  LOS: 3 days   Discharge Date:      Admission Diagnoses: Syncope and collapse  Bradycardia  Hypokalemia  Syncope    Discharge Diagnoses:    Problem List as of 9/27/2017  Date Reviewed: 9/24/2017          Codes Class Noted - Resolved    Bradycardia ICD-10-CM: R00.1  ICD-9-CM: 427.89  9/24/2017 - Present        Park[facility]'s disease ([facility] ICD-10-CM: G20  ICD-9-CM: 332.0  9/24/2017 - Present        RESOLVED: Syncope and collapse ICD-10-CM: R55  ICD-9-CM: 780.2  9/24/2017 - 9/27/2017        RESOLVED: Hypokalemia ICD-10-CM: E87.6  ICD-9-CM: 276.8  9/24/2017 - 9/27/2017        RESOLVED: Syncope ICD-10-CM: R55  ICD-9-CM: 780.2  9/24/2017 - 9/27/2017            Reason for Admission  78 y/o female with PMHx of parkinson disease, hypertension, who presented to the ED with syncope. Patient was a challenging historian, care takers in room with her provided majority of the hx. Patient was in bathroom when she fell over after a bowel movement. and caregiver noticed her and called help. Patient did not recall any of the events, but she denied any recent chest pain, shortness of breath. She reported some recent vague abdominal pain which had resolved. In the ED, she was alert and awake and answering few simple questions. In ED, she was noted to have bradycardia - 30 to 50s; however, upon further review, appeared to be likely related to her Parkinson's disease with tremor causing the deep artifact. K+ level was 2.5. She was admitted to telemetry. Discharge Condition: Good    PHYSICAL EXAM at discharge. Visit Vitals    /76 (BP 1 Location: Left arm, BP Patient Position: At rest)    Pulse (!) 47    Temp 97.1 °F (36.2 °C)    Resp 18    Wt 47 kg (103 lb 11.2 oz)    SpO2 100%    BMI 20.25 kg/m2     General:  Awake, alert. Oriented. Heent: ncat. perrl.  Cardiovascular:  S1S2+, RRR  Pulmonary:  CTA b/l  GI:  Soft, BS+, NT, ND  Extremities:  No edema  + tremor  No rash    Hospital Course:   1. syncope appeared to be vasovagal and simply defecation syncope. She remained in sinus bradycardia to normal sinus rhythm without pauses. Echo unremarkable. no further CV workup. She described a brief prodrome of lightheadedness before she lost consciousness. It was explained to her that if she ever had that feeling again she should lie down immediately on the floor if necessary and pull her feet up and wait for 3-4 minutes before trying to get up. If for any reason she couldn't lie down, then isometric hand  should be considered. She was advised to drink plenty of fluid to keep herself well hydrated. Goal systolic blood pressure 551-723 mmHg. Avoid over diuresis. hctz discontinued. 2. parkinsons disease with tremor. She has never seen a neurologist and has been referred to Dr. Lee Ann Soto. 3. ? H/o CVA; likely dementia. 4. Hypokalemia repleted. Mag ok. hctz stopped. 5. dvt prophylaxis was given in the form of heparin subcut tid. 6. Full code. Daughter is mPOA. Discharge to home with hh. Patient and daughter agree; all questions answered to the best of my ability.     Consults: cardiology Dr Ana Aguilera    Significant Diagnostic Studies: labs:   Recent Results (from the past 24 hour(s))   MAGNESIUM    Collection Time: 09/27/17  3:13 AM   Result Value Ref Range    Magnesium 1.8 1.6 - 2.6 mg/dL   METABOLIC PANEL, BASIC    Collection Time: 09/27/17  3:13 AM   Result Value Ref Range    Sodium 140 136 - 145 mmol/L    Potassium 3.7 3.5 - 5.5 mmol/L    Chloride 108 100 - 108 mmol/L    CO2 23 21 - 32 mmol/L    Anion gap 9 3.0 - 18 mmol/L    Glucose 104 (H) 74 - 99 mg/dL    BUN 5 (L) 7.0 - 18 MG/DL    Creatinine 0.63 0.6 - 1.3 MG/DL    BUN/Creatinine ratio 8 (L) 12 - 20      GFR est AA >60 >60 ml/min/1.73m2    GFR est non-AA >60 >60 ml/min/1.73m2    Calcium 8.5 8.5 - 10.1 MG/DL     IMAGING  CT Results (most recent):    Results from Hospital Encounter encounter on 09/24/17   CT HEAD WO CONT   Narrative CT brain without enhancement     CPT code: 86790    Indication: Dementia and altered mental status. Technique: Unenhanced 5 mm collimation axial images obtained from the skull base  through the vertex. Dose reduction techniques used: Automated exposure control, adjustment of the  mAs and/or kVp according to patient size, standardized low-dose protocol, and/or  iterative reconstruction technique. Comparison: September 24, 2017. Brain MRI of the same date. Findings: There is no intracranial hemorrhage. There is no evidence for mass. Moderate cortical atrophy is present with compensatory ventricular dilatation. The gray-white matter differentiation is acutely preserved. Severe  periventricular and subcortical white matter hypodensities suggest sequela of  chronic small vessel ischemic disease. There are also scattered chronic  appearing lacunar infarctions. Overall, these findings are stable since prior  comparison. No extra-axial fluid collections. The ventricles are symmetric and  midline in position. Vascular structures are symmetric in attenuation. Basilar  cisterns are patent. Sinuses: Some stable frothy secretions in the sphenoid sinuses. Orbits: Normal.  Calvarium:Normal.         Impression Impression:    1. No acute intracranial pathology. 2. No significant interval change from head CT one day prior and brain MRI of  the same date. MRI Results (most recent):    Results from [place] encounter on 09/24/17   MRI BRAIN WO CONT   Narrative MRI of brain, without gadolinium contrast:        INDICATION:    Syncope/fainting. History of Parkinson's disease. History of hypertension.         TECHNIQUE:    Sagittal, axial and coronal multisequence MR images of brain are obtained using  T1 and T2 contrast information's, including diffusion images, FLAIR images,  gradient echo images and additional coronal T1-weighted images, without  gadolinium contrast.    COMPARISON STUDY: CT scan of head without contrast on 9/20/2017. FINDINGS:        On the diffusion images, there is no evidence of acute infarction, acute  ischemic process or restricted diffusion in brain. On the gradient echo images, there is findings of small focus of old or subacute  hemorrhage with hypointense signals in the area of the lateral aspect of left  thalamus, anteromedial aspect of right thalamus, and few similar foci in head of  right caudate nucleus and right lentiform nucleus. These foci correspond to old  lacunar infarctions presumably with minimal old hemorrhage in the areas of old  infarctions. On the FLAIR and T2-weighted images, there are findings of moderate-to-marked  chronic microvascular ischemic changes in periventricular and central white  matter and to the lateral extent subcortical white matter of both cerebral  hemispheres. T2-weighted hyperintense signals appears to involve the upper  cortex of bilateral frontal lobes in the superior frontal gyri. These are  chronic changes. There are findings of old lacunar infarctions in right and left thalami, right  lentiform nucleus, head of right caudate nucleus, posterior portion of left  coronal radiata, and both sides of jacinto of brainstem. There are also findings of  chronic microvascular ischemic changes with probable old lacunar infarctions in  subinsular areas bilaterally. There is no definable focal abnormality in cerebellum but there are findings of  mild atrophy changes in cerebellum. There are findings of mild cortical atrophy changes in the parietal lobes  bilaterally. Cerebral ventricles are mildly prominent, indicating mild central  atrophy. There is no midline shift.     On the coronal T1-weighted images there are findings of moderate-to-marked  atrophy changes in right mesial temporal lobe, involving hippocampal formations. There are also evidence of mild to moderate atrophy changes in the left mesial  temporal lobe. The findings may indicate Alzheimer's disease. Clinical  correlation suggested. No evidence of intracranial mass lesion, subdural hematoma, epidural hematoma or  subdural hygroma. The study demonstrates mild partially empty sella, without any other obvious  diagnostic finding is sella or suprasellar cistern. There is no demonstrable abnormality in both orbits. Visualized paranasal sinuses of both sides appear to be clear. The study demonstrates signal voids, indicating vascular flow in all major  intracranial arteries. 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 There are patent bilateral posterior comminuting  arteries with signal voids, indicating collateral blood flow from bilateral ICAs  to bilateral PCAs. There is no obvious diagnostic finding in internal auditory canals and CP angles  of both sides as on these noncontrast images. No diagnostic finding in the base of skull. Impression IMPRESSION:    1. On the diffusion images, there is no evidence of acute infarction, acute  ischemic process or restricted diffusion in brain. 2.  There are findings of old lacunar infarctions in bilateral thalami, right  lentiform nucleus, right caudate nucleus, and in both sides of jacinto of brainstem  bilaterally as described. 3.  There are findings of moderate to marked chronic microvascular ischemic changes  in white matter of cerebrum. Chronic microvascular ischemic changes appear to  involve the upper frontal cortex bilaterally as well. 4.  Focal lacunar infarction in the posterior portion of left corona radiata. 5.  Evidence of significant and volume loss in the mesial temporal lobes  bilaterally, right greater than left. Alzheimer's disease may not be excluded.   6.  Additional findings as above in body of report. Cerebrovascular Duplex 25 Sep 2017  Duplex images were obtained using 2-D gray scale, color flow, and spectral Doppler analysis. 1. No significant stenosis of the right internal carotid artery. 2. <50% stenosis in the left internal carotid artery. 3. No significant stenosis in the external carotid arteries  bilaterally. 4. Antegrade flow in both vertebral arteries. 5. Normal flow in both subclavian arteries. Plaque Morphology:  1. Homogeneous plaque in the bulb and left ICA. XR Results (most recent):    Results from Hospital Encounter encounter on 09/24/17   XR CHEST SNGL V   Narrative ONE VIEW CHEST RADIOGRAPH     INDICATION: Transient altered mental status, syncope versus seizure. COMPARISON: 10/8/2014. FINDINGS:    The cardiomediastinal silhouette is within normal limits. The pulmonary vascular  markings are unremarkable. There is no evidence of focal consolidation, pleural  effusion or pneumothorax. Similar irregular regions of sclerosis at the  bilateral humeral head focused at the greater tuberosities. Impression IMPRESSION:    No acute pulmonary disease. Similar sclerosis bilateral humeral heads may be associated with tendinopathy or  avascular necrosis.         EKG Results     Procedure 720 Value Units Date/Time    SCANNED CARDIAC RHYTHM STRIP [[id number]] Collected:  09/28/17 1130    Order Status:  Completed Updated:  09/28/17 1211    SCANNED CARDIAC RHYTHM STRIP [[id number]] Collected:  09/27/17 0827    Order Status:  Completed Updated:  09/27/17 1010    SCANNED CARDIAC RHYTHM STRIP [[id number]] Collected:  09/26/17 0820    Order Status:  Completed Updated:  09/26/17 0956    EKG, 12 LEAD, INITIAL [[id number]] Collected:  09/24/17 1018    Order Status:  Completed Updated:  09/25/17 0953     Ventricular Rate 52 BPM      Atrial Rate 52 BPM      P-R Interval 146 ms      QRS Duration 102 ms      Q-T Interval 504 ms      QTC Calculation (Bezet) 468 ms      Calculated P Axis 80 degrees      Calculated R Axis 29 degrees      Calculated T Axis 56 degrees      Diagnosis --     Sinus bradycardia  Moderate voltage criteria for LVH, may be normal variant  Borderline ECG  When compared with ECG of 08-OCT-2014 15:36,  Nonspecific T wave abnormality no longer evident in Lateral leads  Confirmed by Danica Forrest MD ([id number]) on 9/25/2017 9:53:14 AM      EKG, 12 LEAD, INITIAL [[id number]]     Order Status:  Canceled         Transthoracic Echocardiogram 25-Sep-2017  A clinically significant myopathic process is ruled out. There was no significant valvular heart disease. SUMMARY: left ventricle: Systolic function was normal by visual assessment. Ejection fraction was estimated in the range of 60 % to 65 %. No obvious wall motion abnormalities identified in the views obtained. Wall thickness was at the upper limits of normal. Right ventricle: Systolic pressure was mildly increased. Estimated peak pressure was 38 mmHg. Left atrium: The atrium was moderately dilated. Discharge Medications:     Current Discharge Medication List      CONTINUE these medications which have NOT CHANGED    Details   aspirin 81 mg chewable tablet Take 81 mg by mouth daily. simvastatin (ZOCOR) 10 mg tablet Take  by mouth nightly. donepezil (ARICEPT) 10 mg tablet Take 10 mg by mouth daily. memantine (NAMENDA) 10 mg tablet Take 10 mg by mouth two (2) times a day. lisinopril (PRINIVIL, ZESTRIL) 5 mg tablet Take  by mouth daily. trazodone (DESYREL) 50 mg tablet Take 50 mg by mouth nightly as needed for Sleep.      risperidone (RISPERDAL) 2 mg tablet Take 2 mg by mouth two (2) times a day. citalopram (CELEXA) 20 mg tablet Take 20 mg by mouth every morning. ferrous sulfate (IRON) 325 mg (65 mg iron) EC tablet Take 1 tablet by mouth three (3) times daily (with meals).   Qty: 30 tablet, Refills: 0         STOP taking these medications       hydroCHLOROthiazide (MICROZIDE) 12.5 mg capsule Comments: Reason for Stopping:         lisinopril-hydrochlorothiazide (PRINZIDE, ZESTORETIC) 20-12.5 mg per tablet Comments:   Reason for Stopping:               Activity:as per hh for pt and ot. Diet: cardiac    Wound Care: None needed    Follow-up:   1. Dr. Lyn Willis 7 - 10 days. 2. Dr. Alana Renteria 4 weeks.  New patient dx: parkinsons    Minutes spent on discharge: greater than 30

## 2017-09-27 NOTE — DISCHARGE INSTRUCTIONS
Fainting: Care Instructions  Your Care Instructions    When you faint, or pass out, you lose consciousness for a short time. A brief drop in blood flow to the brain often causes it. When you fall or lie down, more blood flows to your brain and you regain consciousness. Emotional stress, pain, or overheating--especially if you have been standing--can make you faint. In these cases, fainting is usually not serious. But fainting can be a sign of a more serious problem. Your doctor may want you to have more tests to rule out other causes. The treatment you need depends on the reason why you fainted. The doctor has checked you carefully, but problems can develop later. If you notice any problems or new symptoms, get medical treatment right away. Follow-up care is a key part of your treatment and safety. Be sure to make and go to all appointments, and call your doctor if you are having problems. It's also a good idea to know your test results and keep a list of the medicines you take. How can you care for yourself at home? · Drink plenty of fluids to prevent dehydration. If you have kidney, heart, or liver disease and have to limit fluids, talk with your doctor before you increase your fluid intake. When should you call for help? Call 911 anytime you think you may need emergency care. For example, call if:  · You have symptoms of a heart problem. These may include:  ¨ Chest pain or pressure. ¨ Severe trouble breathing. ¨ A fast or irregular heartbeat. ¨ Lightheadedness or sudden weakness. ¨ Coughing up pink, foamy mucus. ¨ Passing out. After you call 911, the  may tell you to chew 1 adult-strength or 2 to 4 low-dose aspirin. Wait for an ambulance. Do not try to drive yourself. · You have symptoms of a stroke. These may include:  ¨ Sudden numbness, tingling, weakness, or loss of movement in your face, arm, or leg, especially on only one side of your body. ¨ Sudden vision changes.   ¨ Sudden trouble speaking. ¨ Sudden confusion or trouble understanding simple statements. ¨ Sudden problems with walking or balance. ¨ A sudden, severe headache that is different from past headaches. · You passed out (lost consciousness) again. Watch closely for changes in your health, and be sure to contact your doctor if:  · You do not get better as expected. Where can you learn more? Go to http://william-sebastian.info/. Enter D549 in the search box to learn more about \"Fainting: Care Instructions. \"  Current as of: March 20, 2017  Content Version: 11.3  © 9722-5526 [employer]. Care instructions adapted under license by [employer] (which disclaims liability or warranty for this information). If you have questions about a medical condition or this instruction, always ask your healthcare professional. [address] any warranty or liability for your use of this information. Bradycardia: Care Instructions  Your Care Instructions    Bradycardia is a slow heart rate. If your heart beats too slowly, it can't supply your body with enough blood. This can make you weak or dizzy. Or it may make you pass out. Sometimes medicine can cause this problem. If this happens, your doctor may have you adjust one of your medicines. If a medicine is not the problem, your doctor may recommend a pacemaker. It is important to treat bradycardia so that you don't get more serious health problems. Your doctor will want to see you on a routine schedule to make sure that your heartbeat is normal.  Follow-up care is a key part of your treatment and safety. Be sure to make and go to all appointments, and call your doctor if you are having problems. It's also a good idea to know your test results and keep a list of the medicines you take. How can you care for yourself at home? · Take your medicines exactly as prescribed.  Call your doctor if you think you are having a problem with your medicine. If your bradycardia is caused by another disease, your doctor will try to treat the disease. If it is caused by heart medicines, he or she will adjust your medicines. · Make lifestyle changes to improve your heart health. ¨ To control your cholesterol, avoid foods with a lot of fat, saturated fat, or sodium. Try to eat more fiber. And if your doctor says it's okay, get some exercise on most days. ¨ Do not smoke. Smoking can make your heart condition worse. If you need help quitting, talk to your doctor about stop-smoking programs and medicines. These can increase your chances of quitting for good. ¨ Limit alcohol to 2 drinks a day for men and 1 drink a day for women. Too much alcohol can cause health problems. Pacemaker  If you have a pacemaker, you will get more specific information about it. Be sure to:  · Check your pulse as your doctor tells you. · Have your pacemaker checked as often as your doctor recommends. You may be able to do this over the phone or computer. · Avoid strong magnetic or electrical fields. These include wand metal detectors used in airports, MRIs, welding equipment, and generators. · You will be checked several times right after you get your pacemaker and when it is time to have the battery changed. Batteries last for 5 to 15 years. · You can talk on a cell phone. But keep it 6 inches away from your pacemaker. · Microwaves, TVs, radios, and kitchen and bathroom appliances won't harm you. When should you call for help? Call 911 anytime you think you may need emergency care. For example, call if:  · You passed out (lost consciousness). · You have symptoms of a heart attack. These may include:  ¨ Chest pain or pressure, or a strange feeling in the chest.  ¨ Sweating. ¨ Shortness of breath. ¨ Nausea or vomiting. ¨ Pain, pressure, or a strange feeling in the back, neck, jaw, or upper belly or in one or both shoulders or arms.   ¨ Lightheadedness or sudden weakness. ¨ A fast or irregular heartbeat. After you call 911, the  may tell you to chew 1 adult-strength or 2 to 4 low-dose aspirin. Wait for an ambulance. Do not try to drive yourself. · You have symptoms of a stroke. These may include:  ¨ Sudden numbness, tingling, weakness, or loss of movement in your face, arm, or leg, especially on only one side of your body. ¨ Sudden vision changes. ¨ Sudden trouble speaking. ¨ Sudden confusion or trouble understanding simple statements. ¨ Sudden problems with walking or balance. ¨ A sudden, severe headache that is different from past headaches. Call your doctor now or seek immediate medical care if:  · You are dizzy or lightheaded, or you feel like you may faint. · You have new or increased shortness of breath. · You had a pacemaker implanted and you have signs of infection, such as:  ¨ Increased pain, swelling, warmth, or redness. ¨ Red streaks leading from the cut (incision). ¨ Pus draining from the incision. ¨ A fever. Watch closely for changes in your health, and be sure to contact your doctor if you have any problems. Where can you learn more? Go to http://william-sebastian.info/. Enter M962 in the search box to learn more about \"Bradycardia: Care Instructions. \"  Current as of: April 3, 2017  Content Version: 11.3  © 5855-4965 [employer]. Care instructions adapted under license by [employer] (which disclaims liability or warranty for this information). If you have questions about a medical condition or this instruction, always ask your healthcare professional. [place] [address] any warranty or liability for your use of this information. Hypokalemia: Care Instructions  Your Care Instructions  Hypokalemia (say \"mw-cy-dzt-ROBERT-america-uh\") is a low level of potassium. The heart, muscles, kidneys, and nervous system all need potassium to work well. This problem has many different causes. Kidney problems, diet, and medicines like diuretics and laxatives can cause it. So can vomiting or diarrhea. In some cases, cancer is the cause. Your doctor may do tests to find the cause of your low potassium levels. You may need medicines to bring your potassium levels back to normal. You may also need regular blood tests to check your potassium. If you have very low potassium, you may need intravenous (IV) medicines. You also may need tests to check the electrical activity of your heart. Heart problems caused by low potassium levels can be very serious. Follow-up care is a key part of your treatment and safety. Be sure to make and go to all appointments, and call your doctor if you are having problems. It's also a good idea to know your test results and keep a list of the medicines you take. How can you care for yourself at home? · If your doctor recommends it, eat foods that have a lot of potassium. These include fresh fruits, juices, and vegetables. They also include nuts, beans, and milk. · Be safe with medicines. If your doctor prescribes medicines or potassium supplements, take them exactly as directed. Call your doctor if you have any problems with your medicines. · Get your potassium levels tested as often as your doctor tells you. When should you call for help? Call 911 anytime you think you may need emergency care. For example, call if:  · You feel like your heart is missing beats. Heart problems caused by low potassium can cause death. · You passed out (lost consciousness). · You have a seizure. Call your doctor now or seek immediate medical care if:  · You feel weak or unusually tired. · You have severe arm or leg cramps. · You have tingling or numbness. · You feel sick to your stomach, or you vomit. · You have belly cramps. · You feel bloated or constipated. · You have to urinate a lot. · You feel very thirsty most of the time.   · You are dizzy or lightheaded, or you feel like you may faint. · You feel depressed, or you lose touch with reality. Watch closely for changes in your health, and be sure to contact your doctor if:  · You do not get better as expected. Where can you learn more? Go to http://william-sebastian.info/. Enter G358 in the search box to learn more about \"Hypokalemia: Care Instructions. \"  Current as of: July 28, 2016  Content Version: 11.3  © 8815-2326 Note. Care instructions adapted under license by [employer] (which disclaims liability or warranty for this information). If you have questions about a medical condition or this instruction, always ask your healthcare professional. [address] any warranty or liability for your use of this information. Preventing Falls: Care Instructions  Your Care Instructions  Getting around your home safely can be a challenge if you have injuries or health problems that make it easy for you to fall. Loose rugs and furniture in walkways are among the dangers for many older people who have problems walking or who have poor eyesight. People who have conditions such as arthritis, osteoporosis, or dementia also have to be careful not to fall. You can make your home safer with a few simple measures. Follow-up care is a key part of your treatment and safety. Be sure to make and go to all appointments, and call your doctor if you are having problems. It's also a good idea to know your test results and keep a list of the medicines you take. How can you care for yourself at home? Taking care of yourself  · You may get dizzy if you do not drink enough water. To prevent dehydration, drink plenty of fluids, enough so that your urine is light yellow or clear like water. Choose water and other caffeine-free clear liquids. If you have kidney, heart, or liver disease and have to limit fluids, talk with your doctor before you increase the amount of fluids you drink.   · Exercise regularly to improve your strength, muscle tone, and balance. Walk if you can. Swimming may be a good choice if you cannot walk easily. · Have your vision and hearing checked each year or any time you notice a change. If you have trouble seeing and hearing, you might not be able to avoid objects and could lose your balance. · Know the side effects of the medicines you take. Ask your doctor or pharmacist whether the medicines you take can affect your balance. Sleeping pills or sedatives can affect your balance. · Limit the amount of alcohol you drink. Alcohol can impair your balance and other senses. · Ask your doctor whether calluses or corns on your feet need to be removed. If you wear loose-fitting shoes because of calluses or corns, you can lose your balance and fall. · Talk to your doctor if you have numbness in your feet. Preventing falls at home  · Remove raised doorway thresholds, throw rugs, and clutter. Repair loose carpet or raised areas in the floor. · Move furniture and electrical cords to keep them out of walking paths. · Use nonskid floor wax, and wipe up spills right away, especially on ceramic tile floors. · If you use a walker or cane, put rubber tips on it. If you use crutches, clean the bottoms of them regularly with an abrasive pad, such as steel wool. · Keep your house well lit, especially [employer], and outside walkways. Use night-lights in areas such as hallways and bathrooms. Add extra light switches or use remote switches (such as switches that go on or off when you clap your hands) to make it easier to turn lights on if you have to get up during the night. · Install sturdy handrails on stairways. · Move items in your cabinets so that the things you use a lot are on the lower shelves (about waist level). · Keep a cordless phone and a flashlight with new batteries by your bed.  If possible, put a phone in each of the main rooms of your house, or carry a cell phone in case you fall and cannot reach a phone. Or, you can wear a device around your neck or wrist. You push a button that sends a signal for help. · Wear low-heeled shoes that fit well and give your feet good support. Use footwear with nonskid soles. Check the heels and soles of your shoes for wear. Repair or replace worn heels or soles. · Do not wear socks without shoes on wood floors. · Walk on the grass when the sidewalks are slippery. If you live in an area that gets snow and ice in the winter, sprinkle salt on slippery steps and sidewalks. Preventing falls in the bath  · Install grab bars and nonskid mats inside and outside your shower or tub and near the toilet and sinks. · Use shower chairs and bath benches. · Use a hand-held shower head that will allow you to sit while showering. · Get into a tub or shower by putting the weaker leg in first. Get out of a tub or shower with your strong side first.  · Repair loose toilet seats and consider installing a raised toilet seat to make getting on and off the toilet easier. · Keep your bathroom door unlocked while you are in the shower. Where can you learn more? Go to http://william-sebastian.info/. Enter [id number] [id number] in the search box to learn more about \"Preventing Falls: Care Instructions. \"  Current as of: August 4, 2016  Content Version: 11.3  © 9275-5852 [employer]. Care instructions adapted under license by [employer] (which disclaims liability or warranty for this information). If you have questions about a medical condition or this instruction, always ask your healthcare professional. [place] [address] any warranty or liability for your use of this information. Patient armband removed and shredded    MyChart Activation    Thank you for requesting access to [facility] [employer]. Please follow the instructions below to securely access and download your online medical record.  [facility] [employer] allows you to send messages to your doctor, view your test results, renew your prescriptions, schedule appointments, and more. How Do I Sign Up? 1. In your internet browser, go to [email or web address]  2. Click on the First Time User? Click Here link in the Sign In box. You will be redirect to the New Member Sign Up page. 3. Enter your [employer] Access Code exactly as it appears below. You will not need to use this code after youve completed the sign-up process. If you do not sign up before the expiration date, you must request a new code. [employer] Access Code: [email or web address]  Expires: 2017  5:22 PM (This is the date your [employer] access code will )    4. Enter the last four digits of your Social Security Number (xxxx) and Date of Birth (mm/dd/yyyy) as indicated and click Submit. You will be taken to the next sign-up page. 5. Create a [employer] ID. This will be your [employer] login ID and cannot be changed, so think of one that is secure and easy to remember. 6. Create a [employer] password. You can change your password at any time. 7. Enter your Password Reset Question and Answer. This can be used at a later time if you forget your password. 8. Enter your e-mail address. You will receive e-mail notification when new information is available in [address] 9. Click Sign Up. You can now view and download portions of your medical record. 10. Click the Download Summary menu link to download a portable copy of your medical information. Additional Information    If you have questions, please visit the Frequently Asked Questions section of the [employer] website at [email or web address] Remember, [employer] is NOT to be used for urgent needs. For medical emergencies, dial 911.       DISCHARGE SUMMARY from Nurse    The following personal items are in your possession at time of discharge:    Dental Appliances: None (no teeth)  Visual Aid: None     Home Medications: Kept at bedside  Jewelry: Watch  Clothing: None  Other Valuables: None             PATIENT INSTRUCTIONS:    After general anesthesia or intravenous sedation, for 24 hours or while taking prescription Narcotics:  · Limit your activities  · Do not drive and operate hazardous machinery  · Do not make important personal or business decisions  · Do  not drink alcoholic beverages  · If you have not urinated within 8 hours after discharge, please contact your surgeon on call. Report the following to your surgeon:  · Excessive pain, swelling, redness or odor of or around the surgical area  · Temperature over 100.5  · Nausea and vomiting lasting longer than 4 hours or if unable to take medications  · Any signs of decreased circulation or nerve impairment to extremity: change in color, persistent  numbness, tingling, coldness or increase pain  · Any questions        What to do at Home:  Recommended activity: Activity as tolerated    If you experience any of the following symptoms Nausea, vomiting, diarrhea, fever greater than 100.5, dizziness, severe headache, shortness of breath, chest pain, increased pain, please follow up with PCP. *  Please give a list of your current medications to your Primary Care Provider. *  Please update this list whenever your medications are discontinued, doses are      changed, or new medications (including over-the-counter products) are added. *  Please carry medication information at all times in case of emergency situations. These are general instructions for a healthy lifestyle:    No smoking/ No tobacco products/ Avoid exposure to second hand smoke    Surgeon General's Warning:  Quitting smoking now greatly reduces serious risk to your health.     Obesity, smoking, and sedentary lifestyle greatly increases your risk for illness    A healthy diet, regular physical exercise & weight monitoring are important for maintaining a healthy lifestyle    You may be retaining fluid if you have a history of heart failure or if you experience any of the following symptoms:  Weight gain of 3 pounds or more overnight or 5 pounds in a week, increased swelling in our hands or feet or shortness of breath while lying flat in bed. Please call your doctor as soon as you notice any of these symptoms; do not wait until your next office visit. Recognize signs and symptoms of STROKE:    F-face looks uneven    A-arms unable to move or move unevenly    S-speech slurred or non-existent    T-time-call 911 as soon as signs and symptoms begin-DO NOT go       Back to bed or wait to see if you get better-TIME IS BRAIN. Warning Signs of HEART ATTACK     Call 911 if you have these symptoms:   Chest discomfort. Most heart attacks involve discomfort in the center of the chest that lasts more than a few minutes, or that goes away and comes back. It can feel like uncomfortable pressure, squeezing, fullness, or pain.  Discomfort in other areas of the upper body. Symptoms can include pain or discomfort in one or both arms, the back, neck, jaw, or stomach.  Shortness of breath with or without chest discomfort.  Other signs may include breaking out in a cold sweat, nausea, or lightheadedness. Don't wait more than five minutes to call 911 - MINUTES MATTER! Fast action can save your life. Calling 911 is almost always the fastest way to get lifesaving treatment. Emergency Medical Services staff can begin treatment when they arrive -- up to an hour sooner than if someone gets to the hospital by car. The discharge information has been reviewed with the patient. The patient verbalized understanding. Discharge medications reviewed with the patient and appropriate educational materials and side effects teaching were provided.

## 2017-09-27 NOTE — ROUTINE PROCESS
Assumed patient care. Patient in bed, awake, alert and oriented to self and place. Denies pain or discomforts at this time. Call light placed within reach. Bed in low position and bed alarm activated for safety.

## 2017-09-27 NOTE — PROGRESS NOTES
Patient discharged home in the company of her daughter. Discharge instructions given. Questions answered. Daughter verbalized understanding. IV to right AC discontinued. Pt tolerated procedure well. Patient appears stable. Voiced no c/o pain.

## 2017-09-27 NOTE — PROGRESS NOTES
Care Management Interventions  PCP Verified by CM: No (changing from Dr Cesar Gómez to [facility] on [address] is attempting to make her fu up appointment )  Mode of Transport at Discharge:  (daughter )  Transition of Care Consult (CM Consult): [address]: Yes  Discharge Durable Medical Equipment: No  Physical Therapy Consult: Yes  Occupational Therapy Consult: Yes  Speech Therapy Consult: Yes  Current Support Network: Lives with Spouse (daughter is available to assist / also has PCA )  Confirm Follow Up Transport: Family  Plan discussed with Pt/Family/Caregiver: Yes (DW pt & her daughter with pt's permission ( Elizabeth Johnson))  Freedom of Choice Offered:  (Gave FOC to daughter who states she is POA & she chose [facility] / called referral to Balaji Uribe LPN at [facility] )  Discharge Location  Discharge Placement: Home with home health (verified as correct on facesheet with daughter )   Hermes Vela - daughter . POA - [phone]  Not currently active to [place] [phone]     Sent referral to [facility] in cc.    JAMES Flood & daughter . Pt is for dc today .

## 2017-09-27 NOTE — PROGRESS NOTES
OT orders received and chart reviewed. Patient is discharged prior to OT evaluation. Will defer OT to the next level of care.  Thank you for the referral.    Cresencio Shaffer OTR/L

## 2017-09-27 NOTE — PROGRESS NOTES
Problem: Mobility Impaired (Adult and Pediatric)  Goal: *Acute Goals and Plan of Care (Insert Text)  STGs to be addressed within 3 days:  1. Bed mobility: Supine to sit to supine S with HR for meals. 2. Activity tolerance: Tolerate up in chair 1-2 hrs for ADLs. 3. Transfers: Sit to stand to chair S with LRAD for ADLs. LTGs to be addressed within 7 days:  1. Standing/Ambulation Balance: Increase to Good with LRAD for safe transfers and gait. 2. Ambulation: Ambulate > 200 ft. S with LRAD for home mobility. 3. Patient Education: Independent with HEP for home safety. Patient discharged prior to PT arrival for therapy.

## 2017-09-27 NOTE — ROUTINE PROCESS
Bedside shift change report given to JARED Berry (oncoming nurse) by Shala Friedman RN (offgoing nurse). Report given with SBAR, Kardex, Intake/Output, MAR and Recent Results.

## 2017-09-27 NOTE — HOME CARE
Received HH referral; Discharge noted for today, [facility] will follow for SN for disease education/management,PT/OT,HHA ,home safety eval ; pt's daughter states pt has Rollator and shower chair, pt's daughter will pick pt up for discharge today, daughter states pt has personal care with [address] for 8hrs x 7days/wk; [facility] will follow. DURAN SOTO.

## 2017-09-29 ENCOUNTER — HOME CARE VISIT (OUTPATIENT)
Dept: SCHEDULING | Facility: HOME HEALTH | Age: 67
End: 2017-09-29
Payer: MEDICARE

## 2017-09-29 PROCEDURE — 3331090002 HH PPS REVENUE DEBIT

## 2017-09-29 PROCEDURE — G0299 HHS/HOSPICE OF RN EA 15 MIN: HCPCS

## 2017-09-29 PROCEDURE — 400013 HH SOC

## 2017-09-29 PROCEDURE — 3331090001 HH PPS REVENUE CREDIT

## 2017-09-30 LAB
BACTERIA SPEC CULT: NORMAL
BACTERIA SPEC CULT: NORMAL
SERVICE CMNT-IMP: NORMAL
SERVICE CMNT-IMP: NORMAL

## 2017-09-30 PROCEDURE — 3331090002 HH PPS REVENUE DEBIT

## 2017-09-30 PROCEDURE — 3331090001 HH PPS REVENUE CREDIT

## 2017-10-01 PROCEDURE — 3331090001 HH PPS REVENUE CREDIT

## 2017-10-01 PROCEDURE — 3331090002 HH PPS REVENUE DEBIT

## 2017-10-02 PROCEDURE — 3331090001 HH PPS REVENUE CREDIT

## 2017-10-02 PROCEDURE — 3331090002 HH PPS REVENUE DEBIT

## 2017-10-03 ENCOUNTER — HOME CARE VISIT (OUTPATIENT)
Dept: SCHEDULING | Facility: HOME HEALTH | Age: 67
End: 2017-10-03
Payer: MEDICARE

## 2017-10-03 ENCOUNTER — HOME CARE VISIT (OUTPATIENT)
Dept: HOME HEALTH SERVICES | Facility: HOME HEALTH | Age: 67
End: 2017-10-03
Payer: MEDICARE

## 2017-10-03 PROCEDURE — 3331090001 HH PPS REVENUE CREDIT

## 2017-10-03 PROCEDURE — 3331090002 HH PPS REVENUE DEBIT

## 2017-10-03 PROCEDURE — G0151 HHCP-SERV OF PT,EA 15 MIN: HCPCS

## 2017-10-04 ENCOUNTER — OFFICE VISIT (OUTPATIENT)
Dept: INTERNAL MEDICINE CLINIC | Age: 67
End: 2017-10-04

## 2017-10-04 VITALS
WEIGHT: 114 LBS | HEIGHT: 60 IN | RESPIRATION RATE: 16 BRPM | DIASTOLIC BLOOD PRESSURE: 78 MMHG | TEMPERATURE: 97.4 F | HEART RATE: 69 BPM | SYSTOLIC BLOOD PRESSURE: 130 MMHG | OXYGEN SATURATION: 99 % | BODY MASS INDEX: 22.38 KG/M2

## 2017-10-04 VITALS — DIASTOLIC BLOOD PRESSURE: 60 MMHG | SYSTOLIC BLOOD PRESSURE: 120 MMHG | HEART RATE: 77 BPM | OXYGEN SATURATION: 93 %

## 2017-10-04 DIAGNOSIS — R55 VASOVAGAL SYNCOPE: Primary | ICD-10-CM

## 2017-10-04 DIAGNOSIS — G20 PARKINSON'S DISEASE (HCC): ICD-10-CM

## 2017-10-04 DIAGNOSIS — F32.A DEPRESSION, UNSPECIFIED DEPRESSION TYPE: ICD-10-CM

## 2017-10-04 DIAGNOSIS — I10 ESSENTIAL HYPERTENSION, BENIGN: ICD-10-CM

## 2017-10-04 PROCEDURE — 3331090002 HH PPS REVENUE DEBIT

## 2017-10-04 PROCEDURE — 3331090001 HH PPS REVENUE CREDIT

## 2017-10-04 NOTE — MR AVS SNAPSHOT
Visit Information Date & Time Provider Department Dept. Phone Encounter #  
 10/4/2017  3:00 PM Andreia Dean MD [facility] INTERNAL MEDICINE OF Marlon Guerrero [id number] Your Appointments 10/19/2017 11:15 AM  
New Patient with Charlene Burciaga MD  
[address] [facility]) Appt Note: Dr. Estrada Bautista; parkinsons; notes in cc; new pt pkt mld. ...ywp  
 [address] [facility] [address] [facility] [address]  
[phone]  
  
   
 [place] [address]  
  
    
 1/3/2018  3:00 PM  
Follow Up with Andreia Dean MD  
[address] [facility]) [address] [facility], [address] [facility] [address]  
  
   
 [address] [facility], [address] [facility] [address] Upcoming Health Maintenance Date Due Hepatitis C Screening 1950 DTaP/Tdap/Td series (1 - Tdap) 1971 FOBT Q 1 YEAR AGE 50-75 2000 ZOSTER VACCINE AGE 60> 2010 BREAST CANCER SCRN MAMMOGRAM 2014 GLAUCOMA SCREENING Q2Y 2015 OSTEOPOROSIS SCREENING (DEXA) 2015 MEDICARE YEARLY EXAM 2015 Pneumococcal 65+ Low/Medium Risk (2 of 2 - PPSV23) 10/4/2018 Allergies as of 10/4/2017  Review Complete On: 10/4/2017 By: Andreia Dean MD  
  
 Severity Noted Reaction Type Reactions Ativan [Lorazepam]  2017    Other (comments) Insomnia, visual hallucinations Current Immunizations  Never Reviewed No immunizations on file. Not reviewed this visit You Were Diagnosed With   
  
 Codes Comments Vasovagal syncope    -  Primary ICD-10-CM: R55 
ICD-9-CM: 780.2 Parkinson's disease ([facility] [address]     ICD-10-CM: G20 
ICD-9-CM: 332.0 Essential hypertension, benign     ICD-10-CM: I10 
ICD-9-CM: 401.1 Depression, unspecified depression type     ICD-10-CM: F32.9 ICD-9-CM: 155 Vitals BP Pulse Temp Resp Height(growth percentile) 130/78 (BP 1 Location: Left arm, BP Patient Position: Sitting) 69 97.4 °F (36.3 °C) (Tympanic) 16 5' (1.524 m) Weight(growth percentile) SpO2 BMI OB Status Smoking Status 114 lb (51.7 kg) 99% 22.26 kg/m2 Postmenopausal Never Smoker BMI and BSA Data Body Mass Index Body Surface Area  
 22.26 kg/m 2 1.48 m 2 Your Updated Medication List  
  
   
This list is accurate as of: 10/4/17  3:28 PM.  Always use your most recent med list.  
  
  
  
  
 aspirin 81 mg chewable tablet Take 81 mg by mouth daily. citalopram 20 mg tablet Commonly known as:  Debarah Dieter Take 20 mg by mouth every morning. donepezil 5 mg tablet Commonly known as:  ARICEPT Take 5 mg by mouth nightly. ferrous sulfate 325 mg (65 mg iron) EC tablet Commonly known as:  IRON Take 1 tablet by mouth three (3) times daily (with meals). lisinopril 5 mg tablet Commonly known as:  Donnald Code Take  by mouth daily. memantine 10 mg tablet Commonly known as:  Sajan Canavan Take 10 mg by mouth two (2) times a day. risperiDONE 2 mg tablet Commonly known as:  RisperDAL Take 2 mg by mouth two (2) times a day. simvastatin 10 mg tablet Commonly known as:  ZOCOR Take  by mouth nightly. traZODone 50 mg tablet Commonly known as:  Lemmie Prudence Take 1 Tab by mouth nightly as needed for Sleep. To-Do List   
 10/05/2017 To Be Determined Appointment with Sue Oleary RN at [address] [place] [address] & HEALTH SERVICES! Tania Abel introduces [employer] patient portal. Now you can access parts of your medical record, email your doctor's office, and request medication refills online. 1. In your internet browser, go to [email or web address] 2. Click on the First Time User? Click Here link in the Sign In box. You will see the New Member Sign Up page. 3. Enter your CareParent Access Code exactly as it appears below. You will not need to use this code after youve completed the sign-up process. If you do not sign up before the expiration date, you must request a new code. · CareParent Access Code: [email or web address] Expires: 12/24/2017  5:22 PM 
 
4. Enter the last four digits of your Social Security Number (xxxx) and Date of Birth (mm/dd/yyyy) as indicated and click Submit. You will be taken to the next sign-up page. 5. Create a [employer] ID. This will be your CareParent login ID and cannot be changed, so think of one that is secure and easy to remember. 6. Create a CareParent password. You can change your password at any time. 7. Enter your Password Reset Question and Answer. This can be used at a later time if you forget your password. 8. Enter your e-mail address. You will receive e-mail notification when new information is available in [address] 9. Click Sign Up. You can now view and download portions of your medical record. 10. Click the Download Summary menu link to download a portable copy of your medical information. If you have questions, please visit the Frequently Asked Questions section of the CareParent website. Remember, CareParent is NOT to be used for urgent needs. For medical emergencies, dial 911. Now available from your iPhone and Android! Please provide this summary of care documentation to your next provider. Your primary care clinician is listed as Danielle Hernandez. If you have any questions after today's visit, please call [phone].

## 2017-10-04 NOTE — PROGRESS NOTES
HPI:   NP evaluaton  Hospitalized 9/24 - 9/27/17 s/p vasovagal syncope with bradycardia  Extensive w/u including echo, head CT/MRI, carotid PVLs negative  Hx notable for Parkinson disease, depression with hx of psychosis, HTN, hyperlipidemia, anemia  Currently feels well w/o angina, CHF, abd pain or evidence of GI/ hemorrhage      ROS is otherwise negative. Past Medical History:   Diagnosis Date    CVA (cerebral vascular accident) ([facility] [address]     distant past    Depression     with atypical psychosis    Hyperlipidemia     Hypertension     Parkinson disease ([facility] [address]     with dementia       Past Surgical History:   Procedure Laterality Date    HX HYSTERECTOMY         Social History     Social History    Marital status:      Spouse name: N/A    Number of children: N/A    Years of education: N/A     Occupational History    Not on file. Social History Main Topics    Smoking status: Never Smoker    Smokeless tobacco: Never Used    Alcohol use No    Drug use: No    Sexual activity: Not on file     Other Topics Concern    Not on file     Social History Narrative       Allergies   Allergen Reactions    Ativan [Lorazepam] Other (comments)     Insomnia, visual hallucinations       Family History   Problem Relation Age of Onset    Hypertension Mother     Hypertension Father        Current Outpatient Prescriptions   Medication Sig Dispense Refill    donepezil (ARICEPT) 5 mg tablet Take 5 mg by mouth nightly.  traZODone (DESYREL) 50 mg tablet Take 1 Tab by mouth nightly as needed for Sleep. 25 Tab 0    aspirin 81 mg chewable tablet Take 81 mg by mouth daily.  simvastatin (ZOCOR) 10 mg tablet Take  by mouth nightly.  memantine (NAMENDA) 10 mg tablet Take 10 mg by mouth two (2) times a day.  lisinopril (PRINIVIL, ZESTRIL) 5 mg tablet Take  by mouth daily.  risperidone (RISPERDAL) 2 mg tablet Take 2 mg by mouth two (2) times a day.       citalopram (CELEXA) 20 mg tablet Take 20 mg by mouth every morning.  ferrous sulfate (IRON) 325 mg (65 mg iron) EC tablet Take 1 tablet by mouth three (3) times daily (with meals). (Patient taking differently: Take 325 mg by mouth daily.) 30 tablet 0           Visit Vitals    /78 (BP 1 Location: Left arm, BP Patient Position: Sitting)    Pulse 69    Temp 97.4 °F (36.3 °C) (Tympanic)    Resp 16    Ht 5' (1.524 m)    Wt 114 lb (51.7 kg)    SpO2 99%    BMI 22.26 kg/m2       PE  Well nourished in NAD  HEENT:  OP: clear. Neck: supple w/o mass or bruits. Chest: clear. CV: RRR w/o m,r,g; pulses intact. Abd: soft, NT, w/o HSM or mass. Ext: w/o edema. Neuro: +tremor otherwise NF. Assessment and Plan    Encounter Diagnoses   Name Primary?     Vasovagal syncope Yes    Parkinson's disease ([facility] [address]     Essential hypertension, benign     Depression, unspecified depression type        Recent vasovagal syncope w/o recurrence; increase fluids; slow/deliberate postural change advised  HTN - controlled  Hyperlipidemia - continue dietary/activity efforts  Parkinson disease - has never received rx; neurology consult arranged for later this month  Depression with psychotic overlay - appears stable  Continue current rx  Declines any vaccinations  OV 3 mos or prn  I have explained plan to patient/daughter and the they verbalize understanding

## 2017-10-05 ENCOUNTER — HOME CARE VISIT (OUTPATIENT)
Dept: SCHEDULING | Facility: HOME HEALTH | Age: 67
End: 2017-10-05
Payer: MEDICARE

## 2017-10-05 PROCEDURE — 3331090001 HH PPS REVENUE CREDIT

## 2017-10-05 PROCEDURE — 3331090002 HH PPS REVENUE DEBIT

## 2017-10-05 PROCEDURE — G0299 HHS/HOSPICE OF RN EA 15 MIN: HCPCS

## 2017-10-06 ENCOUNTER — HOME CARE VISIT (OUTPATIENT)
Dept: SCHEDULING | Facility: HOME HEALTH | Age: 67
End: 2017-10-06
Payer: MEDICARE

## 2017-10-06 PROCEDURE — 3331090001 HH PPS REVENUE CREDIT

## 2017-10-06 PROCEDURE — 3331090002 HH PPS REVENUE DEBIT

## 2017-10-06 PROCEDURE — G0157 HHC PT ASSISTANT EA 15: HCPCS

## 2017-10-07 VITALS
RESPIRATION RATE: 20 BRPM | OXYGEN SATURATION: 98 % | TEMPERATURE: 97.3 F | DIASTOLIC BLOOD PRESSURE: 70 MMHG | SYSTOLIC BLOOD PRESSURE: 126 MMHG | HEART RATE: 62 BPM

## 2017-10-07 PROCEDURE — 3331090002 HH PPS REVENUE DEBIT

## 2017-10-07 PROCEDURE — 3331090001 HH PPS REVENUE CREDIT

## 2017-10-08 PROCEDURE — 3331090001 HH PPS REVENUE CREDIT

## 2017-10-08 PROCEDURE — 3331090002 HH PPS REVENUE DEBIT

## 2017-10-09 PROCEDURE — 3331090002 HH PPS REVENUE DEBIT

## 2017-10-09 PROCEDURE — 3331090001 HH PPS REVENUE CREDIT

## 2017-10-10 PROCEDURE — 3331090001 HH PPS REVENUE CREDIT

## 2017-10-10 PROCEDURE — 3331090002 HH PPS REVENUE DEBIT

## 2017-10-11 ENCOUNTER — HOME CARE VISIT (OUTPATIENT)
Dept: HOME HEALTH SERVICES | Facility: HOME HEALTH | Age: 67
End: 2017-10-11
Payer: MEDICARE

## 2017-10-11 PROCEDURE — 3331090002 HH PPS REVENUE DEBIT

## 2017-10-11 PROCEDURE — 3331090001 HH PPS REVENUE CREDIT

## 2017-10-11 PROCEDURE — G0157 HHC PT ASSISTANT EA 15: HCPCS

## 2017-10-12 PROCEDURE — 3331090002 HH PPS REVENUE DEBIT

## 2017-10-12 PROCEDURE — 3331090001 HH PPS REVENUE CREDIT

## 2017-10-13 ENCOUNTER — HOME CARE VISIT (OUTPATIENT)
Dept: HOME HEALTH SERVICES | Facility: HOME HEALTH | Age: 67
End: 2017-10-13
Payer: MEDICARE

## 2017-10-13 VITALS — SYSTOLIC BLOOD PRESSURE: 162 MMHG | DIASTOLIC BLOOD PRESSURE: 77 MMHG | OXYGEN SATURATION: 97 % | HEART RATE: 49 BPM

## 2017-10-13 PROCEDURE — G0157 HHC PT ASSISTANT EA 15: HCPCS

## 2017-10-13 PROCEDURE — 3331090001 HH PPS REVENUE CREDIT

## 2017-10-13 PROCEDURE — 3331090002 HH PPS REVENUE DEBIT

## 2017-10-14 PROCEDURE — 3331090002 HH PPS REVENUE DEBIT

## 2017-10-14 PROCEDURE — 3331090001 HH PPS REVENUE CREDIT

## 2017-10-15 PROCEDURE — 3331090002 HH PPS REVENUE DEBIT

## 2017-10-15 PROCEDURE — 3331090001 HH PPS REVENUE CREDIT

## 2017-10-16 PROCEDURE — 3331090001 HH PPS REVENUE CREDIT

## 2017-10-16 PROCEDURE — 3331090002 HH PPS REVENUE DEBIT

## 2017-10-16 RX ORDER — SIMVASTATIN 10 MG/1
10 TABLET, FILM COATED ORAL
Qty: 90 TAB | Refills: 1 | Status: SHIPPED | OUTPATIENT
Start: 2017-10-16 | End: 2019-04-10 | Stop reason: SDUPTHER

## 2017-10-16 RX ORDER — TRAZODONE HYDROCHLORIDE 50 MG/1
50 TABLET ORAL
Qty: 30 TAB | Refills: 3 | Status: SHIPPED | OUTPATIENT
Start: 2017-10-16 | End: 2019-10-23

## 2017-10-16 NOTE — TELEPHONE ENCOUNTER
Requested Prescriptions     Pending Prescriptions Disp Refills    simvastatin (ZOCOR) 10 mg tablet       Sig: Take  by mouth nightly.  traZODone (DESYREL) 50 mg tablet 25 Tab 0     Sig: Take 1 Tab by mouth nightly as needed for Sleep.

## 2017-10-17 PROCEDURE — 3331090002 HH PPS REVENUE DEBIT

## 2017-10-17 PROCEDURE — 3331090001 HH PPS REVENUE CREDIT

## 2017-10-18 ENCOUNTER — HOME CARE VISIT (OUTPATIENT)
Dept: HOME HEALTH SERVICES | Facility: HOME HEALTH | Age: 67
End: 2017-10-18
Payer: MEDICARE

## 2017-10-18 VITALS
DIASTOLIC BLOOD PRESSURE: 81 MMHG | HEART RATE: 51 BPM | OXYGEN SATURATION: 98 % | SYSTOLIC BLOOD PRESSURE: 155 MMHG | OXYGEN SATURATION: 97 % | HEART RATE: 60 BPM | SYSTOLIC BLOOD PRESSURE: 133 MMHG | DIASTOLIC BLOOD PRESSURE: 80 MMHG

## 2017-10-18 VITALS — SYSTOLIC BLOOD PRESSURE: 151 MMHG | OXYGEN SATURATION: 97 % | HEART RATE: 59 BPM | DIASTOLIC BLOOD PRESSURE: 80 MMHG

## 2017-10-18 PROCEDURE — G0157 HHC PT ASSISTANT EA 15: HCPCS

## 2017-10-18 PROCEDURE — 3331090002 HH PPS REVENUE DEBIT

## 2017-10-18 PROCEDURE — 3331090001 HH PPS REVENUE CREDIT

## 2017-10-19 ENCOUNTER — OFFICE VISIT (OUTPATIENT)
Dept: NEUROLOGY | Age: 67
End: 2017-10-19

## 2017-10-19 ENCOUNTER — HOME CARE VISIT (OUTPATIENT)
Dept: SCHEDULING | Facility: HOME HEALTH | Age: 67
End: 2017-10-19
Payer: MEDICARE

## 2017-10-19 VITALS
OXYGEN SATURATION: 98 % | RESPIRATION RATE: 12 BRPM | HEIGHT: 60 IN | TEMPERATURE: 98.2 F | DIASTOLIC BLOOD PRESSURE: 68 MMHG | HEART RATE: 58 BPM | BODY MASS INDEX: 22.54 KG/M2 | WEIGHT: 114.8 LBS | SYSTOLIC BLOOD PRESSURE: 142 MMHG

## 2017-10-19 DIAGNOSIS — I10 ESSENTIAL HYPERTENSION, BENIGN: ICD-10-CM

## 2017-10-19 DIAGNOSIS — G21.9 SECONDARY PARKINSONISM, UNSPECIFIED SECONDARY PARKINSONISM TYPE (HCC): Primary | ICD-10-CM

## 2017-10-19 DIAGNOSIS — F01.50 VASCULAR DEMENTIA WITHOUT BEHAVIORAL DISTURBANCE (HCC): ICD-10-CM

## 2017-10-19 DIAGNOSIS — F29 PSYCHOSIS, UNSPECIFIED PSYCHOSIS TYPE (HCC): ICD-10-CM

## 2017-10-19 PROCEDURE — 3331090003 HH PPS REVENUE ADJ

## 2017-10-19 PROCEDURE — 3331090001 HH PPS REVENUE CREDIT

## 2017-10-19 PROCEDURE — G0151 HHCP-SERV OF PT,EA 15 MIN: HCPCS

## 2017-10-19 PROCEDURE — 3331090002 HH PPS REVENUE DEBIT

## 2017-10-19 NOTE — PROGRESS NOTES
[facility]             [address] [place] [facility] [address]    10/19/2017    HPI:  Salbador Wilburn is a 79 y.o., right handed,   female, who presents with tremor. Patient has long history of psychosis has been on medications for hallucinations over 30 years. She first noted to have memory difficulties over 5 years ago. She was hospitalized in 2015 at [facility] for stroke and has had left-sided weakness and tremor on the left hand since then. Patient is unable to provide history but is accompanied by her daughter. History is per daughter patient is on Risperdal through psychiatry. Daughter is unaware of any recent adjustments. She is on aspirin Namenda and donepezil for memory. She also has a history of cerebrovascular disease bradycardia depression she was recently evaluated for syncope several weeks ago    Current Outpatient Prescriptions   Medication Sig Dispense Refill    simvastatin (ZOCOR) 10 mg tablet Take 1 Tab by mouth nightly. 90 Tab 1    traZODone (DESYREL) 50 mg tablet Take 1 Tab by mouth nightly as needed for Sleep. 30 Tab 3    donepezil (ARICEPT) 5 mg tablet Take 5 mg by mouth nightly.  aspirin 81 mg chewable tablet Take 81 mg by mouth daily.  memantine (NAMENDA) 10 mg tablet Take 10 mg by mouth two (2) times a day.  lisinopril (PRINIVIL, ZESTRIL) 5 mg tablet Take  by mouth daily.  risperidone (RISPERDAL) 2 mg tablet Take 2 mg by mouth two (2) times a day.  citalopram (CELEXA) 20 mg tablet Take 20 mg by mouth every morning.  ferrous sulfate (IRON) 325 mg (65 mg iron) EC tablet Take 1 tablet by mouth three (3) times daily (with meals). (Patient taking differently: Take 325 mg by mouth daily.  decreased to 325 mg daily) 30 tablet 0       Past Medical History:   Diagnosis Date    CVA (cerebral vascular accident) ([facility] [address]     distant past    Depression     with atypical psychosis    Hyperlipidemia  Hypertension     Parkinson disease ([facility] [address]     with dementia       Past Surgical History:   Procedure Laterality Date    HX HYSTERECTOMY       Family History   Problem Relation Age of Onset    Hypertension Mother     Hypertension Father      Allergies   Allergen Reactions    Ativan [Lorazepam] Other (comments)     Insomnia, visual hallucinations       Review of Systems:   Review of Systems - History obtained from child,chart review  General ROS: negative  Psychological ROS: positive for - hallucinations and memory difficulties  ENT ROS: negative  Hematological and Lymphatic ROS: negative  Endocrine ROS: negative  Respiratory ROS: no cough, shortness of breath, or wheezing  Cardiovascular ROS: no chest pain or dyspnea on exertion  Gastrointestinal ROS: no abdominal pain, change in bowel habits, or black or bloody stools  Genito-Urinary ROS: no dysuria, trouble voiding, or hematuria  Musculoskeletal ROS: negative  Neurological ROS: positive for - tremors  Dermatological ROS: negative    PHYSICAL EXAMINATION:    Visit Vitals    /68 (BP 1 Location: Left arm, BP Patient Position: Sitting)    Pulse (!) 58    Temp 98.2 °F (36.8 °C) (Oral)    Resp 12    Ht 5' (1.524 m)    Wt 52.1 kg (114 lb 12.8 oz)    SpO2 98%    BMI 22.42 kg/m2     General:  Well defined, nourished, and groomed individual in no acute distress. Neck: Supple, nontender, thyroid within normal limits, no JVD, no bruits, no pain with resistance to active range of motion. Heart: Regular rate and rhythm, no murmurs, rub, or gallop. Normal S1S2. Lungs:  Clear to auscultation bilaterally with equal chest expansion, no cough, no wheeze  Musculoskeletal:  Extremities revealed no edema and had full range of motion of joints. Psych:   normal affect masked facies     NEUROLOGICAL EXAMINATION:     Mental Status:   Alert and oriented to person, only not place, and time with poor  recent and remote memory .   Attention span and concentration are normal. Speech is sparse  with a poorfund of knowledge. Marked apraxia    Cranial Nerves:    II, III, IV, VI:  Visual acuity grossly intact. Visual fields are normal.    Pupils are equal, round, and reactive to light and accommodation. Extra-ocular movements are full and fluid. Fundoscopic exam was not visualizedno ptosis or nystagmus. V-XII: Hearing is grossly intact. Facial features are symmetric, with normal sensation and strength. The palate rises symmetrically and the tongue protrudes midline. Sternocleidomastoids 5/5. Motor Examination: Normal tone, bulk, and strength consistent with apraxia. No cogwheel rigidity or clonus present. Sensory exam: uncertain reliability    Coordination:  Heel-to-shinFinger to nose and rapid arm movement testing unable to assess unable to follow directions  resting  tremor left hand nointention tremor    Gait and Station:  Steady ,slowwhile walking,unable to do stressed gait or tandem walking. Normal arm swing. No Romberg or pronator drift. No muscle wasting or fasiculations noted. Reflexes:  DTRs depressed  throughout. Toes downgoing. Assessment and Plan:   Verito Diaz is a 79 y.o. right handed female whose history and physical are consistent with dementia complicated by secondary Parkinsonism. Verito Diaz who has risk factors including cerebrovascular disease,neuroleptic exposure  Diagnoses and all orders for this visit:    1. Secondary parkinsonism, unspecified secondary Parkinsonism type ([address]    2. Essential hypertension, benign    3. Vascular dementia without behavioral disturbance    4. Psychosis, unspecified psychosis type      Follow-up Disposition:  Return in about 4 months (around 2/19/2018).    Patient to have risperadal changed to another drug by Psychiatry then follow up here  Patient to take aricept with food in am  I spent  60minutes with the patient in face-to-face consultation, of which greater than 50% was spent in counseling and coordination of care as described above.

## 2017-10-19 NOTE — MR AVS SNAPSHOT
Visit Information Date & Time Provider Department Dept. Phone Encounter #  
 10/19/2017 11:15 AM Elena Beltran  [facility] [address] [address] [id number] Follow-up Instructions Return in about 4 months (around 2/19/2018). Your Appointments 1/3/2018  3:00 PM  
Follow Up with Caroline Guzman MD  
[facility] Appt Note: 3mo  
 [address], [address] [facility] [address]  
[phone]  
  
   
 [address], [address] [facility] [address]  
  
    
 2/19/2018 10:00 AM  
Follow Up with Elena Beltran MD  
[address]) Appt Note: 4mon f/u  
 [address] [facility] [id number] [phone]  
  
   
 [facility] [address] Upcoming Health Maintenance Date Due Hepatitis C Screening 1950 DTaP/Tdap/Td series (1 - Tdap) 6/9/1971 FOBT Q 1 YEAR AGE 50-75 6/9/2000 ZOSTER VACCINE AGE 60> 4/9/2010 BREAST CANCER SCRN MAMMOGRAM 6/6/2014 GLAUCOMA SCREENING Q2Y 6/9/2015 OSTEOPOROSIS SCREENING (DEXA) 6/9/2015 MEDICARE YEARLY EXAM 6/9/2015 Pneumococcal 65+ Low/Medium Risk (2 of 2 - PPSV23) 10/4/2018 Allergies as of 10/19/2017  Review Complete On: 10/19/2017 By: Elena Beltran MD  
  
 Severity Noted Reaction Type Reactions Ativan [Lorazepam]  09/27/2017    Other (comments) Insomnia, visual hallucinations Current Immunizations  Never Reviewed No immunizations on file. Not reviewed this visit You Were Diagnosed With   
  
 Codes Comments Secondary parkinsonism, unspecified secondary [facility]ism type ([facility]    -  Primary ICD-10-CM: G21.9 ICD-9-CM: 332.1 Essential hypertension, benign     ICD-10-CM: I10 
ICD-9-CM: 401.1 Vascular dementia without behavioral disturbance     ICD-10-CM: F01.50 ICD-9-CM: 290.40 Psychosis, unspecified psychosis type     ICD-10-CM: F29 
ICD-9-CM: 298.9 Vitals BP Pulse Temp Resp Height(growth percentile) Weight(growth percentile) 142/68 (BP 1 Location: Left arm, BP Patient Position: Sitting) (!) 58 98.2 °F (36.8 °C) (Oral) 12 5' (1.524 m) 114 lb 12.8 oz (52.1 kg) SpO2 BMI OB Status Smoking Status 98% 22.42 kg/m2 Postmenopausal Never Smoker BMI and BSA Data Body Mass Index Body Surface Area  
 22.42 kg/m 2 1.49 m 2 Preferred Pharmacy Pharmacy Name Phone [facility], [address] Your Updated Medication List  
  
   
This list is accurate as of: 10/19/17 12:09 PM.  Always use your most recent med list.  
  
  
  
  
 aspirin 81 mg chewable tablet Take 81 mg by mouth daily. citalopram 20 mg tablet Commonly known as:  Willem Peel Take 20 mg by mouth every morning. donepezil 5 mg tablet Commonly known as:  ARICEPT Take 5 mg by mouth nightly. ferrous sulfate 325 mg (65 mg iron) EC tablet Commonly known as:  IRON Take 1 tablet by mouth three (3) times daily (with meals). lisinopril 5 mg tablet Commonly known as:  Michaelyn Codorus Take  by mouth daily. memantine 10 mg tablet Commonly known as:  Sharon Jaleesa Take 10 mg by mouth two (2) times a day. risperiDONE 2 mg tablet Commonly known as:  RisperDAL Take 2 mg by mouth two (2) times a day. simvastatin 10 mg tablet Commonly known as:  ZOCOR Take 1 Tab by mouth nightly. traZODone 50 mg tablet Commonly known as:  Carie Alisson Take 1 Tab by mouth nightly as needed for Sleep. Follow-up Instructions Return in about 4 months (around 2/19/2018). Introducing [facility] & HEALTH SERVICES! [place] [employer] introduces [employer] patient portal. Now you can access parts of your medical record, email your doctor's office, and request medication refills online. 1. In your internet browser, go to [email or web address] 2. Click on the First Time User? Click Here link in the Sign In box. You will see the New Member Sign Up page. 3. Enter your [employer]asts Access Code exactly as it appears below. You will not need to use this code after youve completed the sign-up process. If you do not sign up before the expiration date, you must request a new code. · [employer] Access Code: [email or web address] Expires: 12/24/2017  5:22 PM 
 
4. Enter the last four digits of your Social Security Number (xxxx) and Date of Birth (mm/dd/yyyy) as indicated and click Submit. You will be taken to the next sign-up page. 5. Create a [employer] ID. This will be your [employer]asts login ID and cannot be changed, so think of one that is secure and easy to remember. 6. Create a [employer] password. You can change your password at any time. 7. Enter your Password Reset Question and Answer. This can be used at a later time if you forget your password. 8. Enter your e-mail address. You will receive e-mail notification when new information is available in [address] 9. Click Sign Up. You can now view and download portions of your medical record. 10. Click the Download Summary menu link to download a portable copy of your medical information. If you have questions, please visit the Frequently Asked Questions section of the [employer] website. Remember, [employer] is NOT to be used for urgent needs. For medical emergencies, dial 911. Now available from your iPhone and Android! Please provide this summary of care documentation to your next provider. Your primary care clinician is listed as Caterina Morin. If you have any questions after today's visit, please call [phone].

## 2017-10-20 VITALS
DIASTOLIC BLOOD PRESSURE: 90 MMHG | HEART RATE: 56 BPM | OXYGEN SATURATION: 99 % | SYSTOLIC BLOOD PRESSURE: 150 MMHG | TEMPERATURE: 97.7 F

## 2017-10-20 PROCEDURE — 3331090002 HH PPS REVENUE DEBIT

## 2017-10-20 PROCEDURE — 3331090001 HH PPS REVENUE CREDIT

## 2017-10-21 PROCEDURE — 3331090002 HH PPS REVENUE DEBIT

## 2017-10-21 PROCEDURE — 3331090001 HH PPS REVENUE CREDIT

## 2017-10-22 PROCEDURE — 3331090001 HH PPS REVENUE CREDIT

## 2017-10-22 PROCEDURE — 3331090002 HH PPS REVENUE DEBIT

## 2017-10-23 PROCEDURE — 3331090002 HH PPS REVENUE DEBIT

## 2017-10-23 PROCEDURE — 3331090001 HH PPS REVENUE CREDIT

## 2017-10-24 PROCEDURE — 3331090001 HH PPS REVENUE CREDIT

## 2017-10-24 PROCEDURE — 3331090002 HH PPS REVENUE DEBIT

## 2017-10-25 PROCEDURE — 3331090001 HH PPS REVENUE CREDIT

## 2017-10-25 PROCEDURE — 3331090002 HH PPS REVENUE DEBIT

## 2017-10-26 PROCEDURE — 3331090001 HH PPS REVENUE CREDIT

## 2017-10-26 PROCEDURE — 3331090002 HH PPS REVENUE DEBIT

## 2017-10-27 PROCEDURE — 3331090001 HH PPS REVENUE CREDIT

## 2017-10-27 PROCEDURE — 3331090002 HH PPS REVENUE DEBIT

## 2017-10-28 PROCEDURE — 3331090002 HH PPS REVENUE DEBIT

## 2017-10-28 PROCEDURE — 3331090001 HH PPS REVENUE CREDIT

## 2017-10-29 PROCEDURE — 3331090002 HH PPS REVENUE DEBIT

## 2017-10-29 PROCEDURE — 3331090001 HH PPS REVENUE CREDIT

## 2017-10-30 PROCEDURE — 3331090001 HH PPS REVENUE CREDIT

## 2017-10-30 PROCEDURE — 3331090002 HH PPS REVENUE DEBIT

## 2018-01-03 ENCOUNTER — OFFICE VISIT (OUTPATIENT)
Dept: INTERNAL MEDICINE CLINIC | Age: 68
End: 2018-01-03

## 2018-01-03 VITALS
HEART RATE: 56 BPM | DIASTOLIC BLOOD PRESSURE: 90 MMHG | RESPIRATION RATE: 16 BRPM | HEIGHT: 60 IN | TEMPERATURE: 97.6 F | OXYGEN SATURATION: 100 % | BODY MASS INDEX: 21.79 KG/M2 | WEIGHT: 111 LBS | SYSTOLIC BLOOD PRESSURE: 160 MMHG

## 2018-01-03 DIAGNOSIS — G20 PARKINSON'S DISEASE (HCC): ICD-10-CM

## 2018-01-03 DIAGNOSIS — I10 ESSENTIAL HYPERTENSION, BENIGN: ICD-10-CM

## 2018-01-03 DIAGNOSIS — E78.2 MIXED HYPERLIPIDEMIA: ICD-10-CM

## 2018-01-03 DIAGNOSIS — Z00.00 ROUTINE GENERAL MEDICAL EXAMINATION AT A HEALTH CARE FACILITY: Primary | ICD-10-CM

## 2018-01-03 RX ORDER — FERROUS SULFATE 325(65) MG
325 TABLET, DELAYED RELEASE (ENTERIC COATED) ORAL DAILY
COMMUNITY
End: 2019-10-23

## 2018-01-03 RX ORDER — LISINOPRIL 10 MG/1
10 TABLET ORAL DAILY
Qty: 90 TAB | Refills: 3 | Status: SHIPPED | OUTPATIENT
Start: 2018-01-03 | End: 2019-04-10 | Stop reason: SDUPTHER

## 2018-01-03 NOTE — MR AVS SNAPSHOT
Visit Information Date & Time Provider Department Dept. Phone Encounter #  
 1/3/2018  3:00 PM Ryder Mclean MD [facility] INTERNAL MEDICINE OF [place] [phone] [id number] Follow-up Instructions Return if symptoms worsen or fail to improve. Your Appointments 2/19/2018 10:00 AM  
Follow Up with Jaden Bravo MD  
[address] [facility]) Appt Note: 4mon f/u  
 [address] [place] [address] [facility] [id number] [phone]  
  
   
 [place] [id number] Upcoming Health Maintenance Date Due Hepatitis C Screening 1950 DTaP/Tdap/Td series (1 - Tdap) 6/9/1971 FOBT Q 1 YEAR AGE 50-75 6/9/2000 ZOSTER VACCINE AGE 60> 4/9/2010 BREAST CANCER SCRN MAMMOGRAM 6/6/2014 GLAUCOMA SCREENING Q2Y 6/9/2015 OSTEOPOROSIS SCREENING (DEXA) 6/9/2015 MEDICARE YEARLY EXAM 6/9/2015 Pneumococcal 65+ Low/Medium Risk (2 of 2 - PPSV23) 10/4/2018 Allergies as of 1/3/2018  Review Complete On: 1/3/2018 By: Ryder Mclean MD  
  
 Severity Noted Reaction Type Reactions Ativan [Lorazepam]  09/27/2017    Other (comments) Insomnia, visual hallucinations Current Immunizations  Never Reviewed No immunizations on file. Not reviewed this visit You Were Diagnosed With   
  
 Codes Comments Routine general medical examination at a health care facility    -  Primary ICD-10-CM: Z00.00 ICD-9-CM: V70.0 Essential hypertension, benign     ICD-10-CM: I10 
ICD-9-CM: 401.1 Mixed hyperlipidemia     ICD-10-CM: E78.2 ICD-9-CM: 272.2 Parkinson's disease ([facility] [address]     ICD-10-CM: G20 
ICD-9-CM: 332.0 Vitals BP Pulse Temp Resp Height(growth percentile) 160/90 (BP 1 Location: Left arm, BP Patient Position: Sitting) (!) 56 97.6 °F (36.4 °C) (Tympanic) 16 5' (1.524 m) Weight(growth percentile) SpO2 BMI OB Status Smoking Status 111 lb (50.3 kg) 100% 21.68 kg/m2 Postmenopausal Never Smoker Vitals History BMI and BSA Data Body Mass Index Body Surface Area  
 21.68 kg/m 2 1.46 m 2 Preferred Pharmacy Pharmacy Name Phone [address], [address] Your Updated Medication List  
  
   
This list is accurate as of: 1/3/18  3:21 PM.  Always use your most recent med list.  
  
  
  
  
 aspirin 81 mg chewable tablet Take 81 mg by mouth daily. citalopram 20 mg tablet Commonly known as:  [facility] [place] Take 20 mg by mouth every morning. ferrous sulfate 325 mg (65 mg iron) EC tablet Commonly known as:  IRON Take 325 mg by mouth daily. lisinopril 5 mg tablet Commonly known as:  [facility] [place] Take 5 mg by mouth daily. risperiDONE 2 mg tablet Commonly known as:  RisperDAL Take 2 mg by mouth two (2) times a day. simvastatin 10 mg tablet Commonly known as:  ZOCOR Take 1 Tab by mouth nightly. traZODone 50 mg tablet Commonly known as:  [facility]an [place] Take 1 Tab by mouth nightly as needed for Sleep. Follow-up Instructions Return if symptoms worsen or fail to improve. Patient Instructions DASH Diet: Care Instructions Your Care Instructions The DASH diet is an eating plan that can help lower your blood pressure. DASH stands for Dietary Approaches to Stop Hypertension. Hypertension is high blood pressure. The DASH diet focuses on eating foods that are high in calcium, potassium, and magnesium. These nutrients can lower blood pressure. The foods that are highest in these nutrients are fruits, vegetables, low-fat dairy products, nuts, seeds, and legumes. But taking calcium, potassium, and magnesium supplements instead of eating foods that are high in those nutrients does not have the same effect. The DASH diet also includes whole grains, fish, and poultry. The DASH diet is one of several lifestyle changes your doctor may recommend to lower your high blood pressure. Your doctor may also want you to decrease the amount of sodium in your diet. Lowering sodium while following the DASH diet can lower blood pressure even further than just the DASH diet alone. Follow-up care is a key part of your treatment and safety. Be sure to make and go to all appointments, and call your doctor if you are having problems. It's also a good idea to know your test results and keep a list of the medicines you take. How can you care for yourself at home? Following the DASH diet · Eat 4 to 5 servings of fruit each day. A serving is 1 medium-sized piece of fruit, ½ cup chopped or canned fruit, 1/4 cup dried fruit, or 4 ounces (½ cup) of fruit juice. Choose fruit more often than fruit juice. · Eat 4 to 5 servings of vegetables each day. A serving is 1 cup of lettuce or raw leafy vegetables, ½ cup of chopped or cooked vegetables, or 4 ounces (½ cup) of vegetable juice. Choose vegetables more often than vegetable juice. · Get 2 to 3 servings of low-fat and fat-free dairy each day. A serving is 8 ounces of milk, 1 cup of yogurt, or 1 ½ ounces of cheese. · Eat 6 to 8 servings of grains each day. A serving is 1 slice of bread, 1 ounce of dry cereal, or ½ cup of cooked rice, pasta, or cooked cereal. Try to choose whole-grain products as much as possible. · Limit lean meat, poultry, and fish to 2 servings each day. A serving is 3 ounces, about the size of a deck of cards. · Eat 4 to 5 servings of nuts, seeds, and legumes (cooked dried beans, lentils, and split peas) each week. A serving is 1/3 cup of nuts, 2 tablespoons of seeds, or ½ cup of cooked beans or peas. · Limit fats and oils to 2 to 3 servings each day. A serving is 1 teaspoon of vegetable oil or 2 tablespoons of salad dressing. · Limit sweets and added sugars to 5 servings or less a week.  A serving is 1 tablespoon jelly or jam, ½ cup sorbet, or 1 cup of lemonade. · Eat less than 2,300 milligrams (mg) of sodium a day. If you limit your sodium to 1,500 mg a day, you can lower your blood pressure even more. Tips for success · Start small. Do not try to make dramatic changes to your diet all at once. You might feel that you are missing out on your favorite foods and then be more likely to not follow the plan. Make small changes, and stick with them. Once those changes become habit, add a few more changes. · Try some of the following: ¨ Make it a goal to eat a fruit or vegetable at every meal and at snacks. This will make it easy to get the recommended amount of fruits and vegetables each day. ¨ Try yogurt topped with fruit and nuts for a snack or healthy dessert. ¨ Add lettuce, tomato, cucumber, and onion to sandwiches. ¨ Combine a ready-made pizza crust with low-fat mozzarella cheese and lots of vegetable toppings. Try using tomatoes, squash, spinach, broccoli, carrots, cauliflower, and onions. ¨ Have a variety of cut-up vegetables with a low-fat dip as an appetizer instead of chips and dip. ¨ Sprinkle sunflower seeds or chopped almonds over salads. Or try adding chopped walnuts or almonds to cooked vegetables. ¨ Try some vegetarian meals using beans and peas. Add garbanzo or kidney beans to salads. Make burritos and tacos with mashed laughlin beans or black beans. Where can you learn more? Go to http://william-sebastian.info/. Enter Z881 in the search box to learn more about \"DASH Diet: Care Instructions. \" Current as of: September 21, 2016 Content Version: 11.4 © 2547-8420 [employer]. Care instructions adapted under license by [employer] (which disclaims liability or warranty for this information).  If you have questions about a medical condition or this instruction, always ask your healthcare professional. [employer] disclaims any warranty or liability for your use of this information. Medicare Wellness Visit, Female The best way to live healthy is to have a healthy lifestyle by eating a well-balanced diet, exercising regularly, limiting alcohol and stopping smoking. Regular physical exams and screening tests are another way to keep healthy. Preventive exams provided by your health care provider can find health problems before they become diseases or illnesses. Preventive services including immunizations, screening tests, monitoring and exams can help you take care of your own health. All people over age 72 should have a pneumovax  and and a prevnar shot to prevent pneumonia. These are once in a lifetime unless you and your provider decide differently. All people over 65 should have a yearly flu shot and a tetanus vaccine every 10 years. A bone mass density to screen for osteoporosis or thinning of the bones should be done every 2 years after 65. Screening for diabetes mellitus with a blood sugar test should be done every year. Glaucoma is a disease of the eye due to increased ocular pressure that can lead to blindness and it should be done every year by an eye professional. 
 
Cardiovascular screening tests that check for elevated lipids (fatty part of blood) which can lead to heart disease and strokes should be done every 5 years. Colorectal screening that evaluates for blood or polyps in your colon should be done yearly as a stool test or every five years as a flexible sigmoidoscope or every 10 years as a colonoscopy up to age 76. Breast cancer screening with a mammogram is recommended biennially  for women age 54-69. Screening for cervical cancer with a pap smear and pelvic exam is recommended for women after age 72 years every 2 years up to age 79 or when the provider and patient decide to stop. If there is a history of cervical abnormalities or other increased risk for cancer then the test is recommended yearly. Hepatitis C screening is also recommended for anyone born between [birth year] through [address]. A shingles vaccine is also recommended once in a lifetime after age 61. Your Medicare Wellness Exam is recommended annually. Here is a list of your current Health Maintenance items with a due date: 
Health Maintenance Due Topic Date Due  
 Hepatitis C Test  1950  DTaP/Tdap/Td  (1 - Tdap) 06/09/1971  Stool testing for trace blood  06/09/2000  Shingles Vaccine  04/09/2010  Breast Cancer Screening  06/06/2014  Glaucoma Screening   06/09/2015  Bone Density Screening  06/09/2015 [facility] Annual Well Visit  06/09/2015 Introducing [facility]! Kiarra Gutierrez introduces flaregames patient portal. Now you can access parts of your medical record, email your doctor's office, and request medication refills online. 1. In your internet browser, go to [email or web address] 2. Click on the First Time User? Click Here link in the Sign In box. You will see the New Member Sign Up page. 3. Enter your flaregames Access Code exactly as it appears below. You will not need to use this code after youve completed the sign-up process. If you do not sign up before the expiration date, you must request a new code. · flaregames Access Code: [email or web address] Expires: 4/3/2018  3:21 PM 
 
4. Enter the last four digits of your Social Security Number (xxxx) and Date of Birth (mm/dd/yyyy) as indicated and click Submit. You will be taken to the next sign-up page. 5. Create a [employer] ID. This will be your flaregames login ID and cannot be changed, so think of one that is secure and easy to remember. 6. Create a flaregames password. You can change your password at any time. 7. Enter your Password Reset Question and Answer. This can be used at a later time if you forget your password. 8. Enter your e-mail address. You will receive e-mail notification when new information is available in [address] 9. Click Sign Up. You can now view and download portions of your medical record. 10. Click the Download Summary menu link to download a portable copy of your medical information. If you have questions, please visit the Frequently Asked Questions section of the Prezma website. Remember, Prezma is NOT to be used for urgent needs. For medical emergencies, dial 911. Now available from your iPhone and Android! Please provide this summary of care documentation to your next provider. Your primary care clinician is listed as Pamelaliudmila Pérez. If you have any questions after today's visit, please call [phone].

## 2018-01-03 NOTE — PATIENT INSTRUCTIONS
DASH Diet: Care Instructions  Your Care Instructions    The DASH diet is an eating plan that can help lower your blood pressure. DASH stands for Dietary Approaches to Stop Hypertension. Hypertension is high blood pressure. The DASH diet focuses on eating foods that are high in calcium, potassium, and magnesium. These nutrients can lower blood pressure. The foods that are highest in these nutrients are fruits, vegetables, low-fat dairy products, nuts, seeds, and legumes. But taking calcium, potassium, and magnesium supplements instead of eating foods that are high in those nutrients does not have the same effect. The DASH diet also includes whole grains, fish, and poultry. The DASH diet is one of several lifestyle changes your doctor may recommend to lower your high blood pressure. Your doctor may also want you to decrease the amount of sodium in your diet. Lowering sodium while following the DASH diet can lower blood pressure even further than just the DASH diet alone. Follow-up care is a key part of your treatment and safety. Be sure to make and go to all appointments, and call your doctor if you are having problems. It's also a good idea to know your test results and keep a list of the medicines you take. How can you care for yourself at home? Following the DASH diet  · Eat 4 to 5 servings of fruit each day. A serving is 1 medium-sized piece of fruit, ½ cup chopped or canned fruit, 1/4 cup dried fruit, or 4 ounces (½ cup) of fruit juice. Choose fruit more often than fruit juice. · Eat 4 to 5 servings of vegetables each day. A serving is 1 cup of lettuce or raw leafy vegetables, ½ cup of chopped or cooked vegetables, or 4 ounces (½ cup) of vegetable juice. Choose vegetables more often than vegetable juice. · Get 2 to 3 servings of low-fat and fat-free dairy each day. A serving is 8 ounces of milk, 1 cup of yogurt, or 1 ½ ounces of cheese. · Eat 6 to 8 servings of grains each day.  A serving is 1 slice of bread, 1 ounce of dry cereal, or ½ cup of cooked rice, pasta, or cooked cereal. Try to choose whole-grain products as much as possible. · Limit lean meat, poultry, and fish to 2 servings each day. A serving is 3 ounces, about the size of a deck of cards. · Eat 4 to 5 servings of nuts, seeds, and legumes (cooked dried beans, lentils, and split peas) each week. A serving is 1/3 cup of nuts, 2 tablespoons of seeds, or ½ cup of cooked beans or peas. · Limit fats and oils to 2 to 3 servings each day. A serving is 1 teaspoon of vegetable oil or 2 tablespoons of salad dressing. · Limit sweets and added sugars to 5 servings or less a week. A serving is 1 tablespoon jelly or jam, ½ cup sorbet, or 1 cup of lemonade. · Eat less than 2,300 milligrams (mg) of sodium a day. If you limit your sodium to 1,500 mg a day, you can lower your blood pressure even more. Tips for success  · Start small. Do not try to make dramatic changes to your diet all at once. You might feel that you are missing out on your favorite foods and then be more likely to not follow the plan. Make small changes, and stick with them. Once those changes become habit, add a few more changes. · Try some of the following:  ¨ Make it a goal to eat a fruit or vegetable at every meal and at snacks. This will make it easy to get the recommended amount of fruits and vegetables each day. ¨ Try yogurt topped with fruit and nuts for a snack or healthy dessert. ¨ Add lettuce, tomato, cucumber, and onion to sandwiches. ¨ Combine a ready-made pizza crust with low-fat mozzarella cheese and lots of vegetable toppings. Try using tomatoes, squash, spinach, broccoli, carrots, cauliflower, and onions. ¨ Have a variety of cut-up vegetables with a low-fat dip as an appetizer instead of chips and dip. ¨ Sprinkle sunflower seeds or chopped almonds over salads. Or try adding chopped walnuts or almonds to cooked vegetables.   ¨ Try some vegetarian meals using beans and peas. Add garbanzo or kidney beans to salads. Make burritos and tacos with mashed laughlin beans or black beans. Where can you learn more? Go to http://william-sebastian.info/. Enter F120 in the search box to learn more about \"DASH Diet: Care Instructions. \"  Current as of: September 21, 2016  Content Version: 11.4  © 8694-9173 [employer]. Care instructions adapted under license by [employer] (which disclaims liability or warranty for this information). If you have questions about a medical condition or this instruction, always ask your healthcare professional. [place] [address] any warranty or liability for your use of this information. Medicare Wellness Visit, Female    The best way to live healthy is to have a healthy lifestyle by eating a well-balanced diet, exercising regularly, limiting alcohol and stopping smoking. Regular physical exams and screening tests are another way to keep healthy. Preventive exams provided by your health care provider can find health problems before they become diseases or illnesses. Preventive services including immunizations, screening tests, monitoring and exams can help you take care of your own health. All people over age 72 should have a pneumovax  and and a prevnar shot to prevent pneumonia. These are once in a lifetime unless you and your provider decide differently. All people over 65 should have a yearly flu shot and a tetanus vaccine every 10 years. A bone mass density to screen for osteoporosis or thinning of the bones should be done every 2 years after 65. Screening for diabetes mellitus with a blood sugar test should be done every year.     Glaucoma is a disease of the eye due to increased ocular pressure that can lead to blindness and it should be done every year by an eye professional.    Cardiovascular screening tests that check for elevated lipids (fatty part of blood) which can lead to heart disease and strokes should be done every 5 years. Colorectal screening that evaluates for blood or polyps in your colon should be done yearly as a stool test or every five years as a flexible sigmoidoscope or every 10 years as a colonoscopy up to age 76. Breast cancer screening with a mammogram is recommended biennially  for women age 54-69. Screening for cervical cancer with a pap smear and pelvic exam is recommended for women after age 72 years every 2 years up to age 79 or when the provider and patient decide to stop. If there is a history of cervical abnormalities or other increased risk for cancer then the test is recommended yearly. Hepatitis C screening is also recommended for anyone born between [birth year] through [address]. A shingles vaccine is also recommended once in a lifetime after age 61. Your Medicare Wellness Exam is recommended annually.     Here is a list of your current Health Maintenance items with a due date:  Health Maintenance Due   Topic Date Due    Hepatitis C Test  1950    DTaP/Tdap/Td  (1 - Tdap) 06/09/1971    Stool testing for trace blood  06/09/2000    Shingles Vaccine  04/09/2010    Breast Cancer Screening  06/06/2014    Glaucoma Screening   06/09/2015    Bone Density Screening  06/09/2015    Annual Well Visit  06/09/2015

## 2018-01-03 NOTE — PROGRESS NOTES
This is an Initial Medicare Annual Wellness Exam (AWV) (Performed 12 months after IPPE or effective date of Medicare Part B enrollment, Once in a lifetime)    I have reviewed the patient's medical history in detail and updated the computerized patient record. History     Past Medical History:   Diagnosis Date    CVA (cerebral vascular accident) ([facility] [address]     distant past    Depression     with atypical psychosis    Hyperlipidemia     Hypertension     Parkinson disease ([facility] [address]     with dementia      Past Surgical History:   Procedure Laterality Date    HX HYSTERECTOMY       Current Outpatient Prescriptions   Medication Sig Dispense Refill    ferrous sulfate (IRON) 325 mg (65 mg iron) EC tablet Take 325 mg by mouth daily.  lisinopril (PRINIVIL, ZESTRIL) 5 mg tablet Take 5 mg by mouth daily.  simvastatin (ZOCOR) 10 mg tablet Take 1 Tab by mouth nightly. 90 Tab 1    traZODone (DESYREL) 50 mg tablet Take 1 Tab by mouth nightly as needed for Sleep. 30 Tab 3    aspirin 81 mg chewable tablet Take 81 mg by mouth daily.  risperidone (RISPERDAL) 2 mg tablet Take 2 mg by mouth two (2) times a day.  citalopram (CELEXA) 20 mg tablet Take 20 mg by mouth every morning. Allergies   Allergen Reactions    Ativan [Lorazepam] Other (comments)     Insomnia, visual hallucinations     Family History   Problem Relation Age of Onset    Hypertension Mother     Hypertension Father      Social History   Substance Use Topics    Smoking status: Never Smoker    Smokeless tobacco: Never Used    Alcohol use No     Patient Active Problem List   Diagnosis Code    Bradycardia R00.1    Parkinson's disease ([facility] [address] G20    Essential hypertension, benign I10    Depression F32.9    Mixed hyperlipidemia E78.2       Depression Risk Factor Screening:   No flowsheet data found. Alcohol Risk Factor Screening: You do not drink alcohol or very rarely.     Functional Ability and Level of Safety:     Hearing Loss  Hearing is good. Activities of Daily Living  The home contains: no safety equipment. Pt is cared for by home aid (lives with her blind spouse)    Fall Risk  Fall Risk Assessment, last 12 mths 1/3/2018   Able to walk? Yes   Fall in past 12 months? No       Abuse Screen  Patient is not abused    Cognitive Screening   Evaluation of Cognitive Function:  Has your family/caregiver stated any concerns about your memory: no  abnormal    Patient Care Team   Patient Care Team:  Fanny Morillo MD as PCP - General (Internal Medicine)    Assessment/Plan   Education and counseling provided:  Are appropriate based on today's review and evaluation    Diagnoses and all orders for this visit:    1. Routine general medical examination at a health care facility    2. Essential hypertension, benign    3. Mixed hyperlipidemia    4. Parkinson's disease [facility]         Health Maintenance Due   Topic Date Due    Hepatitis C Screening  1950    DTaP/Tdap/Td series (1 - Tdap) 06/09/1971    FOBT Q 1 YEAR AGE 50-75  06/09/2000    ZOSTER VACCINE AGE 60>  04/09/2010    BREAST CANCER SCRN MAMMOGRAM  06/06/2014    GLAUCOMA SCREENING Q2Y  06/09/2015    OSTEOPOROSIS SCREENING (DEXA)  06/09/2015    MEDICARE YEARLY EXAM  06/09/2015   Medicare wellness performed  Labs reviewed from 9/2017  Mammogram/colo declined  Continue dietary/activity efforts  Vaccines: flu vaccine declined  Advance directive discussed    PROGRESS NOTE  HPI:   F/u visit for routine evaluation of HTN,  hyperlipidemia, Parkinson's  Chol 143 Sept 2017  w/o chest pain/abd. discomfort; no dyspnea, cough or pedal edema; denies constitutional complaints of fever, night sweats or wt loss; no evidence of GI/ hemorrhage; no polyuria/polydipsia. Activity is sedentary    ROS is otherwise negative.     Past Medical History:   Diagnosis Date    CVA (cerebral vascular accident) ([facility] [address]     distant past    Depression     with atypical psychosis    Hyperlipidemia     Hypertension     Parkinson disease ([facility] [address]     with dementia       Past Surgical History:   Procedure Laterality Date    HX HYSTERECTOMY         Social History     Social History    Marital status:      Spouse name: N/A    Number of children: N/A    Years of education: N/A     Occupational History    Not on file. Social History Main Topics    Smoking status: Never Smoker    Smokeless tobacco: Never Used    Alcohol use No    Drug use: No    Sexual activity: Not on file     Other Topics Concern    Not on file     Social History Narrative       Allergies   Allergen Reactions    Ativan [Lorazepam] Other (comments)     Insomnia, visual hallucinations       Family History   Problem Relation Age of Onset    Hypertension Mother     Hypertension Father        Current Outpatient Prescriptions   Medication Sig Dispense Refill    ferrous sulfate (IRON) 325 mg (65 mg iron) EC tablet Take 325 mg by mouth daily.  lisinopril (PRINIVIL, ZESTRIL) 5 mg tablet Take 5 mg by mouth daily.  simvastatin (ZOCOR) 10 mg tablet Take 1 Tab by mouth nightly. 90 Tab 1    traZODone (DESYREL) 50 mg tablet Take 1 Tab by mouth nightly as needed for Sleep. 30 Tab 3    aspirin 81 mg chewable tablet Take 81 mg by mouth daily.  risperidone (RISPERDAL) 2 mg tablet Take 2 mg by mouth two (2) times a day.  citalopram (CELEXA) 20 mg tablet Take 20 mg by mouth every morning. Visit Vitals    /90 (BP 1 Location: Left arm, BP Patient Position: Sitting)    Pulse (!) 56    Temp 97.6 °F (36.4 °C) (Tympanic)    Resp 16    Ht 5' (1.524 m)    Wt 111 lb (50.3 kg)    SpO2 100%    BMI 21.68 kg/m2       PE  Well nourished in NAD  HEENT:   OP: clear. Neck: supple w/o mass or bruits. Chest: clear. CV: RRR w/o m,r,g; pulses intact. Abd: soft, NT, w/o HSM or mass. Ext: w/o edema. Neuro: +tremor    Assessment and Plan    Encounter Diagnoses   Name Primary?     Routine general medical examination at a health care facility Yes    Essential hypertension, benign     Mixed hyperlipidemia     Parkinson's disease (HCC)    HTN - BP mildly elevated - increase lisinopril to 10 mg qd  Hyperlipidemia - continue dietary/exercise efforts  Parkinson's - cx'd by psychosis/dementia - management per neuro  No change in rx  OV 6 mos or prn  I have explained plan to pt's aid and she verbalizes understanding

## 2018-01-03 NOTE — ACP (ADVANCE CARE PLANNING)
Advance Care Planning (ACP) Provider Conversation Snapshot    Date of ACP Conversation: 01/03/18  Persons included in Conversation:  aid  Length of ACP Conversation in minutes:  <16 minutes (Non-Billable)    Authorized Decision Maker (if patient is incapable of making informed decisions):    This person is:   Healthcare Agent/Medical Power of  under Advance Directive          For Patients with Decision Making Capacity:   Values/Goals: Exploration of values, goals, and preferences if recovery is not expected, even with continued medical treatment in the event of:  Imminent death    Conversation Outcomes / Follow-Up Plan:   Recommended completion of Advance Directive form after review of ACP materials and conversation with prospective healthcare agent

## 2018-01-03 NOTE — PROGRESS NOTES
1. Have you been to the ER, urgent care clinic since your last visit? Hospitalized since your last visit? No    2. Have you seen or consulted any other health care providers outside of the [address] [place] [address] since your last visit? Include any pap smears or colon screening.  No

## 2018-02-06 ENCOUNTER — TELEPHONE (OUTPATIENT)
Dept: INTERNAL MEDICINE CLINIC | Age: 68
End: 2018-02-06

## 2018-02-19 ENCOUNTER — OFFICE VISIT (OUTPATIENT)
Dept: NEUROLOGY | Age: 68
End: 2018-02-19

## 2018-02-19 VITALS
BODY MASS INDEX: 20.81 KG/M2 | HEIGHT: 60 IN | HEART RATE: 54 BPM | DIASTOLIC BLOOD PRESSURE: 70 MMHG | RESPIRATION RATE: 16 BRPM | SYSTOLIC BLOOD PRESSURE: 126 MMHG | TEMPERATURE: 97.6 F | WEIGHT: 106 LBS | OXYGEN SATURATION: 99 %

## 2018-02-19 DIAGNOSIS — I10 ESSENTIAL HYPERTENSION, BENIGN: ICD-10-CM

## 2018-02-19 DIAGNOSIS — G21.9 SECONDARY PARKINSONISM, UNSPECIFIED SECONDARY PARKINSONISM TYPE (HCC): Primary | ICD-10-CM

## 2018-02-19 DIAGNOSIS — F01.50 VASCULAR DEMENTIA WITHOUT BEHAVIORAL DISTURBANCE (HCC): ICD-10-CM

## 2018-02-19 DIAGNOSIS — F29 PSYCHOSIS, UNSPECIFIED PSYCHOSIS TYPE (HCC): ICD-10-CM

## 2018-02-19 NOTE — PROGRESS NOTES
[facility]             [address] [place] [address], [address] [place] [address] Dr Kraft, [address]    2/19/2018    HPI:  Etta Milligan is a 79 y.o., right handed,   female, who presents with tremor. Patient has long history of psychosis has been on medications for hallucinations over 30 years. She first noted to have memory difficulties over 5 years ago. She was hospitalized in 2015 at [facility] for stroke and has had left-sided weakness and tremor on the left hand since then. Patient is unable to provide history but is accompanied by her daughter. History is per daughter patient is on Risperdal through psychiatry. Daughter is unaware of any recent adjustments. She is on aspirin Namenda and donepezil for memory. She also has a history of cerebrovascular disease bradycardia depression    She returns in follow-up with her daughter. The daughter reports she was tried off of the Risperdal but was unable to tolerate coming off. She has been taken off of the Namenda as well. Memory difficulties have increased. Tremor and walking have improved she still shuffles at times. There is a resting tremor on her left arm. Left side was side affected by her stroke. Current Outpatient Prescriptions   Medication Sig Dispense Refill    ferrous sulfate (IRON) 325 mg (65 mg iron) EC tablet Take 325 mg by mouth daily.  lisinopril (PRINIVIL, ZESTRIL) 10 mg tablet Take 1 Tab by mouth daily. 90 Tab 3    simvastatin (ZOCOR) 10 mg tablet Take 1 Tab by mouth nightly. 90 Tab 1    aspirin 81 mg chewable tablet Take 81 mg by mouth daily.  risperidone (RISPERDAL) 2 mg tablet Take 2 mg by mouth two (2) times a day.  citalopram (CELEXA) 20 mg tablet Take 20 mg by mouth every morning.  traZODone (DESYREL) 50 mg tablet Take 1 Tab by mouth nightly as needed for Sleep.  27 Tab 3       Past Medical History:   Diagnosis Date    CVA (cerebral vascular accident) ([facility] [address]     distant past  Depression     with atypical psychosis    Hyperlipidemia     Hypertension     Parkinson disease ([facility] [address]     with dementia       Past Surgical History:   Procedure Laterality Date    HX HYSTERECTOMY       Family History   Problem Relation Age of Onset    Hypertension Mother     Hypertension Father      Allergies   Allergen Reactions    Ativan [Lorazepam] Other (comments)     Insomnia, visual hallucinations       Review of Systems:   Review of Systems - History obtained from child,chart review  General ROS: negative  Psychological ROS: positive for - hallucinations and memory difficulties  ENT ROS: negative  Hematological and Lymphatic ROS: negative  Endocrine ROS: negative  Respiratory ROS: no cough, shortness of breath, or wheezing  Cardiovascular ROS: no chest pain or dyspnea on exertion  Gastrointestinal ROS: no abdominal pain, change in bowel habits, or black or bloody stools  Genito-Urinary ROS: no dysuria, trouble voiding, or hematuria  Musculoskeletal ROS: negative  Neurological ROS: positive for - tremors  Dermatological ROS: negative    PHYSICAL EXAMINATION:    Visit Vitals    /70 (BP 1 Location: Left arm, BP Patient Position: Sitting)    Pulse (!) 54    Temp 97.6 °F (36.4 °C) (Oral)    Resp 16    Ht 5' (1.524 m)    Wt 48.1 kg (106 lb)    SpO2 99%    BMI 20.7 kg/m2     General:  Well defined, nourished, and groomed individual in no acute distress. Neck: Supple, nontender, thyroid within normal limits, no JVD, no bruits, no pain with resistance to active range of motion. Heart: Regular rate and rhythm, no murmurs, rub, or gallop. Normal S1S2. Lungs:  Clear to auscultation bilaterally with equal chest expansion, no cough, no wheeze  Musculoskeletal:  Extremities revealed no edema and had full range of motion of joints.     Psych:   normal affect masked facies     NEUROLOGICAL EXAMINATION:     Mental Status:   Alert and oriented to person, only not place, and time with poor  recent and remote memory . Attention span and concentration are normal. Speech is sparse  with a poor fund of knowledge. Marked apraxia    Cranial Nerves:    II, III, IV, VI:  Visual acuity grossly intact. Visual fields are normal.    Pupils are equal, round, and reactive to light and accommodation. Extra-ocular movements are full and fluid. no ptosis or nystagmus. V-XII: Hearing is grossly intact. Facial features are symmetric, with normal sensation and strength. The palate rises symmetrically and the tongue protrudes midline. Sternocleidomastoids 5/5. Motor Examination: Normal tone, bulk, and strength consistent with apraxia. No cogwheel rigidity or clonus present. Sensory exam: uncertain reliability    Coordination:  resting  tremor left hand no intention tremor    Gait and Station:  Steady ,slow while walking,unable to do stressed gait or tandem walking. No muscle wasting or fasiculations noted. Assessment and Plan:   Sonya Clemens is a 79 y.o. right handed female whose history and physical are consistent with dementia complicated by secondary Parkinsonism. Sonya Clemens who has risk factors including cerebrovascular disease,neuroleptic exposure  Diagnoses and all orders for this visit:    1. Secondary parkinsonism, unspecified secondary Parkinsonism type ([facility] [address]    2. Essential hypertension, benign    3. Vascular dementia without behavioral disturbance    4. Psychosis, unspecified psychosis type      Follow-up Disposition:  Return in about 6 months (around 8/19/2018) for Dr. Estrella Serrano. Reviewed therapies available since patient has dementia will defer since Parkinson symptoms improved   I spent  30minutes with the patient in face-to-face consultation, of which greater than 50% was spent in counseling and coordination of care as described above.

## 2018-02-19 NOTE — MR AVS SNAPSHOT
[place] 
 
 
 [address] [address] 
[phone] Patient: Glenna Garcia MRN: [id number] THT:[birth year] Visit Information Date & Time Provider Department Dept. Phone Encounter #  
 2/19/2018  9:00 AM Miguel Angel Hendrix  [facility] [address] [address] [id number] Follow-up Instructions Return in about 6 months (around 8/19/2018) for Dr. Estefany Azevedo. Follow-up and Disposition History Your Appointments 7/6/2018 10:30 AM  
Follow Up with Stacy Mcgregor MD  
[facility] [place] ([address]) Appt Note: 6 mo f/u  
 [address], [address]  
  
   
 [address], [address] [facility] [address] Upcoming Health Maintenance Date Due Hepatitis C Screening 1950 DTaP/Tdap/Td series (1 - Tdap) 6/9/1971 FOBT Q 1 YEAR AGE 50-75 6/9/2000 ZOSTER VACCINE AGE 60> 4/9/2010 BREAST CANCER SCRN MAMMOGRAM 6/6/2014 GLAUCOMA SCREENING Q2Y 6/9/2015 OSTEOPOROSIS SCREENING (DEXA) 6/9/2015 Pneumococcal 65+ Low/Medium Risk (2 of 2 - PPSV23) 10/4/2018 MEDICARE YEARLY EXAM 1/4/2019 Allergies as of 2/19/2018  Review Complete On: 2/19/2018 By: Miguel Angel Hendrix MD  
  
 Severity Noted Reaction Type Reactions Ativan [Lorazepam]  09/27/2017    Other (comments) Insomnia, visual hallucinations Current Immunizations  Never Reviewed No immunizations on file. Not reviewed this visit You Were Diagnosed With   
  
 Codes Comments Secondary parkinsonism, unspecified secondary Parkinsonism type ([facility] [address]    -  Primary ICD-10-CM: G21.9 ICD-9-CM: 332.1 Essential hypertension, benign     ICD-10-CM: I10 
ICD-9-CM: 401.1 Vascular dementia without behavioral disturbance     ICD-10-CM: F01.50 ICD-9-CM: 290.40 Psychosis, unspecified psychosis type     ICD-10-CM: F29 
ICD-9-CM: 298.9 Vitals BP Pulse Temp Resp Height(growth percentile) Weight(growth percentile) 126/70 (BP 1 Location: Left arm, BP Patient Position: Sitting) (!) 54 97.6 °F (36.4 °C) (Oral) 16 5' (1.524 m) 106 lb (48.1 kg) SpO2 BMI OB Status Smoking Status 99% 20.7 kg/m2 Postmenopausal Never Smoker Vitals History BMI and BSA Data Body Mass Index Body Surface Area 20.7 kg/m 2 1.43 m 2 Preferred Pharmacy Pharmacy Name Phone [facility] [address], [address] Your Updated Medication List  
  
   
This list is accurate as of: 2/19/18 10:47 AM.  Always use your most recent med list.  
  
  
  
  
 aspirin 81 mg chewable tablet Take 81 mg by mouth daily. citalopram 20 mg tablet Commonly known as:  Michele Peak Take 20 mg by mouth every morning. ferrous sulfate 325 mg (65 mg iron) EC tablet Commonly known as:  IRON Take 325 mg by mouth daily. lisinopril 10 mg tablet Commonly known as:  Murtaza Lynn Take 1 Tab by mouth daily. risperiDONE 2 mg tablet Commonly known as:  RisperDAL Take 2 mg by mouth two (2) times a day. simvastatin 10 mg tablet Commonly known as:  ZOCOR Take 1 Tab by mouth nightly. traZODone 50 mg tablet Commonly known as:  Lunette Grate Take 1 Tab by mouth nightly as needed for Sleep. Follow-up Instructions Return in about 6 months (around 8/19/2018) for Dr. Estrella Serrano. Introducing [facility]! Haleigh Elise introduces [employer] patient portal. Now you can access parts of your medical record, email your doctor's office, and request medication refills online. 1. In your internet browser, go to [email or web address] 2. Click on the First Time User? Click Here link in the Sign In box. You will see the New Member Sign Up page. 3. Enter your [employer] Access Code exactly as it appears below.  You will not need to use this code after youve completed the sign-up process. If you do not sign up before the expiration date, you must request a new code. · [employer] Access Code: [email or web address] Expires: 4/3/2018  3:21 PM 
 
4. Enter the last four digits of your Social Security Number (xxxx) and Date of Birth (mm/dd/yyyy) as indicated and click Submit. You will be taken to the next sign-up page. 5. Create a [employer] ID. This will be your [employer] login ID and cannot be changed, so think of one that is secure and easy to remember. 6. Create a [employer] password. You can change your password at any time. 7. Enter your Password Reset Question and Answer. This can be used at a later time if you forget your password. 8. Enter your e-mail address. You will receive e-mail notification when new information is available in [address] 9. Click Sign Up. You can now view and download portions of your medical record. 10. Click the Download Summary menu link to download a portable copy of your medical information. If you have questions, please visit the Frequently Asked Questions section of the [employer] website. Remember, [employer] is NOT to be used for urgent needs. For medical emergencies, dial 911. Now available from your iPhone and Android! Please provide this summary of care documentation to your next provider. Your primary care clinician is listed as Enrrique Chaidez. If you have any questions after today's visit, please call [phone].

## 2018-02-19 NOTE — PROGRESS NOTES
Michelle Miranda is a 79 y.o. female in today for follow-up on Parkinson's. Learning assessment previously completed 1/3/2018; primary language is [place]. 1. Have you been to the ER, urgent care clinic since your last visit? Hospitalized since your last visit? No    2. Have you seen or consulted any other health care providers outside of the [address] [place] [address] since your last visit? Include any pap smears or colon screening.  No

## 2018-04-02 ENCOUNTER — OFFICE VISIT (OUTPATIENT)
Dept: INTERNAL MEDICINE CLINIC | Age: 68
End: 2018-04-02

## 2018-04-02 VITALS
RESPIRATION RATE: 16 BRPM | SYSTOLIC BLOOD PRESSURE: 130 MMHG | BODY MASS INDEX: 20.62 KG/M2 | TEMPERATURE: 96.5 F | DIASTOLIC BLOOD PRESSURE: 72 MMHG | HEIGHT: 60 IN | WEIGHT: 105 LBS | OXYGEN SATURATION: 100 % | HEART RATE: 65 BPM

## 2018-04-02 DIAGNOSIS — G20 PARKINSON'S DISEASE (HCC): ICD-10-CM

## 2018-04-02 DIAGNOSIS — Z01.818 PREOP EXAMINATION: Primary | ICD-10-CM

## 2018-04-02 DIAGNOSIS — I10 ESSENTIAL HYPERTENSION, BENIGN: ICD-10-CM

## 2018-04-02 NOTE — PROGRESS NOTES
1. Have you been to the ER, urgent care clinic since your last visit? Hospitalized since your last visit? No    2. Have you seen or consulted any other health care providers outside of the [address] [place] [address] since your last visit? Include any pap smears or colon screening.  No

## 2018-04-02 NOTE — PROGRESS NOTES
HPI:   preop clearance for cataract ext  Multiple problems including HTN, Parkinson's  w/o chest pain/abd. discomfort; no dyspnea, cough or pedal edema; denies constitutional complaints of fever, night sweats or wt loss; no evidence of GI/ hemorrhage; no polyuria/polydipsia. Activity is sedentary    ROS is otherwise negative. Past Medical History:   Diagnosis Date    CVA (cerebral vascular accident) ([facility] [address]     distant past    Depression     with atypical psychosis    Hyperlipidemia     Hypertension     Parkinson disease ([facility] [address]     with dementia       Past Surgical History:   Procedure Laterality Date    HX HYSTERECTOMY         Social History     Social History    Marital status:      Spouse name: N/A    Number of children: N/A    Years of education: N/A     Occupational History    Not on file. Social History Main Topics    Smoking status: Never Smoker    Smokeless tobacco: Never Used    Alcohol use No    Drug use: No    Sexual activity: Not on file     Other Topics Concern    Not on file     Social History Narrative       Allergies   Allergen Reactions    Ativan [Lorazepam] Other (comments)     Insomnia, visual hallucinations       Family History   Problem Relation Age of Onset    Hypertension Mother     Hypertension Father        Current Outpatient Prescriptions   Medication Sig Dispense Refill    ferrous sulfate (IRON) 325 mg (65 mg iron) EC tablet Take 325 mg by mouth daily.  lisinopril (PRINIVIL, ZESTRIL) 10 mg tablet Take 1 Tab by mouth daily. 90 Tab 3    simvastatin (ZOCOR) 10 mg tablet Take 1 Tab by mouth nightly. 90 Tab 1    traZODone (DESYREL) 50 mg tablet Take 1 Tab by mouth nightly as needed for Sleep. 30 Tab 3    aspirin 81 mg chewable tablet Take 81 mg by mouth daily.  risperidone (RISPERDAL) 2 mg tablet Take 2 mg by mouth two (2) times a day.  citalopram (CELEXA) 20 mg tablet Take 20 mg by mouth every morning.              Visit Vitals    /72 (BP 1 Location: Right arm, BP Patient Position: Sitting)    Pulse 65    Temp 96.5 °F (35.8 °C) (Tympanic)    Resp 16    Ht 5' (1.524 m)    Wt 105 lb (47.6 kg)    SpO2 100%    BMI 20.51 kg/m2       PE  Well nourished in NAD  HEENT:  OP: clear. Neck: supple w/o mass or bruits. Chest: clear. CV: RRR w/o m,r,g; pulses intact. Abd: soft, NT, w/o HSM or mass. Ext: w/o edema. Neuro: tremor    Assessment and Plan    Encounter Diagnoses   Name Primary?     Preop examination Yes    Essential hypertension, benign     Parkinson's disease ([facility] [address]    medically cleared for low risk eye procedure  HTN - controlled  Parkinson disease - management per neuro  OV 6 mos or prn  I have explained plan to patient/family and the they verbalizes understanding

## 2018-04-02 NOTE — PATIENT INSTRUCTIONS

## 2018-07-06 ENCOUNTER — HOSPITAL ENCOUNTER (OUTPATIENT)
Dept: LAB | Age: 68
Discharge: HOME | End: 2018-07-06
Payer: MEDICARE

## 2018-07-06 ENCOUNTER — OFFICE VISIT (OUTPATIENT)
Dept: INTERNAL MEDICINE CLINIC | Age: 68
End: 2018-07-06

## 2018-07-06 VITALS
TEMPERATURE: 97.3 F | BODY MASS INDEX: 20.42 KG/M2 | SYSTOLIC BLOOD PRESSURE: 138 MMHG | RESPIRATION RATE: 16 BRPM | HEART RATE: 62 BPM | WEIGHT: 104 LBS | DIASTOLIC BLOOD PRESSURE: 80 MMHG | HEIGHT: 60 IN | OXYGEN SATURATION: 99 %

## 2018-07-06 DIAGNOSIS — I10 ESSENTIAL HYPERTENSION, BENIGN: ICD-10-CM

## 2018-07-06 DIAGNOSIS — G20 PARKINSON'S DISEASE (HCC): ICD-10-CM

## 2018-07-06 DIAGNOSIS — E78.2 MIXED HYPERLIPIDEMIA: ICD-10-CM

## 2018-07-06 DIAGNOSIS — I10 ESSENTIAL HYPERTENSION, BENIGN: Primary | ICD-10-CM

## 2018-07-06 LAB
ALBUMIN SERPL-MCNC: 3.8 G/DL (ref 3.4–5)
ALBUMIN/GLOB SERPL: 1.1 {RATIO} (ref 0.8–1.7)
ALP SERPL-CCNC: 74 U/L (ref 45–117)
ALT SERPL-CCNC: 11 U/L (ref 13–56)
ANION GAP SERPL CALC-SCNC: 8 MMOL/L (ref 3–18)
AST SERPL-CCNC: 14 U/L (ref 15–37)
BASOPHILS # BLD: 0 K/UL (ref 0–0.06)
BASOPHILS NFR BLD: 0 % (ref 0–2)
BILIRUB SERPL-MCNC: 0.6 MG/DL (ref 0.2–1)
BUN SERPL-MCNC: 11 MG/DL (ref 7–18)
BUN/CREAT SERPL: 12 (ref 12–20)
CALCIUM SERPL-MCNC: 9.2 MG/DL (ref 8.5–10.1)
CHLORIDE SERPL-SCNC: 108 MMOL/L (ref 100–108)
CHOLEST SERPL-MCNC: 228 MG/DL
CO2 SERPL-SCNC: 28 MMOL/L (ref 21–32)
CREAT SERPL-MCNC: 0.92 MG/DL (ref 0.6–1.3)
DIFFERENTIAL METHOD BLD: ABNORMAL
EOSINOPHIL # BLD: 0.1 K/UL (ref 0–0.4)
EOSINOPHIL NFR BLD: 2 % (ref 0–5)
ERYTHROCYTE [DISTWIDTH] IN BLOOD BY AUTOMATED COUNT: 13.3 % (ref 11.6–14.5)
GLOBULIN SER CALC-MCNC: 3.5 G/DL (ref 2–4)
GLUCOSE SERPL-MCNC: 94 MG/DL (ref 74–99)
HCT VFR BLD AUTO: 39.5 % (ref 35–45)
HDLC SERPL-MCNC: 109 MG/DL (ref 40–60)
HDLC SERPL: 2.1 {RATIO} (ref 0–5)
HGB BLD-MCNC: 12.6 G/DL (ref 12–16)
LDLC SERPL CALC-MCNC: 107 MG/DL (ref 0–100)
LIPID PROFILE,FLP: ABNORMAL
LYMPHOCYTES # BLD: 1.6 K/UL (ref 0.9–3.6)
LYMPHOCYTES NFR BLD: 56 % (ref 21–52)
MCH RBC QN AUTO: 28.8 PG (ref 24–34)
MCHC RBC AUTO-ENTMCNC: 31.9 G/DL (ref 31–37)
MCV RBC AUTO: 90.2 FL (ref 74–97)
MONOCYTES # BLD: 0.3 K/UL (ref 0.05–1.2)
MONOCYTES NFR BLD: 11 % (ref 3–10)
NEUTS SEG # BLD: 0.9 K/UL (ref 1.8–8)
NEUTS SEG NFR BLD: 31 % (ref 40–73)
PLATELET # BLD AUTO: 199 K/UL (ref 135–420)
PMV BLD AUTO: 12.5 FL (ref 9.2–11.8)
POTASSIUM SERPL-SCNC: 3.6 MMOL/L (ref 3.5–5.5)
PROT SERPL-MCNC: 7.3 G/DL (ref 6.4–8.2)
RBC # BLD AUTO: 4.38 M/UL (ref 4.2–5.3)
SODIUM SERPL-SCNC: 144 MMOL/L (ref 136–145)
TRIGL SERPL-MCNC: 60 MG/DL (ref ?–150)
VLDLC SERPL CALC-MCNC: 12 MG/DL
WBC # BLD AUTO: 2.8 K/UL (ref 4.6–13.2)

## 2018-07-06 PROCEDURE — 85025 COMPLETE CBC W/AUTO DIFF WBC: CPT | Performed by: INTERNAL MEDICINE

## 2018-07-06 PROCEDURE — 80053 COMPREHEN METABOLIC PANEL: CPT | Performed by: INTERNAL MEDICINE

## 2018-07-06 PROCEDURE — 36415 COLL VENOUS BLD VENIPUNCTURE: CPT | Performed by: INTERNAL MEDICINE

## 2018-07-06 PROCEDURE — 80061 LIPID PANEL: CPT | Performed by: INTERNAL MEDICINE

## 2018-07-06 RX ORDER — FLUVOXAMINE MALEATE 50 MG/1
TABLET ORAL
COMMUNITY

## 2018-07-06 NOTE — PATIENT INSTRUCTIONS

## 2018-07-06 NOTE — MR AVS SNAPSHOT
[facility] 
 
 
 [address], [address] [place] [address] 
[phone] Patient: Kenji Meyers MRN: [id number] TFE:[birth year] Visit Information Date & Time Provider Department Dept. Phone Encounter #  
 7/6/2018 10:30 AM Petr Appiah MD [facility] INTERNAL MEDICINE [facility] [phone] [id number] Follow-up Instructions Return if symptoms worsen or fail to improve. Follow-up and Disposition History Your Appointments 1/8/2019 10:00 AM  
Follow Up with Petr Appiah MD  
[facility] INTERNAL MEDICINE OF [place] (Chidi Khan) Appt Note: 6 mo f/u  
 [address], [address] [place] [address]  
  
   
 [address], [address] [place] [address] Upcoming Health Maintenance Date Due Hepatitis C Screening 1950 DTaP/Tdap/Td series (1 - Tdap) 6/9/1971 FOBT Q 1 YEAR AGE 50-75 6/9/2000 ZOSTER VACCINE AGE 60> 4/9/2010 BREAST CANCER SCRN MAMMOGRAM 6/6/2014 GLAUCOMA SCREENING Q2Y 6/9/2015 Bone Densitometry (Dexa) Screening 6/9/2015 Influenza Age 5 to Adult 8/1/2018 Pneumococcal 65+ Low/Medium Risk (2 of 2 - PPSV23) 10/4/2018 MEDICARE YEARLY EXAM 1/4/2019 Allergies as of 7/6/2018  Review Complete On: 7/6/2018 By: Petr Appiah MD  
  
 Severity Noted Reaction Type Reactions Ativan [Lorazepam]  09/27/2017    Other (comments) Insomnia, visual hallucinations Current Immunizations  Reviewed on 3/30/2018 Name Date Influenza High Dose Vaccine PF 8/14/2015 Influenza Vaccine 9/14/2014 Not reviewed this visit You Were Diagnosed With   
  
 Codes Comments Essential hypertension, benign    -  Primary ICD-10-CM: I10 
ICD-9-CM: 401.1 Mixed hyperlipidemia     ICD-10-CM: E78.2 ICD-9-CM: 272.2 Parkinson's disease ([facility] [address]     ICD-10-CM: G20 
ICD-9-CM: 332.0 Vitals BP Pulse Temp Resp Height(growth percentile) Weight(growth percentile) 138/80 62 97.3 °F (36.3 °C) (Tympanic) 16 5' (1.524 m) 104 lb (47.2 kg) SpO2 BMI OB Status Smoking Status 99% 20.31 kg/m2 Postmenopausal Never Smoker Vitals History BMI and BSA Data Body Mass Index Body Surface Area  
 20.31 kg/m 2 1.41 m 2 Your Updated Medication List  
  
   
This list is accurate as of 7/6/18 11:02 AM.  Always use your most recent med list.  
  
  
  
  
 aspirin 81 mg chewable tablet Take 81 mg by mouth daily. ferrous sulfate 325 mg (65 mg iron) EC tablet Commonly known as:  IRON Take 325 mg by mouth daily. fluvoxaMINE 50 mg tablet Commonly known as:  Nubia Brunette Take  by mouth nightly. lisinopril 10 mg tablet Commonly known as:  Marge Gale Take 1 Tab by mouth daily. risperiDONE 2 mg tablet Commonly known as:  RisperDAL Take 2 mg by mouth two (2) times a day. simvastatin 10 mg tablet Commonly known as:  ZOCOR Take 1 Tab by mouth nightly. traZODone 50 mg tablet Commonly known as:  [place] Take 1 Tab by mouth nightly as needed for Sleep. Follow-up Instructions Return if symptoms worsen or fail to improve. Patient Instructions DASH Diet: Care Instructions Your Care Instructions The DASH diet is an eating plan that can help lower your blood pressure. DASH stands for Dietary Approaches to Stop Hypertension. Hypertension is high blood pressure. The DASH diet focuses on eating foods that are high in calcium, potassium, and magnesium. These nutrients can lower blood pressure. The foods that are highest in these nutrients are fruits, vegetables, low-fat dairy products, nuts, seeds, and legumes. But taking calcium, potassium, and magnesium supplements instead of eating foods that are high in those nutrients does not have the same effect. The DASH diet also includes whole grains, fish, and poultry. The DASH diet is one of several lifestyle changes your doctor may recommend to lower your high blood pressure. Your doctor may also want you to decrease the amount of sodium in your diet. Lowering sodium while following the DASH diet can lower blood pressure even further than just the DASH diet alone. Follow-up care is a key part of your treatment and safety. Be sure to make and go to all appointments, and call your doctor if you are having problems. It's also a good idea to know your test results and keep a list of the medicines you take. How can you care for yourself at home? Following the DASH diet · Eat 4 to 5 servings of fruit each day. A serving is 1 medium-sized piece of fruit, ½ cup chopped or canned fruit, 1/4 cup dried fruit, or 4 ounces (½ cup) of fruit juice. Choose fruit more often than fruit juice. · Eat 4 to 5 servings of vegetables each day. A serving is 1 cup of lettuce or raw leafy vegetables, ½ cup of chopped or cooked vegetables, or 4 ounces (½ cup) of vegetable juice. Choose vegetables more often than vegetable juice. · Get 2 to 3 servings of low-fat and fat-free dairy each day. A serving is 8 ounces of milk, 1 cup of yogurt, or 1 ½ ounces of cheese. · Eat 6 to 8 servings of grains each day. A serving is 1 slice of bread, 1 ounce of dry cereal, or ½ cup of cooked rice, pasta, or cooked cereal. Try to choose whole-grain products as much as possible. · Limit lean meat, poultry, and fish to 2 servings each day. A serving is 3 ounces, about the size of a deck of cards. · Eat 4 to 5 servings of nuts, seeds, and legumes (cooked dried beans, lentils, and split peas) each week. A serving is 1/3 cup of nuts, 2 tablespoons of seeds, or ½ cup of cooked beans or peas. · Limit fats and oils to 2 to 3 servings each day. A serving is 1 teaspoon of vegetable oil or 2 tablespoons of salad dressing. · Limit sweets and added sugars to 5 servings or less a week.  A serving is 1 tablespoon jelly or jam, ½ cup sorbet, or 1 cup of lemonade. · Eat less than 2,300 milligrams (mg) of sodium a day. If you limit your sodium to 1,500 mg a day, you can lower your blood pressure even more. Tips for success · Start small. Do not try to make dramatic changes to your diet all at once. You might feel that you are missing out on your favorite foods and then be more likely to not follow the plan. Make small changes, and stick with them. Once those changes become habit, add a few more changes. · Try some of the following: ¨ Make it a goal to eat a fruit or vegetable at every meal and at snacks. This will make it easy to get the recommended amount of fruits and vegetables each day. ¨ Try yogurt topped with fruit and nuts for a snack or healthy dessert. ¨ Add lettuce, tomato, cucumber, and onion to sandwiches. ¨ Combine a ready-made pizza crust with low-fat mozzarella cheese and lots of vegetable toppings. Try using tomatoes, squash, spinach, broccoli, carrots, cauliflower, and onions. ¨ Have a variety of cut-up vegetables with a low-fat dip as an appetizer instead of chips and dip. ¨ Sprinkle sunflower seeds or chopped almonds over salads. Or try adding chopped walnuts or almonds to cooked vegetables. ¨ Try some vegetarian meals using beans and peas. Add garbanzo or kidney beans to salads. Make burritos and tacos with mashed laughlin beans or black beans. Where can you learn more? Go to http://william-sebastian.info/. Enter S079 in the search box to learn more about \"DASH Diet: Care Instructions. \" Current as of: September 21, 2016 Content Version: 11.4 © 9776-5015 [employer]. Care instructions adapted under license by [employer] (which disclaims liability or warranty for this information).  If you have questions about a medical condition or this instruction, always ask your healthcare professional. [employer] disclaims any warranty or liability for your use of this information. Introducing [facility]! [facility] introduces [employer] patient portal. Now you can access parts of your medical record, email your doctor's office, and request medication refills online. 1. In your internet browser, go to [email or web address] 2. Click on the First Time User? Click Here link in the Sign In box. You will see the New Member Sign Up page. 3. Enter your [employer] Access Code exactly as it appears below. You will not need to use this code after youve completed the sign-up process. If you do not sign up before the expiration date, you must request a new code. · [employer] Access Code: [email or web address] Expires: 10/4/2018 10:21 AM 
 
4. Enter the last four digits of your Social Security Number (xxxx) and Date of Birth (mm/dd/yyyy) as indicated and click Submit. You will be taken to the next sign-up page. 5. Create a [employer] ID. This will be your [employer] login ID and cannot be changed, so think of one that is secure and easy to remember. 6. Create a [employer] password. You can change your password at any time. 7. Enter your Password Reset Question and Answer. This can be used at a later time if you forget your password. 8. Enter your e-mail address. You will receive e-mail notification when new information is available in [address] 9. Click Sign Up. You can now view and download portions of your medical record. 10. Click the Download Summary menu link to download a portable copy of your medical information. If you have questions, please visit the Frequently Asked Questions section of the [employer] website. Remember, [employer] is NOT to be used for urgent needs. For medical emergencies, dial 911. Now available from your iPhone and Android! Please provide this summary of care documentation to your next provider. Your primary care clinician is listed as Annemarie Deluna. If you have any questions after today's visit, please call [phone].

## 2018-07-06 NOTE — PROGRESS NOTES
HPI:   F/u visit for routine evaluation of HTN,  hyperlipidemia, Parkinson's disease with depression/cognitive impairment   No acute issues  w/o chest pain/abd. discomfort; no dyspnea, cough or pedal edema; denies constitutional complaints of fever, night sweats or wt loss; no evidence of GI/ hemorrhage    ROS is otherwise negative. Past Medical History:   Diagnosis Date    CVA (cerebral vascular accident) ([facility] [address]     distant past    Depression     with atypical psychosis    Hyperlipidemia     Hypertension     Parkinson disease ([facility] [address]     with dementia       Past Surgical History:   Procedure Laterality Date    HX HYSTERECTOMY         Social History     Social History    Marital status:      Spouse name: N/A    Number of children: N/A    Years of education: N/A     Occupational History    Not on file. Social History Main Topics    Smoking status: Never Smoker    Smokeless tobacco: Never Used    Alcohol use No    Drug use: No    Sexual activity: Not on file     Other Topics Concern    Not on file     Social History Narrative       Allergies   Allergen Reactions    Ativan [Lorazepam] Other (comments)     Insomnia, visual hallucinations       Family History   Problem Relation Age of Onset    Hypertension Mother     Hypertension Father        Current Outpatient Prescriptions   Medication Sig Dispense Refill    fluvoxaMINE (LUVOX) 50 mg tablet Take  by mouth nightly.  ferrous sulfate (IRON) 325 mg (65 mg iron) EC tablet Take 325 mg by mouth daily.  lisinopril (PRINIVIL, ZESTRIL) 10 mg tablet Take 1 Tab by mouth daily. 90 Tab 3    simvastatin (ZOCOR) 10 mg tablet Take 1 Tab by mouth nightly. 90 Tab 1    traZODone (DESYREL) 50 mg tablet Take 1 Tab by mouth nightly as needed for Sleep. 30 Tab 3    aspirin 81 mg chewable tablet Take 81 mg by mouth daily.  risperidone (RISPERDAL) 2 mg tablet Take 2 mg by mouth two (2) times a day.              Visit Vitals    /80    Pulse 62    Temp 97.3 °F (36.3 °C) (Tympanic)    Resp 16    Ht 5' (1.524 m)    Wt 104 lb (47.2 kg)    SpO2 99%    BMI 20.31 kg/m2       PE  Well nourished in NAD  HEENT:  OP: clear. Neck: supple w/o mass or bruits. Chest: clear. CV: RRR w/o m,r,g; pulses intact. Abd: soft, NT, w/o HSM or mass. Ext: w/o edema. Neuro: tremor    Assessment and Plan    Encounter Diagnoses   Name Primary?     Essential hypertension, benign Yes    Mixed hyperlipidemia     Parkinson's disease ([facility] [address]    HTN - controlled  Hyperlipidemia - labs soon to assess  Parkinson's disease with dementia/depression (management per neurp/psych)  Continue dietary efforts  OV 6 mos or prn  I have explained plan to family/patient and the they verbalizes understanding

## 2018-07-06 NOTE — PROGRESS NOTES
1. Have you been to the ER, urgent care clinic since your last visit? Hospitalized since your last visit? No    2. Have you seen or consulted any other health care providers outside of the [address] [place] [address] since your last visit? Include any pap smears or colon screening.  No

## 2019-01-08 ENCOUNTER — OFFICE VISIT (OUTPATIENT)
Dept: INTERNAL MEDICINE CLINIC | Age: 69
End: 2019-01-08

## 2019-01-08 VITALS
SYSTOLIC BLOOD PRESSURE: 138 MMHG | OXYGEN SATURATION: 99 % | DIASTOLIC BLOOD PRESSURE: 84 MMHG | HEIGHT: 60 IN | BODY MASS INDEX: 22.38 KG/M2 | TEMPERATURE: 96.8 F | WEIGHT: 114 LBS | RESPIRATION RATE: 16 BRPM | HEART RATE: 55 BPM

## 2019-01-08 DIAGNOSIS — Z00.00 ROUTINE GENERAL MEDICAL EXAMINATION AT A HEALTH CARE FACILITY: Primary | ICD-10-CM

## 2019-01-08 DIAGNOSIS — I10 ESSENTIAL HYPERTENSION, BENIGN: ICD-10-CM

## 2019-01-08 DIAGNOSIS — Z23 ENCOUNTER FOR IMMUNIZATION: ICD-10-CM

## 2019-01-08 DIAGNOSIS — E78.2 MIXED HYPERLIPIDEMIA: ICD-10-CM

## 2019-01-08 NOTE — PROGRESS NOTES
This is the Subsequent Medicare Annual Wellness Exam, performed 12 months or more after the Initial AWV or the last Subsequent AWV I have reviewed the patient's medical history in detail and updated the computerized patient record. History Past Medical History:  
Diagnosis Date  CVA (cerebral vascular accident) ([facility] [address] distant past  
 Depression   
 with atypical psychosis  Hyperlipidemia  Hypertension  [facility] with dementia Past Surgical History:  
Procedure Laterality Date  HX HYSTERECTOMY Current Outpatient Medications Medication Sig Dispense Refill  fluvoxaMINE (LUVOX) 50 mg tablet Take  by mouth nightly.  ferrous sulfate (IRON) 325 mg (65 mg iron) EC tablet Take 325 mg by mouth daily.  lisinopril (PRINIVIL, ZESTRIL) 10 mg tablet Take 1 Tab by mouth daily. 90 Tab 3  
 simvastatin (ZOCOR) 10 mg tablet Take 1 Tab by mouth nightly. 90 Tab 1  
 traZODone (DESYREL) 50 mg tablet Take 1 Tab by mouth nightly as needed for Sleep. 30 Tab 3  
 aspirin 81 mg chewable tablet Take 81 mg by mouth daily.  risperidone (RISPERDAL) 2 mg tablet Take 2 mg by mouth two (2) times a day. Allergies Allergen Reactions  Ativan [Lorazepam] Other (comments) Insomnia, visual hallucinations Family History Problem Relation Age of Onset  Hypertension Mother  Hypertension Father Social History Tobacco Use  Smoking status: Never Smoker  Smokeless tobacco: Never Used Substance Use Topics  Alcohol use: No  
 
Patient Active Problem List  
Diagnosis Code  Bradycardia R00.1  Parkinson's disease ([facility] G20  
 Essential hypertension, benign I10  
 Depression F32.9  Mixed hyperlipidemia E78.2 Depression Risk Factor Screening: No flowsheet data found. Alcohol Risk Factor Screening: You do not drink alcohol or very rarely. Functional Ability and Level of Safety:  
Hearing Loss Hearing is good. Activities of Daily Living The home contains: no safety equipment. Patient needs help with:  transportation, preparing meals, managing medications and managing money Fall Risk Fall Risk Assessment, last 12 mths 1/3/2018 Able to walk? Yes Fall in past 12 months? No  
 
 
Abuse Screen Patient is not abused Cognitive Screening Evaluation of Cognitive Function: 
Has your family/caregiver stated any concerns about your memory: yes Normal, Abnormal 
 
Patient Care Team  
Patient Care Team: 
Brooke Carbajal MD as PCP - General (Internal Medicine) Assessment/Plan Education and counseling provided: 
Are appropriate based on today's review and evaluation Diagnoses and all orders for this visit: 1. Routine general medical examination at a health care facility 2. Essential hypertension, benign 3. Mixed hyperlipidemia Health Maintenance Due Topic Date Due  
 Hepatitis C Screening  1950  DTaP/Tdap/Td series (1 - Tdap) 06/09/1971  Shingrix Vaccine Age 50> (1 of 2) 06/09/2000  
 FOBT Q 1 YEAR AGE 50-75  06/09/2000  BREAST CANCER SCRN MAMMOGRAM  06/06/2014  GLAUCOMA SCREENING Q2Y  06/09/2015  Bone Densitometry (Dexa) Screening  06/09/2015  Influenza Age 5 to Adult  08/01/2018  Pneumococcal 65+ Low/Medium Risk (2 of 2 - PPSV23) 10/04/2018  MEDICARE YEARLY EXAM  01/04/2019 Medicare wellness performed Labs reviewed from July 2018 Continue dietary efforts Vaccines: flu vaccine given Advance directive discussed PROGRESS NOTE 
 
HPI:  
F/u visit for routine evaluation of HTN,  hyperlipidemia Psychiatry/neurology sees pt for depression, dementia/Parkinson's 
w/o chest pain/abd. discomfort; no dyspnea, cough or pedal edema; denies constitutional complaints of fever, night sweats or wt loss; no evidence of GI/ hemorrhage ROS is otherwise negative. Past Medical History:  
Diagnosis Date  CVA (cerebral vascular accident) ([address]  distant past  
  Depression   
 with atypical psychosis  Hyperlipidemia  Hypertension  Parkinson disease ([facility] [address] with dementia Past Surgical History:  
Procedure Laterality Date  HX HYSTERECTOMY Social History Socioeconomic History  Marital status:  Spouse name: Not on file  Number of children: Not on file  Years of education: Not on file  Highest education level: Not on file Social Needs  Financial resource strain: Not on file  Food insecurity - worry: Not on file  Food insecurity - inability: Not on file  Transportation needs - medical: Not on file  Transportation needs - non-medical: Not on file Occupational History  Not on file Tobacco Use  Smoking status: Never Smoker  Smokeless tobacco: Never Used Substance and Sexual Activity  Alcohol use: No  
 Drug use: No  
 Sexual activity: Not on file Other Topics Concern  Not on file Social History Narrative  Not on file Allergies Allergen Reactions  Ativan [Lorazepam] Other (comments) Insomnia, visual hallucinations Family History Problem Relation Age of Onset  Hypertension Mother  Hypertension Father Current Outpatient Medications Medication Sig Dispense Refill  fluvoxaMINE (LUVOX) 50 mg tablet Take  by mouth nightly.  ferrous sulfate (IRON) 325 mg (65 mg iron) EC tablet Take 325 mg by mouth daily.  lisinopril (PRINIVIL, ZESTRIL) 10 mg tablet Take 1 Tab by mouth daily. 90 Tab 3  
 simvastatin (ZOCOR) 10 mg tablet Take 1 Tab by mouth nightly. 90 Tab 1  
 traZODone (DESYREL) 50 mg tablet Take 1 Tab by mouth nightly as needed for Sleep. 30 Tab 3  
 aspirin 81 mg chewable tablet Take 81 mg by mouth daily.  risperidone (RISPERDAL) 2 mg tablet Take 2 mg by mouth two (2) times a day. Visit Vitals /84 (BP 1 Location: Left arm, BP Patient Position: Sitting) Pulse (!) 55 Temp 96.8 °F (36 °C) (Tympanic) Resp 16 Ht 5' (1.524 m) Wt 114 lb (51.7 kg) SpO2 99% BMI 22.26 kg/m² PE 
thin in NAD HEENT:   OP: clear. Neck: supple w/o mass or bruits. Chest: clear. CV: RRR w/o m,r,g; pulses intact. Abd: soft, NT, w/o HSM or mass. Ext: w/o edema. Neuro: tremor Assessment and Plan Encounter Diagnoses Name Primary?  Routine general medical examination at a health care facility Yes  Essential hypertension, benign  Mixed hyperlipidemia HTN - controlled 
hyperlipidemia - continue dietary efforts No change in rx OV 6 mos or prn I have explained plan to patient/family and the they verbalizes understanding

## 2019-01-08 NOTE — PROGRESS NOTES
1. Have you been to the ER, urgent care clinic since your last visit? Hospitalized since your last visit? No 
 
2. Have you seen or consulted any other health care providers outside of the [address] [place] [address] since your last visit? Include any pap smears or colon screening.  No

## 2019-01-08 NOTE — PATIENT INSTRUCTIONS
Medicare Wellness Visit, Female The best way to live healthy is to have a lifestyle where you eat a well-balanced diet, exercise regularly, limit alcohol use, and quit all forms of tobacco/nicotine, if applicable. Regular preventive services are another way to keep healthy. Preventive services (vaccines, screening tests, monitoring & exams) can help personalize your care plan, which helps you manage your own care. Screening tests can find health problems at the earliest stages, when they are easiest to treat. Yonathan Arzate follows the current, evidence-based guidelines published by the [facility] ([facility]) when recommending preventive services for our patients. Because we follow these guidelines, sometimes recommendations change over time as research supports it. (For example, mammograms used to be recommended annually. Even though Medicare will still pay for an annual mammogram, the newer guidelines recommend a mammogram every two years for women of average risk.) Of course, you and your doctor may decide to screen more often for some diseases, based on your risk and your health status. Preventive services for you include: - Medicare offers their members a free annual wellness visit, which is time for you and your primary care provider to discuss and plan for your preventive service needs. Take advantage of this benefit every year! 
-All adults over the age of 72 should receive the recommended pneumonia vaccines. Current USPSTF guidelines recommend a series of two vaccines for the best pneumonia protection.  
-All adults should have a flu vaccine yearly and a tetanus vaccine every 10 years. All adults age 61 and older should receive a shingles vaccine once in their lifetime.   
-A bone mass density test is recommended when a woman turns 65 to screen for osteoporosis. This test is only recommended one time, as a screening. Some providers will use this same test as a disease monitoring tool if you already have osteoporosis. -All adults age 38-68 who are overweight should have a diabetes screening test once every three years.  
-Other screening tests and preventive services for persons with diabetes include: an eye exam to screen for diabetic retinopathy, a kidney function test, a foot exam, and stricter control over your cholesterol.  
-Cardiovascular screening for adults with routine risk involves an electrocardiogram (ECG) at intervals determined by your doctor.  
-Colorectal cancer screenings should be done for adults age 54-65 with no increased risk factors for colorectal cancer. There are a number of acceptable methods of screening for this type of cancer. Each test has its own benefits and drawbacks. Discuss with your doctor what is most appropriate for you during your annual wellness visit. The different tests include: colonoscopy (considered the best screening method), a fecal occult blood test, a fecal DNA test, and sigmoidoscopy. -Breast cancer screenings are recommended every other year for women of normal risk, age 54-69. 
-Cervical cancer screenings for women over age 72 are only recommended with certain risk factors.  
-All adults born between [facility] should be screened once for Hepatitis C. Here is a list of your current Health Maintenance items (your personalized list of preventive services) with a due date: 
Health Maintenance Due Topic Date Due  
 Hepatitis C Test  1950  DTaP/Tdap/Td  (1 - Tdap) 06/09/1971  Shingles Vaccine (1 of 2) 06/09/2000  Stool testing for trace blood  06/09/2000  Breast Cancer Screening  06/06/2014  Glaucoma Screening   06/09/2015  Bone Mineral Density   06/09/2015  Flu Vaccine  08/01/2018  Pneumococcal Vaccine (2 of 2 - PPSV23) 10/04/2018 [address] [place] [address] Annual Well Visit  01/04/2019 DASH Diet: Care Instructions Your Care Instructions The DASH diet is an eating plan that can help lower your blood pressure. DASH stands for Dietary Approaches to Stop Hypertension. Hypertension is high blood pressure. The DASH diet focuses on eating foods that are high in calcium, potassium, and magnesium. These nutrients can lower blood pressure. The foods that are highest in these nutrients are fruits, vegetables, low-fat dairy products, nuts, seeds, and legumes. But taking calcium, potassium, and magnesium supplements instead of eating foods that are high in those nutrients does not have the same effect. The DASH diet also includes whole grains, fish, and poultry. The DASH diet is one of several lifestyle changes your doctor may recommend to lower your high blood pressure. Your doctor may also want you to decrease the amount of sodium in your diet. Lowering sodium while following the DASH diet can lower blood pressure even further than just the DASH diet alone. Follow-up care is a key part of your treatment and safety. Be sure to make and go to all appointments, and call your doctor if you are having problems. It's also a good idea to know your test results and keep a list of the medicines you take. How can you care for yourself at home? Following the DASH diet · Eat 4 to 5 servings of fruit each day. A serving is 1 medium-sized piece of fruit, ½ cup chopped or canned fruit, 1/4 cup dried fruit, or 4 ounces (½ cup) of fruit juice. Choose fruit more often than fruit juice. · Eat 4 to 5 servings of vegetables each day. A serving is 1 cup of lettuce or raw leafy vegetables, ½ cup of chopped or cooked vegetables, or 4 ounces (½ cup) of vegetable juice. Choose vegetables more often than vegetable juice. · Get 2 to 3 servings of low-fat and fat-free dairy each day. A serving is 8 ounces of milk, 1 cup of yogurt, or 1 ½ ounces of cheese. · Eat 6 to 8 servings of grains each day.  A serving is 1 slice of bread, 1 ounce of dry cereal, or ½ cup of cooked rice, pasta, or cooked cereal. Try to choose whole-grain products as much as possible. · Limit lean meat, poultry, and fish to 2 servings each day. A serving is 3 ounces, about the size of a deck of cards. · Eat 4 to 5 servings of nuts, seeds, and legumes (cooked dried beans, lentils, and split peas) each week. A serving is 1/3 cup of nuts, 2 tablespoons of seeds, or ½ cup of cooked beans or peas. · Limit fats and oils to 2 to 3 servings each day. A serving is 1 teaspoon of vegetable oil or 2 tablespoons of salad dressing. · Limit sweets and added sugars to 5 servings or less a week. A serving is 1 tablespoon jelly or jam, ½ cup sorbet, or 1 cup of lemonade. · Eat less than 2,300 milligrams (mg) of sodium a day. If you limit your sodium to 1,500 mg a day, you can lower your blood pressure even more. Tips for success · Start small. Do not try to make dramatic changes to your diet all at once. You might feel that you are missing out on your favorite foods and then be more likely to not follow the plan. Make small changes, and stick with them. Once those changes become habit, add a few more changes. · Try some of the following: ? Make it a goal to eat a fruit or vegetable at every meal and at snacks. This will make it easy to get the recommended amount of fruits and vegetables each day. ? Try yogurt topped with fruit and nuts for a snack or healthy dessert. ? Add lettuce, tomato, cucumber, and onion to sandwiches. ? Combine a ready-made pizza crust with low-fat mozzarella cheese and lots of vegetable toppings. Try using tomatoes, squash, spinach, broccoli, carrots, cauliflower, and onions. ? Have a variety of cut-up vegetables with a low-fat dip as an appetizer instead of chips and dip. ? Sprinkle sunflower seeds or chopped almonds over salads. Or try adding chopped walnuts or almonds to cooked vegetables. ? Try some vegetarian meals using beans and peas. Add garbanzo or kidney beans to salads. Make burritos and tacos with mashed laughlin beans or black beans. Where can you learn more? Go to http://william-sebastian.info/. Enter Q784 in the search box to learn more about \"DASH Diet: Care Instructions. \" Current as of: December 6, 2017 Content Version: 11.8 © 5048-0058 [employer]. Care instructions adapted under license by [employer] (which disclaims liability or warranty for this information). If you have questions about a medical condition or this instruction, always ask your healthcare professional. [address] any warranty or liability for your use of this information.

## 2019-04-10 ENCOUNTER — TELEPHONE (OUTPATIENT)
Dept: FAMILY MEDICINE CLINIC | Age: 69
End: 2019-04-10

## 2019-04-10 RX ORDER — LISINOPRIL 10 MG/1
10 TABLET ORAL DAILY
Qty: 90 TAB | Refills: 3 | Status: SHIPPED | OUTPATIENT
Start: 2019-04-10 | End: 2020-03-13

## 2019-04-10 RX ORDER — SIMVASTATIN 10 MG/1
10 TABLET, FILM COATED ORAL
Qty: 90 TAB | Refills: 3 | Status: SHIPPED | OUTPATIENT
Start: 2019-04-10 | End: 2019-10-23

## 2019-04-10 NOTE — TELEPHONE ENCOUNTER
Prescription called to pharmacy--lisinopril and zocor   [facility] [address], [place] [address] [facility] (Pharmacy) [phone]

## 2019-10-17 NOTE — PROGRESS NOTES
HPI:   Recent hospitalization at [facility] post fall  Fall believed d/t weakness from dehydration d/t UTI (citrobacter)  Multiple imaging studies done w/o evid of fx or acute CVA  SNF advised, family wanted pt's care at home  Medical condition improved with IV abx/fluids  Hx notable for Parkinsons with dementia and HTN  currently w/o chest pain/abd. discomfort; no dyspnea, cough or pedal edema; denies constitutional complaints of fever, night sweats; no evidence of GI/ hemorrhage    ROS is otherwise negative.     Past Medical History:   Diagnosis Date    CVA [facility]cerebral vascular accident) ([facility] [address]     distant past    Depression     with atypical psychosis    Hyperlipidemia     Hypertension     Parkinson disease ([facility] [address]     with dementia       Past Surgical History:   Procedure Laterality Date    HX HYSTERECTOMY         Social History     Socioeconomic History    Marital status:      Spouse name: Not on file    Number of children: Not on file    Years of education: Not on file    Highest education level: Not on file   Occupational History    Not on file   Social Needs    Financial resource strain: Not on file    Food insecurity:     Worry: Not on file     Inability: Not on file    Transportation needs:     Medical: Not on file     Non-medical: Not on file   Tobacco Use    Smoking status: Never Smoker    Smokeless tobacco: Never Used   Substance and Sexual Activity    Alcohol use: No    Drug use: No    Sexual activity: Not on file   Lifestyle    Physical activity:     Days per week: Not on file     Minutes per session: Not on file    Stress: Not on file   Relationships    Social connections:     Talks on phone: Not on file     Gets together: Not on file     Attends Muslim service: Not on file     Active member of club or organization: Not on file     Attends meetings of clubs or organizations: Not on file     Relationship status: Not on file    Intimate partner violence:     Fear of current or ex partner: Not on file     Emotionally abused: Not on file     Physically abused: Not on file     Forced sexual activity: Not on file   Other Topics Concern    Not on file   Social History Narrative    Not on file       Allergies   Allergen Reactions    Ativan [Lorazepam] Other (comments)     Insomnia, visual hallucinations       Family History   Problem Relation Age of Onset    Hypertension Mother     Hypertension Father        Current Outpatient Medications   Medication Sig Dispense Refill    atorvastatin (LIPITOR) 40 mg tablet Take 1 Tab by mouth daily. 90 Tab 3    lisinopril (PRINIVIL, ZESTRIL) 10 mg tablet Take 1 Tab by mouth daily. 90 Tab 3    fluvoxaMINE (LUVOX) 50 mg tablet Take  by mouth nightly.  aspirin 81 mg chewable tablet Take 81 mg by mouth daily.  risperidone (RISPERDAL) 2 mg tablet Take 2 mg by mouth two (2) times a day. Visit Vitals  /70 (BP 1 Location: Left arm, BP Patient Position: Sitting)   Pulse 69   Temp 97 °F (36.1 °C) (Tympanic)   Resp 16   Ht 5' (1.524 m)   Wt 102 lb (46.3 kg)   SpO2 96%   BMI 19.92 kg/m²       PE  thin in NAD  HEENT:   OP: clear. Neck: supple w/o mass or bruits. Chest: clear. CV: RRR w/o m,r,g; pulses intact. Abd: soft, NT, w/o HSM or mass. Ext: w/o edema. Neuro: +tremor with confusion    Assessment and Plan    Encounter Diagnoses   Name Primary?     Urinary tract infection without hematuria, site unspecified Yes    Fall, subsequent encounter     Essential hypertension, benign     Parkinson's disease ([facility] [address]        Parkinsons with dementia/depression/psychosis; psychiatric meds per psychiatry  Recent hospitalization post fall d/t dehydration/UTI - family helping pt with ADLs  Prognosis is guarded; pt would benefit from as much home nursing as her insurance will allow as she needs help with all ADLs  HTN - controlled  OV 3 mos or prn  I have explained plan to quoc and she verbalizes understanding

## 2019-10-23 ENCOUNTER — OFFICE VISIT (OUTPATIENT)
Dept: FAMILY MEDICINE CLINIC | Age: 69
End: 2019-10-23

## 2019-10-23 VITALS
SYSTOLIC BLOOD PRESSURE: 100 MMHG | DIASTOLIC BLOOD PRESSURE: 70 MMHG | HEIGHT: 60 IN | BODY MASS INDEX: 20.03 KG/M2 | HEART RATE: 69 BPM | OXYGEN SATURATION: 96 % | WEIGHT: 102 LBS | RESPIRATION RATE: 16 BRPM | TEMPERATURE: 97 F

## 2019-10-23 DIAGNOSIS — N39.0 URINARY TRACT INFECTION WITHOUT HEMATURIA, SITE UNSPECIFIED: Primary | ICD-10-CM
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
## 2019-10-23 DIAGNOSIS — I10 ESSENTIAL HYPERTENSION, BENIGN: ICD-10-CM

## 2019-10-23 DIAGNOSIS — R62.7 FAILURE TO THRIVE IN ADULT: Primary | ICD-10-CM

## 2019-10-23 DIAGNOSIS — G20 PARKINSON'S DISEASE (HCC): ICD-10-CM

## 2019-10-23 RX ORDER — ATORVASTATIN CALCIUM 40 MG/1
40 TABLET, FILM COATED ORAL DAILY
Qty: 90 TAB | Refills: 3 | Status: SHIPPED | OUTPATIENT
Start: 2019-10-23 | End: 2020-11-16 | Stop reason: SDUPTHER

## 2019-10-23 NOTE — PROGRESS NOTES
Chief Complaint   Patient presents with   [facility] Follow Up       Pt preferred language for health care discussion is english. Is someone accompanying this pt? no    Is the patient using any DME equipment during OV? no    Depression Screening:  No flowsheet data found. Learning Assessment:  Learning Assessment 1/3/2018   PRIMARY LEARNER Corine   HIGHEST LEVEL OF EDUCATION - PRIMARY LEARNER  GRADUATED HIGH SCHOOL OR GED   BARRIERS PRIMARY LEARNER NONE   PRIMARY LANGUAGE ENGLISH    NEED No   LEARNER PREFERENCE PRIMARY DEMONSTRATION     READING   ANSWERED BY corine   RELATIONSHIP OTHER       Abuse Screening:  Abuse Screening Questionnaire 4/2/2018   Do you ever feel afraid of your partner? N   Are you in a relationship with someone who physically or mentally threatens you? N   Is it safe for you to go home? Y       Fall Risk  Fall Risk Assessment, last 12 mths 1/3/2018   Able to walk? Yes   Fall in past 12 months? No           Advance Directive:  1. Do you have an advance directive in place? Patient Reply:no    2. If not, would you like material regarding how to put one in place? Patient Reply: . Coordination of Care:  1. Have you been to the ER, urgent care clinic since your last visit? Hospitalized since your last visit? Yes  [facility]    2. Have you seen or consulted any other health care providers outside of the [address] [place] [address] since your last visit? Include any pap smears or colon screening.  no    Provider prefers to do his own med reconciliation

## 2019-10-23 NOTE — PATIENT INSTRUCTIONS

## 2020-03-13 RX ORDER — LISINOPRIL 10 MG/1
TABLET ORAL
Qty: 90 TAB | Refills: 3 | Status: SHIPPED | OUTPATIENT
Start: 2020-03-13 | End: 2021-03-24

## 2020-05-20 ENCOUNTER — TELEPHONE (OUTPATIENT)
Dept: FAMILY MEDICINE CLINIC | Age: 70
End: 2020-05-20

## 2020-05-20 NOTE — TELEPHONE ENCOUNTER
Home care delivered called to check the status of cert for medical necessity that was faxed over on 5/14/20

## 2020-06-22 NOTE — PROGRESS NOTES
This is the Subsequent Medicare Annual Wellness Exam, performed 12 months or more after the Initial AWV or the last Subsequent AWV    Consent: Louann Aguiar, who was seen by synchronous (real-time) audio only technology, and/or her healthcare decision maker, is aware that this patient-initiated, Telehealth encounter on 6/23/2020 is a billable service. While AWVs are fully covered by Medicare, any services rendered on this date that are not included in an AWV are subject to additional billing, with coverage as determined by her insurance carrier. She is aware that she may receive a bill for any such additional services and has provided verbal consent to proceed: Yes. I have reviewed the patient's medical history in detail and updated the computerized patient record. History     Patient Active Problem List   Diagnosis Code    Bradycardia R00.1    Parkinson's disease ([facility] [address] G20    Essential hypertension, benign I10    Depression F32.9    Mixed hyperlipidemia E78.2     Past Medical History:   Diagnosis Date    CVA (cerebral vascular accident) ([facility] [address]     distant past    Depression     with atypical psychosis    Hyperlipidemia     Hypertension     Parkinson disease ([facility] [address]     with dementia      Past Surgical History:   Procedure Laterality Date    HX HYSTERECTOMY       Current Outpatient Medications   Medication Sig Dispense Refill    lisinopriL (PRINIVIL, ZESTRIL) 10 mg tablet take 1 tablet by mouth once daily 90 Tab 3    atorvastatin (LIPITOR) 40 mg tablet Take 1 Tab by mouth daily. 90 Tab 3    fluvoxaMINE (LUVOX) 50 mg tablet Take  by mouth nightly.  aspirin 81 mg chewable tablet Take 81 mg by mouth daily.  risperidone (RISPERDAL) 2 mg tablet Take 2 mg by mouth two (2) times a day.        Allergies   Allergen Reactions    Ativan [Lorazepam] Other (comments)     Insomnia, visual hallucinations       Family History   Problem Relation Age of Onset    Hypertension Mother     Hypertension Father      Social History     Tobacco Use    Smoking status: Never Smoker    Smokeless tobacco: Never Used   Substance Use Topics    Alcohol use: No       Depression Risk Factor Screening:     3 most recent PHQ Screens 6/23/2020   PHQ Not Done Active Diagnosis of Depression or Bipolar Disorder       Alcohol Risk Factor Screening:   Do you average 1 drink per night or more than 7 drinks a week:  No    On any one occasion in the past three months have you have had more than 3 drinks containing alcohol:  No      Functional Ability and Level of Safety:   Hearing: Hearing is good. Activities of Daily Living: The home contains: no safety equipment. Patient needs help with:  transportation, preparing meals, managing medications, dressing, bathing and hygiene     Ambulation: uses wheelchair     Fall Risk:  Fall Risk Assessment, last 12 mths 6/23/2020   Able to walk? Yes   Fall in past 12 months? No   Number of falls in past 12 months -     Abuse Screen:  Patient is not abused       Cognitive Screening   Has your family/caregiver stated any concerns about your memory: yes    Cognitive Screening: Abnormal - known dementia    Patient Care Team   Patient Care Team:  Kemi Oliveira MD as PCP - General (Internal Medicine)  Kemi Oliveira MD as PCP - [facility] Empaneled Provider    Assessment/Plan   Education and counseling provided:  Are appropriate based on today's review and evaluation    Diagnoses and all orders for this visit:    1. Routine general medical examination at a health care facility    2. Essential hypertension, benign    3. Mixed hyperlipidemia    4.  Parkinson's disease [facility])        Health Maintenance Due   Topic Date Due    Hepatitis C Screening  1950    DTaP/Tdap/Td series (1 - Tdap) 06/09/1971    Shingrix Vaccine Age 50> (1 of 2) 06/09/2000    FOBT Q1Y Age 50-75  06/09/2000    Breast Cancer Screen Mammogram  06/06/2014    GLAUCOMA SCREENING Q2Y  06/09/2015    Bone Densitometry (Dexa) Screening  06/09/2015    Pneumococcal 65+ years (1 of 1 - PPSV23) 06/09/2015    Lipid Screen  07/06/2019    Medicare Yearly Exam  01/09/2020   Medicare wellness performed  Labs reviewed from 10/2019 Sandra Teixeira)  Advance directive discussed          Bailee Enciso is a 79 y.o. female who was evaluated by an audio only encounter for concerns as above. Patient identification was verified prior to start of the visit. A caregiver was present when appropriate. Due to this being a TeleHealth encounter (During KTOOO-34 public health emergency), evaluation of the following organ systems was limited: Vitals/Constitutional/EENT/Resp/CV/GI//MS/Neuro/Skin/Heme-Lymph-Imm. Pursuant to the emergency declaration under the [facility] waiver authority and the [employer] and [employer] Act, this Virtual Visit was conducted, with patient's (and/or legal guardian's) consent, to reduce the patient's risk of exposure to COVID-19 and provide necessary medical care. Services were provided through a synchronous discussion virtually to substitute for in-person clinic visit. I was at home. The patient was at home. Rachid Lance MD     Bailee Enciso is a 79 y.o. female evaluated via audio only technology on 6/23/2020. Consent: She and/or her health care decision maker is aware that she may receive a bill for this audio only encounter, depending on her insurance coverage, and has provided verbal consent to proceed: Yes    I communicated with the patient and/or health care decision maker about the nature and details of the following:  Assessment & Plan:   Diagnoses and all orders for this visit:    1. Routine general medical examination at a health care facility    2. Essential hypertension, benign    3. Mixed hyperlipidemia    4.  Parkinson's disease ([facility] [address]      Urinary incontinence d/t dementia/Parkinson disease  HTN/hyperlipidemia - continue current rx  OV 6 mos or prn  I have explained plan to family and daughter verbalizes understanding    12  Subjective: Talisha Dixon is a 79 y.o. female who was seen for Annual Wellness Visit  F/u visit for routine evaluation of HTN, hyperlipidemia  Overall status is limited by Parkinson disease with dementia  Needs incontinence pads/diapers (chronic urinary incontinence)  Currently w/o chest/abdominal pain, dyspnea, cough, fever, evidence of GI/ hemorrhage        Prior to Admission medications    Medication Sig Start Date End Date Taking? Authorizing Provider   lisinopriL (PRINIVIL, ZESTRIL) 10 mg tablet take 1 tablet by mouth once daily 3/13/20   Jackie Aguilar MD   atorvastatin (LIPITOR) 40 mg tablet Take 1 Tab by mouth daily. 10/23/19   Jackie Aguilar MD   fluvoxaMINE (LUVOX) 50 mg tablet Take  by mouth nightly. Provider, Historical   aspirin 81 mg chewable tablet Take 81 mg by mouth daily. Provider, Historical   risperidone (RISPERDAL) 2 mg tablet Take 2 mg by mouth two (2) times a day. Other, MD Yarelis     Allergies   Allergen Reactions    Ativan [Lorazepam] Other (comments)     Insomnia, visual hallucinations       Current Outpatient Medications   Medication Sig Dispense Refill    lisinopriL (PRINIVIL, ZESTRIL) 10 mg tablet take 1 tablet by mouth once daily 90 Tab 3    atorvastatin (LIPITOR) 40 mg tablet Take 1 Tab by mouth daily. 90 Tab 3    fluvoxaMINE (LUVOX) 50 mg tablet Take  by mouth nightly.  aspirin 81 mg chewable tablet Take 81 mg by mouth daily.  risperidone (RISPERDAL) 2 mg tablet Take 2 mg by mouth two (2) times a day.        Allergies   Allergen Reactions    Ativan [Lorazepam] Other (comments)     Insomnia, visual hallucinations     Past Medical History:   Diagnosis Date    CVA (cerebral vascular accident) ([facility] [address]     distant past    Depression     with atypical psychosis    Hyperlipidemia     Hypertension     Parkinson disease ([facility] [address]     with dementia     Past Surgical History:   Procedure Laterality Date    HX HYSTERECTOMY         ROS per HPI    I affirm this is a Patient-Initiated Episode with a Patient who has not had a related appointment within my department in the past 7 days or scheduled within the next 24 hours.     Total Time: minutes: 11-20 minutes    Note: not billable if this call serves to triage the patient into an appointment for the relevant concern      Doug Thomas MD

## 2020-06-23 ENCOUNTER — VIRTUAL VISIT (OUTPATIENT)
Dept: FAMILY MEDICINE CLINIC | Age: 70
End: 2020-06-23

## 2020-06-23 DIAGNOSIS — G20 PARKINSON'S DISEASE (HCC): ICD-10-CM

## 2020-06-23 DIAGNOSIS — I10 ESSENTIAL HYPERTENSION, BENIGN: ICD-10-CM

## 2020-06-23 DIAGNOSIS — Z00.00 ROUTINE GENERAL MEDICAL EXAMINATION AT A HEALTH CARE FACILITY: Primary | ICD-10-CM

## 2020-06-23 DIAGNOSIS — E78.2 MIXED HYPERLIPIDEMIA: ICD-10-CM

## 2020-06-23 NOTE — PATIENT INSTRUCTIONS
Medicare Wellness Visit, Female The best way to live healthy is to have a lifestyle where you eat a well-balanced diet, exercise regularly, limit alcohol use, and quit all forms of tobacco/nicotine, if applicable. Regular preventive services are another way to keep healthy. Preventive services (vaccines, screening tests, monitoring & exams) can help personalize your care plan, which helps you manage your own care. Screening tests can find health problems at the earliest stages, when they are easiest to treat. [facility] follows the current, evidence-based guidelines published by the [facility] Cesario Hernandez ([facility]) when recommending preventive services for our patients. Because we follow these guidelines, sometimes recommendations change over time as research supports it. (For example, mammograms used to be recommended annually. Even though Medicare will still pay for an annual mammogram, the newer guidelines recommend a mammogram every two years for women of average risk). Of course, you and your doctor may decide to screen more often for some diseases, based on your risk and your co-morbidities (chronic disease you are already diagnosed with). Preventive services for you include: - Medicare offers their members a free annual wellness visit, which is time for you and your primary care provider to discuss and plan for your preventive service needs. Take advantage of this benefit every year! 
-All adults over the age of 72 should receive the recommended pneumonia vaccines. Current USPSTF guidelines recommend a series of two vaccines for the best pneumonia protection.  
-All adults should have a flu vaccine yearly and a tetanus vaccine every 10 years.  
-All adults age 48 and older should receive the shingles vaccines (series of two vaccines). -All adults age 38-68 who are overweight should have a diabetes screening test once every three years. -All adults born between [birth year] and 1965 should be screened once for Hepatitis C. 
-Other screening tests and preventive services for persons with diabetes include: an eye exam to screen for diabetic retinopathy, a kidney function test, a foot exam, and stricter control over your cholesterol.  
-Cardiovascular screening for adults with routine risk involves an electrocardiogram (ECG) at intervals determined by your doctor.  
-Colorectal cancer screenings should be done for adults age 54-65 with no increased risk factors for colorectal cancer. There are a number of acceptable methods of screening for this type of cancer. Each test has its own benefits and drawbacks. Discuss with your doctor what is most appropriate for you during your annual wellness visit. The different tests include: colonoscopy (considered the best screening method), a fecal occult blood test, a fecal DNA test, and sigmoidoscopy. 
 
-A bone mass density test is recommended when a woman turns 65 to screen for osteoporosis. This test is only recommended one time, as a screening. Some providers will use this same test as a disease monitoring tool if you already have osteoporosis. -Breast cancer screenings are recommended every other year for women of normal risk, age 54-69. 
-Cervical cancer screenings for women over age 72 are only recommended with certain risk factors. Here is a list of your current Health Maintenance items (your personalized list of preventive services) with a due date: 
Health Maintenance Due Topic Date Due  
 Hepatitis C Test  1950  DTaP/Tdap/Td  (1 - Tdap) 06/09/1971  Shingles Vaccine (1 of 2) 06/09/2000  Colon Cancer Stool Test  06/09/2000  Mammogram  06/06/2014  Glaucoma Screening   06/09/2015  Bone Mineral Density   06/09/2015  Pneumococcal Vaccine (1 of 1 - PPSV23) 06/09/2015  Cholesterol Test   07/06/2019 [facility] Annual Well Visit  01/09/2020

## 2020-11-16 RX ORDER — ATORVASTATIN CALCIUM 40 MG/1
40 TABLET, FILM COATED ORAL DAILY
Qty: 90 TAB | Refills: 1 | Status: SHIPPED | OUTPATIENT
Start: 2020-11-16 | End: 2021-05-19

## 2020-11-16 NOTE — TELEPHONE ENCOUNTER
Last Visit: 6/23/20 with MD Kimberley Acosta  Next Appointment: Advised to follow-up in 6 months  Previous Refill Encounter(s): 10/23/19 #90 with 3 refills    Requested Prescriptions     Pending Prescriptions Disp Refills    atorvastatin (LIPITOR) 40 mg tablet 90 Tab 3     Sig: Take 1 Tab by mouth daily.

## 2020-11-20 ENCOUNTER — HOSPITAL ENCOUNTER (OUTPATIENT)
Dept: LAB | Age: 70
Discharge: HOME | End: 2020-11-20

## 2020-11-20 ENCOUNTER — TRANSCRIBE ORDER (OUTPATIENT)
Dept: REGISTRATION | Age: 70
End: 2020-11-20

## 2020-11-20 DIAGNOSIS — Z51.81 ENCOUNTER FOR THERAPEUTIC DRUG MONITORING: ICD-10-CM

## 2020-11-20 DIAGNOSIS — Z51.81 ENCOUNTER FOR THERAPEUTIC DRUG MONITORING: Primary | ICD-10-CM

## 2020-11-20 LAB — XX-LABCORP SPECIMEN COL,LCBCF: NORMAL

## 2020-11-20 PROCEDURE — 99001 SPECIMEN HANDLING PT-LAB: CPT

## 2020-11-21 LAB
ALBUMIN SERPL-MCNC: 4.3 G/DL (ref 3.8–4.8)
ALBUMIN/GLOB SERPL: 1.7 {RATIO} (ref 1.2–2.2)
ALP SERPL-CCNC: 73 IU/L
ALT SERPL-CCNC: 15 IU/L
APPEARANCE UR: CLEAR
AST SERPL-CCNC: 21 IU/L (ref 0–40)
BACTERIA #/AREA URNS HPF: ABNORMAL /[HPF]
BASOPHILS # BLD AUTO: 0 X10E3/UL
BASOPHILS NFR BLD AUTO: 1 %
BILIRUB DIRECT SERPL-MCNC: 0.21 MG/DL (ref 0–0.4)
BILIRUB SERPL-MCNC: 0.7 MG/DL (ref 0–1.2)
BILIRUB UR QL STRIP: NEGATIVE
BUN SERPL-MCNC: 10 MG/DL
BUN/CREAT SERPL: 13
CALCIUM SERPL-MCNC: 9.5 MG/DL
CASTS URNS QL MICRO: ABNORMAL /LPF
CHLORIDE SERPL-SCNC: 105 MMOL/L (ref 96–106)
CHOLEST SERPL-MCNC: 153 MG/DL
CO2 SERPL-SCNC: 26 MMOL/L (ref 20–29)
COLOR UR: YELLOW
CREAT SERPL-MCNC: 0.78 MG/DL
EOSINOPHIL # BLD AUTO: 0.1 X10E3/UL
EOSINOPHIL NFR BLD AUTO: 3 %
EPI CELLS #/AREA URNS HPF: ABNORMAL /HPF (ref 0–10)
ERYTHROCYTE [DISTWIDTH] IN BLOOD BY AUTOMATED COUNT: 12.5 %
GLOBULIN SER CALC-MCNC: 2.6 G/DL (ref 1.5–4.5)
GLUCOSE SERPL-MCNC: 89 MG/DL (ref 65–99)
GLUCOSE UR QL: NEGATIVE
HCT VFR BLD AUTO: 40.2 %
HDLC SERPL-MCNC: 92 MG/DL
HGB BLD-MCNC: 13.7 G/DL
HGB UR QL STRIP: NEGATIVE
IMM GRANULOCYTES # BLD AUTO: 0 X10E3/UL
IMM GRANULOCYTES NFR BLD AUTO: 0 %
KETONES UR QL STRIP: NEGATIVE
LDLC SERPL CALC-MCNC: 51 MG/DL
LEUKOCYTE ESTERASE UR QL STRIP: ABNORMAL
LYMPHOCYTES # BLD AUTO: 1.3 X10E3/UL
LYMPHOCYTES NFR BLD AUTO: 57 %
MCH RBC QN AUTO: 29.8 PG
MCHC RBC AUTO-ENTMCNC: 34.1 G/DL
MCV RBC AUTO: 87 FL
MICRO URNS: ABNORMAL
MONOCYTES # BLD AUTO: 0.3 X10E3/UL
MONOCYTES NFR BLD AUTO: 12 %
MUCOUS THREADS URNS QL MICRO: PRESENT
NEUTROPHILS # BLD AUTO: 0.6 X10E3/UL
NEUTROPHILS NFR BLD AUTO: 27 %
NITRITE UR QL STRIP: POSITIVE
PH UR STRIP: 6 [PH] (ref 5–7.5)
PLATELET # BLD AUTO: 172 X10E3/UL (ref 150–450)
POTASSIUM SERPL-SCNC: 3.7 MMOL/L (ref 3.5–5.2)
PROT SERPL-MCNC: 6.9 G/DL (ref 6–8.5)
PROT UR QL STRIP: NEGATIVE
RBC # BLD AUTO: 4.6 X10E6/UL
RBC #/AREA URNS HPF: ABNORMAL /HPF (ref 0–2)
SODIUM SERPL-SCNC: 145 MMOL/L (ref 134–144)
SP GR UR: 1.02 (ref 1–1.03)
SPECIMEN STATUS REPORT, ROLRST: NORMAL
T3 SERPL-MCNC: 98 NG/DL (ref 71–180)
T4 SERPL-MCNC: 10.7 UG/DL (ref 4.5–12)
TRIGL SERPL-MCNC: 45 MG/DL (ref ?–150)
TSH SERPL DL<=0.005 MIU/L-ACNC: 1.2 UIU/ML (ref 0.45–4.5)
UROBILINOGEN UR STRIP-MCNC: 1 MG/DL (ref 0.2–1)
VLDLC SERPL CALC-MCNC: 10 MG/DL (ref 5–40)
WBC # BLD AUTO: 2.3 X10E3/UL (ref 3.4–10.8)
WBC #/AREA URNS HPF: ABNORMAL /HPF (ref 0–5)

## 2020-11-23 ENCOUNTER — TELEPHONE (OUTPATIENT)
Dept: FAMILY MEDICINE CLINIC | Age: 70
End: 2020-11-23

## 2020-11-23 NOTE — TELEPHONE ENCOUNTER
Although labs are in my name, pt's psychiatrist ordered labs dated 11/20/2020  I discussed this morning low WBC with low ANC with daughter (caretaker) and need to consider stopping risperdal  She will discuss with prescribing psychiatrist

## 2021-01-14 ENCOUNTER — TELEPHONE (OUTPATIENT)
Dept: FAMILY MEDICINE CLINIC | Age: 71
End: 2021-01-14

## 2021-02-22 NOTE — PROGRESS NOTES
Tyrel Vazquez is a 79 y.o. female, evaluated via audio-only technology on 2/23/2021 for Dementia  . Assessment & Plan:   Diagnoses and all orders for this visit:    1. Dementia due to Parkinson's disease without behavioral disturbance ([facility] [address]    2. Essential hypertension, benign    3. Mixed hyperlipidemia    Dementia in setting of advanced Parkinson's - daughter helps with ADLs  Prognosis guarded; discussed need for advanced directive  HTN/hyperlipidemia - continue dietary efforts  No change in rx  OV 6 mos or prn  I have explained plan to /daughterpatient and they verbalize understanding      12  Subjective:   F/u visit for routine evaluation of HTN, hyperlipidemia  Neuro and psych manage Parkinson's with dementia and depression  WBC/ANC low 11/2020 - advised her to speak with prescribing MD and consider stopping risperdal (prescribing MD decided to continue rx)  Overall functional status poor  Currently w/o chest/abdominal pain, dyspnea, cough, fever, evidence of GI/ hemorrhage        Prior to Admission medications    Medication Sig Start Date End Date Taking? Authorizing Provider   atorvastatin (LIPITOR) 40 mg tablet Take 1 Tab by mouth daily. 11/16/20   Jef Elizalde MD   lisinopriL (PRINIVIL, ZESTRIL) 10 mg tablet take 1 tablet by mouth once daily 3/13/20   Jef Elizalde MD   fluvoxaMINE (LUVOX) 50 mg tablet Take  by mouth nightly. Provider, Historical   aspirin 81 mg chewable tablet Take 81 mg by mouth daily. Provider, Historical   risperidone (RISPERDAL) 2 mg tablet Take 2 mg by mouth two (2) times a day. Other, MD Yarelis     Current Outpatient Medications   Medication Sig Dispense Refill    atorvastatin (LIPITOR) 40 mg tablet Take 1 Tab by mouth daily. 90 Tab 1    lisinopriL (PRINIVIL, ZESTRIL) 10 mg tablet take 1 tablet by mouth once daily 90 Tab 3    fluvoxaMINE (LUVOX) 50 mg tablet Take  by mouth nightly.  aspirin 81 mg chewable tablet Take 81 mg by mouth daily.       risperidone (RISPERDAL) 2 mg tablet Take 2 mg by mouth two (2) times a day. Allergies   Allergen Reactions    Ativan [Lorazepam] Other (comments)     Insomnia, visual hallucinations     Past Medical History:   Diagnosis Date    CVA (cerebral vascular accident) ([facility] [address]     distant past    Depression     with atypical psychosis    Hyperlipidemia     Hypertension     Parkinson disease ([facility] [address]     with dementia     Past Surgical History:   Procedure Laterality Date    HX HYSTERECTOMY         ROS per HPI    No flowsheet data found. 20 min visit    Sarmad Herrera, who was evaluated through a patient-initiated, synchronous (real-time) audio only encounter, and/or her healthcare decision maker, is aware that it is a billable service, with coverage as determined by her insurance carrier. She provided verbal consent to proceed: Yes. She has not had a related appointment within my department in the past 7 days or scheduled within the next 24 hours.       Total Time: 20 min    Denny Shaikh MD

## 2021-02-23 ENCOUNTER — VIRTUAL VISIT (OUTPATIENT)
Dept: FAMILY MEDICINE CLINIC | Age: 71
End: 2021-02-23
Payer: MEDICARE

## 2021-02-23 DIAGNOSIS — F02.80 DEMENTIA DUE TO PARKINSON'S DISEASE WITHOUT BEHAVIORAL DISTURBANCE (HCC): Primary | ICD-10-CM

## 2021-02-23 DIAGNOSIS — I10 ESSENTIAL HYPERTENSION, BENIGN: ICD-10-CM

## 2021-02-23 DIAGNOSIS — E78.2 MIXED HYPERLIPIDEMIA: ICD-10-CM

## 2021-02-23 DIAGNOSIS — G20 DEMENTIA DUE TO PARKINSON'S DISEASE WITHOUT BEHAVIORAL DISTURBANCE (HCC): Primary | ICD-10-CM

## 2021-02-23 PROCEDURE — 99442 PR PHYS/QHP TELEPHONE EVALUATION 11-20 MIN: CPT | Performed by: INTERNAL MEDICINE

## 2021-03-24 RX ORDER — LISINOPRIL 10 MG/1
TABLET ORAL
Qty: 90 TAB | Refills: 3 | Status: SHIPPED | OUTPATIENT
Start: 2021-03-24 | End: 2021-11-23 | Stop reason: SDUPTHER

## 2021-08-24 NOTE — TELEPHONE ENCOUNTER
Last Visit: 2/23/21 with MD Rehana Salcido  Next Appointment: 11/19/21 with CHIQUI Contreras  Previous Refill Encounter(s): 5/19/21 #90    Requested Prescriptions     Pending Prescriptions Disp Refills    atorvastatin (LIPITOR) 40 mg tablet 90 Tablet 0     Sig: Take 1 Tablet by mouth daily.

## 2021-08-24 NOTE — TELEPHONE ENCOUNTER
Requested Prescriptions     Pending Prescriptions Disp Refills    atorvastatin (LIPITOR) 40 mg tablet 90 Tablet 0     Sig: Take 1 Tablet by mouth daily. pt is currently out her medication.

## 2021-08-25 NOTE — TELEPHONE ENCOUNTER
Patient's daughter came in office requesting f/u on med refill. Says she is down to her last 2 pills. Informed her of the 72 hour turn around time.  Requesting if it can be written in paper form and picked up

## 2021-08-26 RX ORDER — ATORVASTATIN CALCIUM 40 MG/1
40 TABLET, FILM COATED ORAL DAILY
Qty: 90 TABLET | Refills: 0 | Status: SHIPPED | OUTPATIENT
Start: 2021-08-26 | End: 2021-09-02 | Stop reason: SDUPTHER

## 2021-08-26 RX ORDER — ATORVASTATIN CALCIUM 40 MG/1
TABLET, FILM COATED ORAL
Qty: 90 TABLET | Refills: 0 | OUTPATIENT
Start: 2021-08-26

## 2021-08-26 NOTE — TELEPHONE ENCOUNTER
Patient's daughter called again for this refill. Stated this is her 3rd request and she needs this done.

## 2021-08-27 NOTE — TELEPHONE ENCOUNTER
Daughter called to check status of refill for atorvastatin, shows was approved on 8/24 but pharmacy says does not have, duplicate request was refused.  Please adv [phone]

## 2021-08-30 NOTE — TELEPHONE ENCOUNTER
Patient's daughter doing f/u call about medication (lipitor).  Was told by the pharmacy a request was denied due to it being a duplicate and now needs the original request sent to pharmacy

## 2021-09-02 DIAGNOSIS — E78.2 MIXED HYPERLIPIDEMIA: Primary | ICD-10-CM

## 2021-09-02 RX ORDER — ATORVASTATIN CALCIUM 40 MG/1
40 TABLET, FILM COATED ORAL DAILY
Qty: 90 TABLET | Refills: 0 | Status: SHIPPED | OUTPATIENT
Start: 2021-09-02 | End: 2021-11-23 | Stop reason: SDUPTHER

## 2021-11-22 DIAGNOSIS — E78.2 MIXED HYPERLIPIDEMIA: ICD-10-CM

## 2021-11-23 ENCOUNTER — OFFICE VISIT (OUTPATIENT)
Dept: FAMILY MEDICINE CLINIC | Age: 71
End: 2021-11-23
Payer: MEDICARE

## 2021-11-23 VITALS
DIASTOLIC BLOOD PRESSURE: 68 MMHG | BODY MASS INDEX: 18.06 KG/M2 | TEMPERATURE: 96.8 F | HEART RATE: 60 BPM | HEIGHT: 60 IN | WEIGHT: 92 LBS | RESPIRATION RATE: 16 BRPM | OXYGEN SATURATION: 100 % | SYSTOLIC BLOOD PRESSURE: 130 MMHG

## 2021-11-23 DIAGNOSIS — Z23 ENCOUNTER FOR IMMUNIZATION: ICD-10-CM

## 2021-11-23 DIAGNOSIS — E78.2 MIXED HYPERLIPIDEMIA: ICD-10-CM

## 2021-11-23 DIAGNOSIS — Z11.59 ENCOUNTER FOR HEPATITIS C SCREENING TEST FOR LOW RISK PATIENT: ICD-10-CM

## 2021-11-23 DIAGNOSIS — Z13.31 SCREENING FOR DEPRESSION: ICD-10-CM

## 2021-11-23 DIAGNOSIS — I10 ESSENTIAL HYPERTENSION, BENIGN: ICD-10-CM

## 2021-11-23 DIAGNOSIS — Z00.00 MEDICARE ANNUAL WELLNESS VISIT, SUBSEQUENT: Primary | ICD-10-CM

## 2021-11-23 PROCEDURE — G8427 DOCREV CUR MEDS BY ELIG CLIN: HCPCS | Performed by: NURSE PRACTITIONER

## 2021-11-23 PROCEDURE — G8752 SYS BP LESS 140: HCPCS | Performed by: NURSE PRACTITIONER

## 2021-11-23 PROCEDURE — G8400 PT W/DXA NO RESULTS DOC: HCPCS | Performed by: NURSE PRACTITIONER

## 2021-11-23 PROCEDURE — 1090F PRES/ABSN URINE INCON ASSESS: CPT | Performed by: NURSE PRACTITIONER

## 2021-11-23 PROCEDURE — G0439 PPPS, SUBSEQ VISIT: HCPCS | Performed by: NURSE PRACTITIONER

## 2021-11-23 PROCEDURE — G0444 DEPRESSION SCREEN ANNUAL: HCPCS | Performed by: NURSE PRACTITIONER

## 2021-11-23 PROCEDURE — G8419 CALC BMI OUT NRM PARAM NOF/U: HCPCS | Performed by: NURSE PRACTITIONER

## 2021-11-23 PROCEDURE — 1101F PT FALLS ASSESS-DOCD LE1/YR: CPT | Performed by: NURSE PRACTITIONER

## 2021-11-23 PROCEDURE — G9717 DOC PT DX DEP/BP F/U NT REQ: HCPCS | Performed by: NURSE PRACTITIONER

## 2021-11-23 PROCEDURE — G0009 ADMIN PNEUMOCOCCAL VACCINE: HCPCS | Performed by: NURSE PRACTITIONER

## 2021-11-23 PROCEDURE — 90694 VACC AIIV4 NO PRSRV 0.5ML IM: CPT | Performed by: NURSE PRACTITIONER

## 2021-11-23 PROCEDURE — G8536 NO DOC ELDER MAL SCRN: HCPCS | Performed by: NURSE PRACTITIONER

## 2021-11-23 PROCEDURE — 90732 PPSV23 VACC 2 YRS+ SUBQ/IM: CPT | Performed by: NURSE PRACTITIONER

## 2021-11-23 PROCEDURE — 99214 OFFICE O/P EST MOD 30 MIN: CPT | Performed by: NURSE PRACTITIONER

## 2021-11-23 PROCEDURE — G0008 ADMIN INFLUENZA VIRUS VAC: HCPCS | Performed by: NURSE PRACTITIONER

## 2021-11-23 PROCEDURE — G8754 DIAS BP LESS 90: HCPCS | Performed by: NURSE PRACTITIONER

## 2021-11-23 PROCEDURE — 3017F COLORECTAL CA SCREEN DOC REV: CPT | Performed by: NURSE PRACTITIONER

## 2021-11-23 RX ORDER — LISINOPRIL 10 MG/1
10 TABLET ORAL DAILY
Qty: 90 TABLET | Refills: 3 | Status: SHIPPED | OUTPATIENT
Start: 2021-11-23 | End: 2022-10-31

## 2021-11-23 RX ORDER — ZOSTER VACCINE RECOMBINANT, ADJUVANTED 50 MCG/0.5
KIT INTRAMUSCULAR
Qty: 0.5 ML | Refills: 1 | Status: SHIPPED | OUTPATIENT
Start: 2021-11-23

## 2021-11-23 RX ORDER — ATORVASTATIN CALCIUM 40 MG/1
40 TABLET, FILM COATED ORAL DAILY
Qty: 90 TABLET | Refills: 3 | Status: SHIPPED | OUTPATIENT
Start: 2021-11-23 | End: 2022-10-26

## 2021-11-23 RX ORDER — ATORVASTATIN CALCIUM 40 MG/1
TABLET, FILM COATED ORAL
Qty: 90 TABLET | Refills: 0 | OUTPATIENT
Start: 2021-11-23

## 2021-11-23 NOTE — PROGRESS NOTES
Hypertension    Subjective: Christen Negro is a 70 y.o. DECLINED female who presents for follow-up of hypertension. Pt denies any SOB, edema, CP, orthopnea, palpitations, nocturnal dyspnea, headaches, or blurred vision. Diet and Lifestyle: generally follows a low sodium diet  Home BP Monitoring: is not measured at home    Cardiovascular ROS: taking medications as instructed, no medication side effects noted, no TIA's, no chest pain on exertion, no dyspnea on exertion, no swelling of ankles. New concerns: Would like DMV handicap window tag. Key Antihyperlipidemia Meds             atorvastatin (LIPITOR) 40 mg tablet (Taking) Take 1 Tablet by mouth daily. Current Outpatient Medications   Medication Sig Dispense Refill    varicella-zoster recombinant, PF, (Shingrix, PF,) 50 mcg/0.5 mL susr injection 0.5mL by IntraMUSCular route once now and then repeat in 2-6 months 0.5 mL 1    atorvastatin (LIPITOR) 40 mg tablet Take 1 Tablet by mouth daily. 90 Tablet 3    lisinopriL (PRINIVIL, ZESTRIL) 10 mg tablet Take 1 Tablet by mouth daily. 90 Tablet 3    fluvoxaMINE (LUVOX) 50 mg tablet Take  by mouth nightly.  aspirin 81 mg chewable tablet Take 81 mg by mouth daily.  risperidone (RISPERDAL) 2 mg tablet Take 2 mg by mouth two (2) times a day. Patient Active Problem List   Diagnosis Code    Bradycardia R00.1    Parkinson's disease ([facility] [address] G20    Essential hypertension, benign I10    Depression F32. A    Mixed hyperlipidemia E78.2     Current Outpatient Medications   Medication Sig Dispense Refill    varicella-zoster recombinant, PF, (Shingrix, PF,) 50 mcg/0.5 mL susr injection 0.5mL by IntraMUSCular route once now and then repeat in 2-6 months 0.5 mL 1    atorvastatin (LIPITOR) 40 mg tablet Take 1 Tablet by mouth daily. 90 Tablet 3    lisinopriL (PRINIVIL, ZESTRIL) 10 mg tablet Take 1 Tablet by mouth daily.  90 Tablet 3    fluvoxaMINE (LUVOX) 50 mg tablet Take  by mouth nightly.  aspirin 81 mg chewable tablet Take 81 mg by mouth daily.  risperidone (RISPERDAL) 2 mg tablet Take 2 mg by mouth two (2) times a day. Allergies   Allergen Reactions    Ativan [Lorazepam] Other (comments)     Insomnia, visual hallucinations        Lab Results   Component Value Date/Time    Cholesterol, total 153 11/20/2020 12:00 AM    HDL Cholesterol 92 11/20/2020 12:00 AM    LDL, calculated 51 11/20/2020 12:00 AM    LDL, calculated 107 (H) 07/06/2018 10:44 AM    Triglyceride 45 11/20/2020 12:00 AM    CHOL/HDL Ratio 2.1 07/06/2018 10:44 AM     Lab Results   Component Value Date/Time    Sodium 145 (H) 11/20/2020 12:00 AM    Potassium 3.7 11/20/2020 12:00 AM    Chloride 105 11/20/2020 12:00 AM    CO2 26 11/20/2020 12:00 AM    Anion gap 8 07/06/2018 10:44 AM    Glucose 89 11/20/2020 12:00 AM    BUN 10 11/20/2020 12:00 AM    Creatinine 0.78 11/20/2020 12:00 AM    BUN/Creatinine ratio 13 11/20/2020 12:00 AM    GFR est AA CANCELED 11/20/2020 12:00 AM    GFR est non-AA CANCELED 11/20/2020 12:00 AM    Calcium 9.5 11/20/2020 12:00 AM         Review of Systems, additional:  Pertinent items are noted in HPI.     Objective:     Visit Vitals  /68   Pulse 60   Temp 96.8 °F (36 °C) (Temporal)   Resp 16   Ht 5' (1.524 m)   Wt 92 lb (41.7 kg)   SpO2 100%   BMI 17.97 kg/m²       Lab Results   Component Value Date/Time    Sodium 145 (H) 11/20/2020 12:00 AM    Potassium 3.7 11/20/2020 12:00 AM    Chloride 105 11/20/2020 12:00 AM    CO2 26 11/20/2020 12:00 AM    Anion gap 8 07/06/2018 10:44 AM    Glucose 89 11/20/2020 12:00 AM    BUN 10 11/20/2020 12:00 AM    Creatinine 0.78 11/20/2020 12:00 AM    BUN/Creatinine ratio 13 11/20/2020 12:00 AM    GFR est AA CANCELED 11/20/2020 12:00 AM    GFR est non-AA CANCELED 11/20/2020 12:00 AM    Calcium 9.5 11/20/2020 12:00 AM     Lab Results   Component Value Date/Time    WBC 2.3 (LL) 11/20/2020 12:00 AM    HGB 13.7 11/20/2020 12:00 AM    HCT 40.2 11/20/2020 12:00 AM PLATELET 369 31/03/7284 12:00 AM    MCV 87 11/20/2020 12:00 AM     Lab Results   Component Value Date/Time    Hemoglobin A1c 5.7 (H) 09/25/2017 02:22 AM     Lab Results   Component Value Date/Time    Cholesterol, total 153 11/20/2020 12:00 AM    HDL Cholesterol 92 11/20/2020 12:00 AM    LDL, calculated 51 11/20/2020 12:00 AM    LDL, calculated 107 (H) 07/06/2018 10:44 AM    VLDL, calculated 10 11/20/2020 12:00 AM    VLDL, calculated 12 07/06/2018 10:44 AM    Triglyceride 45 11/20/2020 12:00 AM    CHOL/HDL Ratio 2.1 07/06/2018 10:44 AM       Appearance: alert, well appearing, and in no distress. General exam BP noted to be well controlled today in office, S1, S2 normal, no gallop, no murmur, chest clear, no JVD, no HSM, no edema. Lab review: orders written for new lab studies as appropriate; see orders. Assessment/Plan:     hypertension stable, hyperlipidemia stable. orders and follow up as documented in patient record. ICD-10-CM ICD-9-CM    1. Medicare annual wellness visit, subsequent  Z00.00 V70.0    2. Essential hypertension, benign  I14 301.2 METABOLIC PANEL, BASIC      lisinopriL (PRINIVIL, ZESTRIL) 10 mg tablet   3. Mixed hyperlipidemia  E78.2 272.2 atorvastatin (LIPITOR) 40 mg tablet      LIPID PANEL      CANCELED: LIPID PANEL   4. Screening for depression  Z13.31 V79.0 DEPRESSION SCREEN ANNUAL   5. Encounter for hepatitis C screening test for low risk patient  Z11.59 V73.89 HEPATITIS C AB      CANCELED: HEPATITIS C AB   6.  Encounter for immunization  Z23 V03.89 FLU (FLUAD QUAD INFLUENZA VACCINE,QUAD,ADJUVANTED)      ADMIN INFLUENZA VIRUS VAC      PNEUMOCOCCAL POLYSACCHARIDE VACCINE, 23-VALENT, ADULT OR IMMUNOSUPPRESSED PT DOSE,      ADMIN PNEUMOCOCCAL VACCINE      varicella-zoster recombinant, PF, (Shingrix, PF,) 50 mcg/0.5 mL susr injection      CANCELED: INFLUENZA, HIGH DOSE SEASONAL       Follow-up and Dispositions    · Return in about 6 months (around 5/23/2022), or if symptoms worsen or fail to improve, for Hypertension, High Cholesterol, (In Office), Labs 1 Week Prior. Disclaimer:    I have discussed the diagnosis with the patient and the intended plan as seen above. The patient understands our medical plan. The risks, benefits and significant side effects of all medications have been reviewed. Anticipated time course and progression of condition reviewed. All questions have been addressed. She received an after visit summary, with information reviewed, and questions answered. Where appropriate, she is instructed to call the clinic if she has not been notified either by phone or through [phone] with the results of her tests or with an appointment plan for any referrals within 1 week(s). The patient  is to call if her condition worsens or fails to improve or if significant side effects are experienced.      Reji Jaime NP     11/23/21

## 2021-11-23 NOTE — PROGRESS NOTES
This is the Subsequent Medicare Annual Wellness Exam, performed 12 months or more after the Initial AWV or the last Subsequent AWV    I have reviewed the patient's medical history in detail and updated the computerized patient record. Assessment/Plan   Education and counseling provided:  Are appropriate based on today's review and evaluation    1. Medicare annual wellness visit, subsequent  2. Essential hypertension, benign  -     METABOLIC PANEL, BASIC; Future  -     lisinopriL (PRINIVIL, ZESTRIL) 10 mg tablet; Take 1 Tablet by mouth daily. , Normal, Disp-90 Tablet, R-3  3. Mixed hyperlipidemia  -     atorvastatin (LIPITOR) 40 mg tablet; Take 1 Tablet by mouth daily. , Normal, Disp-90 Tablet, R-3  -     LIPID PANEL; Future  4. Screening for depression  -     DEPRESSION SCREEN ANNUAL  5. Encounter for hepatitis C screening test for low risk patient  -     HEPATITIS C AB; Future  6. Encounter for immunization  -     FLU (FLUAD QUAD INFLUENZA VACCINE,QUAD,ADJUVANTED)  -     ADMIN INFLUENZA VIRUS VAC  -     PNEUMOCOCCAL POLYSACCHARIDE VACCINE, 23-VALENT, ADULT OR IMMUNOSUPPRESSED PT DOSE,  -     ADMIN PNEUMOCOCCAL VACCINE  -     varicella-zoster recombinant, PF, (Shingrix, PF,) 50 mcg/0.5 mL susr injection; 0.5mL by IntraMUSCular route once now and then repeat in 2-6 months, Print, Disp-0.5 mL, R-1       Depression Risk Factor Screening     3 most recent PHQ Screens 2/23/2021   PHQ Not Done Active Diagnosis of Depression or Bipolar Disorder       Alcohol Risk Screen    Do you average more than 1 drink per night or more than 7 drinks a week:  No    On any one occasion in the past three months have you have had more than 3 drinks containing alcohol:  No        Functional Ability and Level of Safety    Hearing: Hearing is good. Activities of Daily Living:   The home contains: handrails and grab bars  Patient needs help with:  transportation, shopping, preparing meals, laundry, housework, managing medications, eating, dressing, bathing, hygiene, bathroom needs and walking      Ambulation: with difficulty, uses a walker     Fall Risk:  Fall Risk Assessment, last 12 mths 11/23/2021   Able to walk? Yes   Fall in past 12 months? 1   Number of falls in past 12 months 2   Fall with injury? 0      Abuse Screen:  Patient is not abused       Cognitive Screening    Has your family/caregiver stated any concerns about your memory: yes - Pt has Alzheimers     Cognitive Screening: Pt has [address] Maintenance Due     Health Maintenance Due   Topic Date Due    Hepatitis C Screening  Never done    DTaP/Tdap/Td series (1 - Tdap) Never done    Colorectal Cancer Screening Combo  Never done    Shingrix Vaccine Age 50> (1 of 2) Never done    Breast Cancer Screen Mammogram  06/06/2013    Bone Densitometry (Dexa) Screening  Never done    COVID-19 Vaccine (2 - Moderna 3-dose booster series) 11/23/2021    Lipid Screen  11/20/2021       Patient Care Team   Patient Care Team:  Javier Rosado NP as PCP - General (Nurse Practitioner)  Javier Rosado NP as PCP - [facility] Empaneled Provider    History     Patient Active Problem List   Diagnosis Code    Bradycardia R00.1    Parkinson's disease ([facility] [address] G20    Essential hypertension, benign I10    Depression F32. A    Mixed hyperlipidemia E78.2     Past Medical History:   Diagnosis Date    CVA (cerebral vascular accident) ([facility] [address]     distant past    Depression     with atypical psychosis    Hyperlipidemia     Hypertension     Parkinson disease ([facility] [address]     with dementia      Past Surgical History:   Procedure Laterality Date    HX HYSTERECTOMY       Current Outpatient Medications   Medication Sig Dispense Refill    varicella-zoster recombinant, PF, (Shingrix, PF,) 50 mcg/0.5 mL susr injection 0.5mL by IntraMUSCular route once now and then repeat in 2-6 months 0.5 mL 1    atorvastatin (LIPITOR) 40 mg tablet Take 1 Tablet by mouth daily.  90 Tablet 3    lisinopriL (PRINIVIL, ZESTRIL) 10 mg tablet Take 1 Tablet by mouth daily. 90 Tablet 3    fluvoxaMINE (LUVOX) 50 mg tablet Take  by mouth nightly.  aspirin 81 mg chewable tablet Take 81 mg by mouth daily.  risperidone (RISPERDAL) 2 mg tablet Take 2 mg by mouth two (2) times a day.        Allergies   Allergen Reactions    Ativan [Lorazepam] Other (comments)     Insomnia, visual hallucinations       Family History   Problem Relation Age of Onset    Hypertension Mother     Hypertension Father      Social History     Tobacco Use    Smoking status: Never Smoker    Smokeless tobacco: Never Used   Substance Use Topics    Alcohol use: No         Kika Aguilera NP

## 2021-11-23 NOTE — PROGRESS NOTES
Depression Screening:  3 most recent PHQ Screens 2/23/2021   PHQ Not Done Active Diagnosis of Depression or Bipolar Disorder       Learning Assessment:  Learning Assessment 1/3/2018   PRIMARY LEARNER [place]   HIGHEST LEVEL OF EDUCATION - PRIMARY LEARNER  GRADUATED HIGH SCHOOL OR GED   BARRIERS PRIMARY LEARNER NONE   PRIMARY LANGUAGE ENGLISH    NEED No   LEARNER PREFERENCE PRIMARY DEMONSTRATION     READING   ANSWERED BY corine   RELATIONSHIP OTHER       Abuse Screening:  Abuse Screening Questionnaire 4/2/2018   Do you ever feel afraid of your partner? N   Are you in a relationship with someone who physically or mentally threatens you? N   Is it safe for you to go home? Y       Fall Risk  Fall Risk Assessment, last 12 mths 11/23/2021   Able to walk? Yes   Fall in past 12 months? 1   Number of falls in past 12 months 2   Fall with injury? 0       ADL  ADL Assessment 1/8/2019   Feeding yourself No Help Needed   Getting from bed to chair No Help Needed   Getting dressed Help Needed   Bathing or showering Help Needed   Walk across the room (includes cane/walker) No Help Needed   Using the telphone Help Needed   Taking your medications Help Needed   Preparing meals Help Needed   Managing money (expenses/bills) Help Needed   Moderately strenuous housework (laundry) Help Needed   Shopping for personal items (toiletries/medicines) Help Needed   Shopping for groceries Help Needed   Driving Help Needed   Climbing a flight of stairs Help Needed   Getting to places beyond walking distances Help Needed       Travel Screening:    Travel Screening     Question   Response    In the last month, have you been in contact with someone who was confirmed or suspected to have Coronavirus / COVID-19? No / Unsure    Have you had a COVID-19 viral test in the last 14 days? No    Do you have any of the following new or worsening symptoms? None of these    Have you traveled internationally or domestically in the last month?   No Travel History   Travel since 10/23/21    No documented travel since 10/23/21         Health Maintenance reviewed and discussed and ordered per Provider. Health Maintenance Due   Topic Date Due    Hepatitis C Screening  Never done    DTaP/Tdap/Td series (1 - Tdap) Never done    Colorectal Cancer Screening Combo  Never done    Shingrix Vaccine Age 50> (1 of 2) Never done    Breast Cancer Screen Mammogram  06/06/2013    Bone Densitometry (Dexa) Screening  Never done    Pneumococcal 65+ years (1 of 1 - PPSV23) Never done    Medicare Yearly Exam  06/24/2021    Flu Vaccine (1) 09/01/2021    COVID-19 Vaccine (2 - Moderna 3-dose booster series) 11/23/2021    Lipid Screen  11/20/2021   . Maurice Monsalve presents today for   Chief Complaint   Patient presents with    Medication Refill    Follow-up       Is someone accompanying this pt? no    Is the patient using any DME equipment during OV? no    Coordination of Care:  1. Have you been to the ER, urgent care clinic since your last visit? Hospitalized since your last visit? no    2. Have you seen or consulted any other health care providers outside of the [address] [place] [address] since your last visit? Include any pap smears or colon screening.  no

## 2022-03-02 ENCOUNTER — TELEPHONE (OUTPATIENT)
Dept: FAMILY MEDICINE CLINIC | Age: 72
End: 2022-03-02

## 2022-03-18 PROBLEM — E78.2 MIXED HYPERLIPIDEMIA: Status: ACTIVE | Noted: 2018-01-03

## 2022-03-19 PROBLEM — I10 ESSENTIAL HYPERTENSION, BENIGN: Status: ACTIVE | Noted: 2017-10-04

## 2022-03-19 PROBLEM — G20.A1 PARKINSON'S DISEASE: Status: ACTIVE | Noted: 2017-09-24

## 2022-03-19 PROBLEM — F32.A DEPRESSION: Status: ACTIVE | Noted: 2017-10-04

## 2022-03-19 PROBLEM — G20 PARKINSON'S DISEASE (HCC): Status: ACTIVE | Noted: 2017-09-24

## 2022-03-19 PROBLEM — R00.1 BRADYCARDIA: Status: ACTIVE | Noted: 2017-09-24

## 2022-03-31 ENCOUNTER — HOSPITAL ENCOUNTER (EMERGENCY)
Age: 72
Discharge: HOME | End: 2022-03-31
Attending: EMERGENCY MEDICINE
Payer: MEDICARE

## 2022-03-31 ENCOUNTER — APPOINTMENT (OUTPATIENT)
Dept: CT IMAGING | Age: 72
End: 2022-03-31
Attending: EMERGENCY MEDICINE
Payer: MEDICARE

## 2022-03-31 VITALS
BODY MASS INDEX: 17.57 KG/M2 | SYSTOLIC BLOOD PRESSURE: 147 MMHG | RESPIRATION RATE: 16 BRPM | OXYGEN SATURATION: 100 % | TEMPERATURE: 98 F | HEART RATE: 57 BPM | HEIGHT: 60 IN | DIASTOLIC BLOOD PRESSURE: 72 MMHG

## 2022-03-31 DIAGNOSIS — S00.83XA CONTUSION OF FOREHEAD, INITIAL ENCOUNTER: Primary | ICD-10-CM

## 2022-03-31 PROCEDURE — 99284 EMERGENCY DEPT VISIT MOD MDM: CPT

## 2022-03-31 PROCEDURE — 70450 CT HEAD/BRAIN W/O DYE: CPT

## 2022-03-31 NOTE — ED NOTES
Assisted pt to stand and walk per MD request. Pt smelled of urine. RN cleaned her up and changed socks and brief. Pt had 3 dirty briefs on. After ambulation, RN took temperature and cleaned dry blood off of pts nose and face. Pt unable to follow simple instruction. Had to be redirected multiple times.

## 2022-03-31 NOTE — Clinical Note
Gema Willoughby was seen and treated in our emergency department on 3/31/2022.     Mr Stacey Alicea was here in the ED with the patient    Dwight Hale MD

## 2022-03-31 NOTE — ED TRIAGE NOTES
Pt arrived via medic from home after a reported fall from medical alert bracelet alerted.    R knee abraision

## 2022-03-31 NOTE — ED PROVIDER NOTES
EMERGENCY DEPARTMENT HISTORY AND PHYSICAL EXAM    3:45 AM patient seen at this time on EMS stretcher in [facility] to expedite care      Date: 3/31/2022  Patient Name: Pino Rivera    History of Presenting Illness     Chief Complaint   Patient presents with    Fall         History Provided By: patient    Additional History (Context): Pino Rivera is a 70 y.o. female presents with comes from home, she was looking in the refrigerator for some food suffered a fall and hit her medical alert bracelet paramedics came she is not complaining of anything to them at the time of first patient contact and no complaint of pain or traumatic injury to me here in the ER. Incidentally paramedics concerned for the safety of the patient, lives with her  who is blind and apparently they sleep in chairs and on a couch covered with plastic and not much food in the house. Paramedics will be filing APS report. Estefany Records PCP: Patel Zelaya NP    Chief Complaint:   Duration:    Timing:    Location:   Quality:   Severity:   Modifying Factors:   Associated Symptoms:       Current Outpatient Medications   Medication Sig Dispense Refill    varicella-zoster recombinant, PF, (Shingrix, PF,) 50 mcg/0.5 mL susr injection 0.5mL by IntraMUSCular route once now and then repeat in 2-6 months 0.5 mL 1    atorvastatin (LIPITOR) 40 mg tablet Take 1 Tablet by mouth daily. 90 Tablet 3    lisinopriL (PRINIVIL, ZESTRIL) 10 mg tablet Take 1 Tablet by mouth daily. 90 Tablet 3    fluvoxaMINE (LUVOX) 50 mg tablet Take  by mouth nightly.  aspirin 81 mg chewable tablet Take 81 mg by mouth daily.  risperidone (RISPERDAL) 2 mg tablet Take 2 mg by mouth two (2) times a day.          Past History     Past Medical History:  Past Medical History:   Diagnosis Date    CVA (cerebral vascular accident) ([facility] [address]     distant past    Dementia ([facility] [address]     Depression     with atypical psychosis    Hyperlipidemia     Hypertension     Parkinson disease ([facility] [address]     with dementia       Past Surgical History:  Past Surgical History:   Procedure Laterality Date    HX HYSTERECTOMY         Family History:  Family History   Problem Relation Age of Onset    Hypertension Mother     Hypertension Father        Social History:  Social History     Tobacco Use    Smoking status: Never Smoker    Smokeless tobacco: Never Used   Vaping Use    Vaping Use: Never used   Substance Use Topics    Alcohol use: No    Drug use: No       Allergies: Allergies   Allergen Reactions    Ativan [Lorazepam] Other (comments)     Insomnia, visual hallucinations         Review of Systems     Review of Systems   Constitutional: Negative for diaphoresis and fever. HENT: Negative for congestion and sore throat. Eyes: Negative for pain and itching. Respiratory: Negative for cough and shortness of breath. Cardiovascular: Negative for chest pain and palpitations. Gastrointestinal: Negative for abdominal pain and diarrhea. Endocrine: Negative for polydipsia and polyuria. Genitourinary: Negative for dysuria and hematuria. Musculoskeletal: Negative for arthralgias and myalgias. Skin: Negative for rash and wound. Neurological: Negative for seizures and syncope. Hematological: Does not bruise/bleed easily. Psychiatric/Behavioral: Negative for agitation and hallucinations. Physical Exam       Patient Vitals for the past 12 hrs:   Temp Pulse Resp BP SpO2   03/31/22 0430 -- -- -- (!) 147/72 100 %   03/31/22 0400 -- -- -- (!) 160/82 100 %   03/31/22 0353 98 °F (36.7 °C) (!) 57 16 (!) 152/81 100 %       IPVITALS  Patient Vitals for the past 24 hrs:   BP Temp Pulse Resp SpO2 Height   03/31/22 0430 (!) 147/72 -- -- -- 100 % --   03/31/22 0400 (!) 160/82 -- -- -- 100 % --   03/31/22 0353 (!) 152/81 98 °F (36.7 °C) (!) 57 16 100 % 5' (1.524 m)       Physical Exam  Vitals and nursing note reviewed. Constitutional:       Appearance: She is well-developed.    HENT: Head: Normocephalic. Comments: Midline 1 inch diameter area of swelling, seems to be a rubbery consistency mass. No fluctuance. No ecchymosis. Doubt this represents hematoma. No skull deformity. Eyes:      General: No scleral icterus. Conjunctiva/sclera: Conjunctivae normal.   Neck:      Vascular: No JVD. Cardiovascular:      Rate and Rhythm: Normal rate and regular rhythm. Heart sounds: Normal heart sounds. Comments: 4 intact extremity pulses  Pulmonary:      Effort: Pulmonary effort is normal.      Breath sounds: Normal breath sounds. Abdominal:      Palpations: Abdomen is soft. There is no mass. Tenderness: There is no abdominal tenderness. Musculoskeletal:         General: Normal range of motion. Cervical back: Normal range of motion and neck supple. Lymphadenopathy:      Cervical: No cervical adenopathy. Skin:     General: Skin is warm and dry. Neurological:      General: No focal deficit present. Mental Status: She is alert. Cranial Nerves: No cranial nerve deficit. Diagnostic Study Results   Labs -  No results found for this or any previous visit (from the past 24 hour(s)). Radiologic Studies -   CT HEAD WO CONT   Final Result      No clearly acute intracranial findings. Frontal scalp hematoma noted. Moderate burden of periventricular and patchy subcortical low-attenuation,   likely chronic microvascular ischemic change. -Bilateral basal ganglia region remote lacunar infarcts. Prominent cerebral level ventricles, increased since 2017, likely combination of   cortical and central atrophy, but correlate clinically for possibility of normal   pressure hydrocephalus. CT HEAD WO CONT    Result Date: 3/31/2022  EXAMINATION: CT head without contrast INDICATION: Fall, frontal scalp hematoma COMPARISON: CT 9/25/2017 TECHNIQUE: CT head without contrast performed.  All CT scans at this facility are performed using dose optimization technique as appropriate to a performed exam, to include automated exposure control, adjustment of the mA and/or kV according to patient size (including appropriate matching first site specific examinations), or use of iterative reconstruction technique. FINDINGS: Motion limits evaluation. No focal mass effect or shift of midline structures. No abnormal extra-axial collection. Cerebral level ventricles are slightly prominent, increased compared to prior. Moderate burden of periventricular and patchy subcortical white matter low-attenuation. Bilateral basal ganglion region remote lacunar infarcts. No acute intracranial hemorrhage. Minimal scattered intracranial atherosclerotic calcifications. Imaged paranasal sinuses and mastoids well-aerated. Calvarium intact. Left frontal scalp hematoma. No clearly acute intracranial findings. Frontal scalp hematoma noted. Moderate burden of periventricular and patchy subcortical low-attenuation, likely chronic microvascular ischemic change. -Bilateral basal ganglia region remote lacunar infarcts. Prominent cerebral level ventricles, increased since 2017, likely combination of cortical and central atrophy, but correlate clinically for possibility of normal pressure hydrocephalus. Medications ordered:   Medications - No data to display      Medical Decision Making   Initial Medical Decision Making and DDx:  Patient without complaint. Does have mass on her forehead will get head CT to ensure no alarming traumatic findings. APS will be contacted. Anticipate uneventful discharge for the patient    ED Course: Progress Notes, Reevaluation, and Consults:  ED Course as of 03/31/22 0553   Thu Mar 31, 2022   5620 Discussed with daughter at bedside, she had no further information. Relayed story from paramedic, plan to CT head, no further complaint of pain or injury. The bump on her head is apparently new.  [CB]      ED Course User Index  [CB] Zafar Gary MD     5:53 AM Pt reevaluated at this time. Discussed results and findings, as well as, diagnosis and plan for discharge. Follow up with doctors/services listed. Return to the emergency department for alarming symptoms. Pt verbalizes understanding and agreement with plan. All questions addressed. Forehead hematoma no other alarming findings on CT scan discussed with patient's daughter. They are agreeable with plan for discharge patient in no acute distress on reassessment. I am the first provider for this patient. I reviewed the vital signs, available nursing notes, past medical history, past surgical history, family history and social history. Patient Vitals for the past 12 hrs:   Temp Pulse Resp BP SpO2   03/31/22 0430 -- -- -- (!) 147/72 100 %   03/31/22 0400 -- -- -- (!) 160/82 100 %   03/31/22 0353 98 °F (36.7 °C) (!) 57 16 (!) 152/81 100 %       Vital Signs-Reviewed the patient's vital signs. Pulse Oximetry Analysis, Cardiac Monitor, 12 lead ekg:      Interpreted by the EP. Records Reviewed: Nursing notes reviewed (Time of Review: 3:45 AM)    Procedures:   Critical Care Time:   Aspirin: (was aspirin given for stroke?)    Diagnosis     Clinical Impression:   1. Contusion of forehead, initial encounter        Disposition: Discharged      Follow-up Information     Follow up With Specialties Details Why Contact Info    Bina Stein NP Nurse Practitioner   [address] [facility]  [address]  [address] [phone]             Patient's Medications   Start Taking    No medications on file   Continue Taking    ASPIRIN 81 MG CHEWABLE TABLET    Take 81 mg by mouth daily. ATORVASTATIN (LIPITOR) 40 MG TABLET    Take 1 Tablet by mouth daily. FLUVOXAMINE (LUVOX) 50 MG TABLET    Take  by mouth nightly. LISINOPRIL (PRINIVIL, ZESTRIL) 10 MG TABLET    Take 1 Tablet by mouth daily. RISPERIDONE (RISPERDAL) 2 MG TABLET    Take 2 mg by mouth two (2) times a day.     VARICELLA-ZOSTER RECOMBINANT, PF, (SHINGRIX, PF,) 50 MCG/0.5 ML SUSR INJECTION    0.5mL by IntraMUSCular route once now and then repeat in 2-6 months   These Medications have changed    No medications on file   Stop Taking    No medications on file     _______________________________    Notes:    Angelique Chin MD using Dragon dictation      _______________________________

## 2022-05-16 ENCOUNTER — HOSPITAL ENCOUNTER (OUTPATIENT)
Dept: LAB | Age: 72
Discharge: HOME | End: 2022-05-16

## 2022-05-16 LAB — XX-LABCORP SPECIMEN COL,LCBCF: NORMAL

## 2022-05-16 PROCEDURE — 99001 SPECIMEN HANDLING PT-LAB: CPT

## 2022-05-17 LAB
BUN SERPL-MCNC: 24 MG/DL (ref 8–27)
BUN/CREAT SERPL: 30 (ref 12–28)
CALCIUM SERPL-MCNC: 9.2 MG/DL (ref 8.7–10.3)
CHLORIDE SERPL-SCNC: 106 MMOL/L (ref 96–106)
CHOLEST SERPL-MCNC: 151 MG/DL (ref 100–199)
CO2 SERPL-SCNC: 23 MMOL/L (ref 20–29)
CREAT SERPL-MCNC: 0.8 MG/DL (ref 0.57–1)
EGFR: 79 ML/MIN/1.73
GLUCOSE SERPL-MCNC: 103 MG/DL (ref 65–99)
HCV AB S/CO SERPL IA: <0.1 S/CO RATIO (ref 0–0.9)
HDLC SERPL-MCNC: 89 MG/DL
LDLC SERPL CALC-MCNC: 53 MG/DL (ref 0–99)
POTASSIUM SERPL-SCNC: 3.8 MMOL/L (ref 3.5–5.2)
SODIUM SERPL-SCNC: 146 MMOL/L (ref 134–144)
TRIGL SERPL-MCNC: 34 MG/DL (ref 0–149)
VLDLC SERPL CALC-MCNC: 9 MG/DL (ref 5–40)

## 2022-05-23 ENCOUNTER — OFFICE VISIT (OUTPATIENT)
Dept: FAMILY MEDICINE CLINIC | Age: 72
End: 2022-05-23
Payer: MEDICARE

## 2022-05-23 ENCOUNTER — HOSPITAL ENCOUNTER (OUTPATIENT)
Dept: LAB | Age: 72
Discharge: HOME | End: 2022-05-23
Payer: MEDICARE

## 2022-05-23 VITALS
OXYGEN SATURATION: 100 % | BODY MASS INDEX: 19.71 KG/M2 | HEIGHT: 60 IN | WEIGHT: 100.4 LBS | HEART RATE: 46 BPM | TEMPERATURE: 97.2 F | RESPIRATION RATE: 16 BRPM | SYSTOLIC BLOOD PRESSURE: 130 MMHG | DIASTOLIC BLOOD PRESSURE: 82 MMHG

## 2022-05-23 DIAGNOSIS — E78.2 MIXED HYPERLIPIDEMIA: ICD-10-CM

## 2022-05-23 DIAGNOSIS — F02.80 DEMENTIA DUE TO PARKINSON'S DISEASE WITHOUT BEHAVIORAL DISTURBANCE (HCC): ICD-10-CM

## 2022-05-23 DIAGNOSIS — I10 ESSENTIAL HYPERTENSION, BENIGN: Primary | ICD-10-CM

## 2022-05-23 DIAGNOSIS — R73.03 PREDIABETES: ICD-10-CM

## 2022-05-23 DIAGNOSIS — G20 DEMENTIA DUE TO PARKINSON'S DISEASE WITHOUT BEHAVIORAL DISTURBANCE (HCC): ICD-10-CM

## 2022-05-23 LAB
EST. AVERAGE GLUCOSE BLD GHB EST-MCNC: 114 MG/DL
HBA1C MFR BLD: 5.6 % (ref 4.2–5.6)

## 2022-05-23 PROCEDURE — G8420 CALC BMI NORM PARAMETERS: HCPCS | Performed by: NURSE PRACTITIONER

## 2022-05-23 PROCEDURE — 99213 OFFICE O/P EST LOW 20 MIN: CPT | Performed by: NURSE PRACTITIONER

## 2022-05-23 PROCEDURE — 3017F COLORECTAL CA SCREEN DOC REV: CPT | Performed by: NURSE PRACTITIONER

## 2022-05-23 PROCEDURE — 1101F PT FALLS ASSESS-DOCD LE1/YR: CPT | Performed by: NURSE PRACTITIONER

## 2022-05-23 PROCEDURE — G8536 NO DOC ELDER MAL SCRN: HCPCS | Performed by: NURSE PRACTITIONER

## 2022-05-23 PROCEDURE — G8754 DIAS BP LESS 90: HCPCS | Performed by: NURSE PRACTITIONER

## 2022-05-23 PROCEDURE — G8400 PT W/DXA NO RESULTS DOC: HCPCS | Performed by: NURSE PRACTITIONER

## 2022-05-23 PROCEDURE — G8427 DOCREV CUR MEDS BY ELIG CLIN: HCPCS | Performed by: NURSE PRACTITIONER

## 2022-05-23 PROCEDURE — 83036 HEMOGLOBIN GLYCOSYLATED A1C: CPT

## 2022-05-23 PROCEDURE — 1090F PRES/ABSN URINE INCON ASSESS: CPT | Performed by: NURSE PRACTITIONER

## 2022-05-23 PROCEDURE — G9717 DOC PT DX DEP/BP F/U NT REQ: HCPCS | Performed by: NURSE PRACTITIONER

## 2022-05-23 PROCEDURE — 36415 COLL VENOUS BLD VENIPUNCTURE: CPT

## 2022-05-23 PROCEDURE — G8752 SYS BP LESS 140: HCPCS | Performed by: NURSE PRACTITIONER

## 2022-05-23 NOTE — PROGRESS NOTES
Office Note    Assessment & Plan:     diabetes prediabetic, hypertension stable, hyperlipidemia stable. Diabetic issues reviewed with her: prediabetic. Hypertension issues reviewed with her: Continue current therapy       Diagnoses and all orders for this visit:    1. Essential hypertension, benign    2. Mixed hyperlipidemia    3. Prediabetes  -     HEMOGLOBIN A1C WITH EAG; Future    4. Dementia due to Parkinson's disease without behavioral disturbance ([facility] [address]           Follow-up and Dispositions    · Return in about 6 months (around 11/23/2022) for Hypertension, High Cholesterol, Prediabetes, (In Office). Subjective: Angel Castillo is a 70 y.o. BLACK/ female who was seen in clinic today (5/23/2022) for evaluation of Hyperlipidemia, Hypertension, and Hospital Follow Up  . Pt is being accompanied by her daughter Barrett Segovia. Pt lives at home alone with her  who is 68, and does everything for himself. Pt's daugther lives about 10 minutes away and is in the house with them from 9 AM-7 PM daily. Pt does not have any home health services. Hypertension/HLD:    Diet and Lifestyle: not attempting to follow a low fat, low cholesterol diet, not attempting to follow a low sodium diet, sedentary, advanced dementia  Home BP Monitoring: is not measured at home    Cardiovascular ROS: taking medications as instructed, no medication side effects noted, no TIA's, no chest pain on exertion, no dyspnea on exertion, no swelling of ankles.      Lab Results   Component Value Date/Time    Sodium 146 (H) 05/16/2022 12:00 AM    Potassium 3.8 05/16/2022 12:00 AM    Chloride 106 05/16/2022 12:00 AM    CO2 23 05/16/2022 12:00 AM    Anion gap 8 07/06/2018 10:44 AM    Glucose 103 (H) 05/16/2022 12:00 AM    BUN 24 05/16/2022 12:00 AM    Creatinine 0.80 05/16/2022 12:00 AM    BUN/Creatinine ratio 30 (H) 05/16/2022 12:00 AM    GFR est AA CANCELED 11/20/2020 12:00 AM    GFR est non-AA CANCELED 11/20/2020 12:00 AM    Calcium 9.2 05/16/2022 12:00 AM      Lab Results   Component Value Date/Time    WBC 2.3 (LL) 11/20/2020 12:00 AM    HGB 13.7 11/20/2020 12:00 AM    HCT 40.2 11/20/2020 12:00 AM    PLATELET 831 76/68/7881 12:00 AM    MCV 87 11/20/2020 12:00 AM      Lab Results   Component Value Date/Time    Hemoglobin A1c 5.7 (H) 09/25/2017 02:22 AM      Lab Results   Component Value Date/Time    Cholesterol, total 151 05/16/2022 12:00 AM    HDL Cholesterol 89 05/16/2022 12:00 AM    LDL, calculated 53 05/16/2022 12:00 AM    LDL, calculated 107 (H) 07/06/2018 10:44 AM    VLDL, calculated 9 05/16/2022 12:00 AM    VLDL, calculated 12 07/06/2018 10:44 AM    Triglyceride 34 05/16/2022 12:00 AM    CHOL/HDL Ratio 2.1 07/06/2018 10:44 AM        Key Antihyperlipidemia Meds             atorvastatin (LIPITOR) 40 mg tablet (Taking) Take 1 Tablet by mouth daily. New concerns: Pt is no longer seeing neurology. Pt's daughter states that she has not been seen since 2018, when they told her that her dementia was end stage and that she no longer required neurology services. Pt's daughter . We will stop ASA today, Pt's daughter is refusing her colonoscopy, and mammogram on this visit    Diabetes:  Home glucoses: Pt's daughter is not checking her BS, Pt is prediabetic    Last A1c:   Lab Results   Component Value Date/Time    Hemoglobin A1c 5.7 (H) 09/25/2017 02:22 AM        Diabetic foot exam:     No Diabetic HM Topics for this patient        Objective:     Visit Vitals  /82 (BP 1 Location: Left upper arm, BP Patient Position: Sitting, BP Cuff Size: Small child)   Pulse (!) 46   Temp 97.2 °F (36.2 °C) (Temporal)   Resp 16   Ht 5' (1.524 m)   Wt 100 lb 6.4 oz (45.5 kg)   SpO2 100%   BMI 19.61 kg/m²       Appearance: alert, well appearing, and in no distress.   Exam: heart sounds normal rate, regular rhythm, normal S1, S2, no murmurs, rubs, clicks or gallops, chest clear, no carotid bruits  Lab review: labs are reviewed, up to date. Disclaimer:    I have discussed the diagnosis with the patient and the intended plan as seen above. The patient understands our medical plan. The risks, benefits and significant side effects of all medications have been reviewed. Anticipated time course and progression of condition reviewed. All questions have been addressed. She received an after visit summary, with information reviewed, and questions answered. Where appropriate, she is instructed to call the clinic if she has not been notified either by phone or through [address] with the results of her tests or with an appointment plan for any referrals within 1 week(s). The patient  is to call if her condition worsens or fails to improve or if significant side effects are experienced.        Kim Obrien NP

## 2022-10-25 DIAGNOSIS — E78.2 MIXED HYPERLIPIDEMIA: ICD-10-CM

## 2022-10-26 RX ORDER — ATORVASTATIN CALCIUM 40 MG/1
TABLET, FILM COATED ORAL
Qty: 90 TABLET | Refills: 3 | Status: SHIPPED | OUTPATIENT
Start: 2022-10-26

## 2022-10-31 DIAGNOSIS — I10 ESSENTIAL HYPERTENSION, BENIGN: ICD-10-CM

## 2022-10-31 RX ORDER — LISINOPRIL 10 MG/1
TABLET ORAL
Qty: 90 TABLET | Refills: 3 | Status: SHIPPED | OUTPATIENT
Start: 2022-10-31

## 2022-11-08 ENCOUNTER — TELEPHONE (OUTPATIENT)
Dept: FAMILY MEDICINE CLINIC | Age: 72
End: 2022-11-08

## 2022-11-08 NOTE — TELEPHONE ENCOUNTER
Daughter April (POA-scanned 9/2017) called about lab results recvd in the mail for A1C levels and wants to discuss diet changes, adv pt does have appt on 11/28 follow up and can discuss labs and changes if needed at that time

## 2022-11-28 ENCOUNTER — HOME HEALTH ADMISSION (OUTPATIENT)
Dept: HOME HEALTH SERVICES | Facility: HOME HEALTH | Age: 72
End: 2022-11-28
Payer: MEDICARE

## 2022-11-28 ENCOUNTER — OFFICE VISIT (OUTPATIENT)
Dept: FAMILY MEDICINE CLINIC | Age: 72
End: 2022-11-28
Payer: MEDICARE

## 2022-11-28 VITALS
BODY MASS INDEX: 21.44 KG/M2 | WEIGHT: 109.2 LBS | HEIGHT: 60 IN | SYSTOLIC BLOOD PRESSURE: 123 MMHG | DIASTOLIC BLOOD PRESSURE: 70 MMHG | TEMPERATURE: 97.2 F | RESPIRATION RATE: 16 BRPM | HEART RATE: 57 BPM | OXYGEN SATURATION: 97 %

## 2022-11-28 DIAGNOSIS — I10 ESSENTIAL HYPERTENSION, BENIGN: ICD-10-CM

## 2022-11-28 DIAGNOSIS — Z00.00 MEDICARE ANNUAL WELLNESS VISIT, SUBSEQUENT: Primary | ICD-10-CM

## 2022-11-28 DIAGNOSIS — R73.03 PREDIABETES: ICD-10-CM

## 2022-11-28 DIAGNOSIS — G20 DEMENTIA DUE TO PARKINSON'S DISEASE WITHOUT BEHAVIORAL DISTURBANCE (HCC): ICD-10-CM

## 2022-11-28 DIAGNOSIS — Z13.31 SCREENING FOR DEPRESSION: ICD-10-CM

## 2022-11-28 DIAGNOSIS — Z23 ENCOUNTER FOR IMMUNIZATION: ICD-10-CM

## 2022-11-28 DIAGNOSIS — F02.80 DEMENTIA DUE TO PARKINSON'S DISEASE WITHOUT BEHAVIORAL DISTURBANCE (HCC): ICD-10-CM

## 2022-11-28 DIAGNOSIS — E78.2 MIXED HYPERLIPIDEMIA: ICD-10-CM

## 2022-11-28 PROCEDURE — G0008 ADMIN INFLUENZA VIRUS VAC: HCPCS | Performed by: NURSE PRACTITIONER

## 2022-11-28 PROCEDURE — 90694 VACC AIIV4 NO PRSRV 0.5ML IM: CPT | Performed by: NURSE PRACTITIONER

## 2022-11-28 PROCEDURE — G9717 DOC PT DX DEP/BP F/U NT REQ: HCPCS | Performed by: NURSE PRACTITIONER

## 2022-11-28 PROCEDURE — 1123F ACP DISCUSS/DSCN MKR DOCD: CPT | Performed by: NURSE PRACTITIONER

## 2022-11-28 PROCEDURE — G8752 SYS BP LESS 140: HCPCS | Performed by: NURSE PRACTITIONER

## 2022-11-28 PROCEDURE — G8427 DOCREV CUR MEDS BY ELIG CLIN: HCPCS | Performed by: NURSE PRACTITIONER

## 2022-11-28 PROCEDURE — G8400 PT W/DXA NO RESULTS DOC: HCPCS | Performed by: NURSE PRACTITIONER

## 2022-11-28 PROCEDURE — G8754 DIAS BP LESS 90: HCPCS | Performed by: NURSE PRACTITIONER

## 2022-11-28 PROCEDURE — G9899 SCRN MAM PERF RSLTS DOC: HCPCS | Performed by: NURSE PRACTITIONER

## 2022-11-28 PROCEDURE — G8420 CALC BMI NORM PARAMETERS: HCPCS | Performed by: NURSE PRACTITIONER

## 2022-11-28 PROCEDURE — 3017F COLORECTAL CA SCREEN DOC REV: CPT | Performed by: NURSE PRACTITIONER

## 2022-11-28 PROCEDURE — 3074F SYST BP LT 130 MM HG: CPT | Performed by: NURSE PRACTITIONER

## 2022-11-28 PROCEDURE — 1101F PT FALLS ASSESS-DOCD LE1/YR: CPT | Performed by: NURSE PRACTITIONER

## 2022-11-28 PROCEDURE — 3078F DIAST BP <80 MM HG: CPT | Performed by: NURSE PRACTITIONER

## 2022-11-28 PROCEDURE — G8536 NO DOC ELDER MAL SCRN: HCPCS | Performed by: NURSE PRACTITIONER

## 2022-11-28 PROCEDURE — G0439 PPPS, SUBSEQ VISIT: HCPCS | Performed by: NURSE PRACTITIONER

## 2022-11-28 NOTE — PROGRESS NOTES
1. \"Have you been to the ER, urgent care clinic since your last visit? Hospitalized since your last visit? \" No    2. \"Have you seen or consulted any other health care providers outside of the [address] [place] [address] since your last visit? \"  [email or web address]       3. For patients aged 39-70: Has the patient had a colonoscopy / FIT/ Cologuard? No      If the patient is female:    4. For patients aged 41-77: Has the patient had a mammogram within the past 2 years? No      5. For patients aged 21-65: Has the patient had a pap smear?  NA - based on age or sex

## 2022-11-28 NOTE — PROGRESS NOTES
Hypertension      Assessment/Plan:     hypertension stable, hyperlipidemia stable. lab results and schedule of future lab studies reviewed with patient  repeat labs ordered prior to next appointment  orders and follow up as documented in patient record. Diagnoses and all orders for this visit:    1. Medicare annual wellness visit, subsequent    2. Essential hypertension, benign    3. Mixed hyperlipidemia    4. Prediabetes    5. Screening for depression  -     DEPRESSION SCREEN ANNUAL    6. Dementia due to Parkinson's disease without behavioral disturbance ([facility] [address]  -     AMB SUPPLY ORDER  -     REFERRAL TO HOME HEALTH    7. Encounter for immunization  -     INFLUENZA, FLUAD, (AGE 72 Y+), IM, PF, 0.5 ML       Follow-up and Dispositions    Return in about 1 year (around 11/28/2023), or if symptoms worsen or fail to improve, for HLD, HTN, Prediabetes, Annual Wellness Visit, (In Office). Subjective: Bismark Farrell is a 67 y.o. BLACK/ female who presents for follow-up of hypertension. Pt denies any SOB, edema, CP, orthopnea, palpitations, nocturnal dyspnea, headaches, or blurred vision. Diet and Lifestyle:  No special diet required  Home BP Monitoring: is well controlled at home, ranging 120's/70's    Cardiovascular ROS: taking medications as instructed, no medication side effects noted, no TIA's, no chest pain on exertion, no dyspnea on exertion, no swelling of ankles. New concerns: Pt is living at home with her  who is blind and has stage 4 kidney and bladder cancer, who has refused treatment. Pt daughter Eden William, who is there with them from the time they wake up until putting them to bed at night. Pt stepfather is getting hospice care with an agency. Pt's daughter was encouraged to see if the services with hospice will her for her mom as well.  Caregiver was informed that she could just call the office for a referral if and when she needed the referral for the current company. Caregiver is also working under [employer] and is being paid for being her caregiver and does not want that to interfere because she is no longer working    Key Antihyperlipidemia Meds               atorvastatin (LIPITOR) 40 mg tablet (Taking) TAKE ONE TABLET EVERY DAY          Endocrine Review  She is seen for pre-diabetes. Since last visit: no significant changes. Home glucose monitoring: is not performed. Diabetic diet compliance: no diet. Lab review: labs reviewed and discussed with patient. Lab Results   Component Value Date/Time    Hemoglobin A1c 5.6 05/23/2022 11:03 AM      Last Point of Care HGB A1C  No results found for: [id number]       Current Outpatient Medications   Medication Sig Dispense Refill    lisinopriL (PRINIVIL, ZESTRIL) 10 mg tablet TAKE ONE TABLET BY MOUTH EVERY DAY 90 Tablet 3    atorvastatin (LIPITOR) 40 mg tablet TAKE ONE TABLET EVERY DAY 90 Tablet 3    fluvoxaMINE (LUVOX) 50 mg tablet Take  by mouth nightly. risperidone (RISPERDAL) 2 mg tablet Take 2 mg by mouth two (2) times a day. Review of Systems, additional:  Pertinent items are noted in HPI.     Objective:   Visit Vitals  /70 (BP 1 Location: Left upper arm, BP Patient Position: Sitting, BP Cuff Size: Adult)   Pulse (!) 57   Temp 97.2 °F (36.2 °C) (Temporal)   Resp 16   Ht 5' (1.524 m)   Wt 109 lb 3.2 oz (49.5 kg)   SpO2 97%   BMI 21.33 kg/m²       Lab Results   Component Value Date/Time    Sodium 146 (H) 05/16/2022 12:00 AM    Potassium 3.8 05/16/2022 12:00 AM    Chloride 106 05/16/2022 12:00 AM    CO2 23 05/16/2022 12:00 AM    Anion gap 8 07/06/2018 10:44 AM    Glucose 103 (H) 05/16/2022 12:00 AM    BUN 24 05/16/2022 12:00 AM    Creatinine 0.80 05/16/2022 12:00 AM    BUN/Creatinine ratio 30 (H) 05/16/2022 12:00 AM    GFR est AA CANCELED 11/20/2020 12:00 AM    GFR est non-AA CANCELED 11/20/2020 12:00 AM    Calcium 9.2 05/16/2022 12:00 AM     Lab Results   Component Value Date/Time    WBC 2.3 (LL) 11/20/2020 12:00 AM    HGB 13.7 11/20/2020 12:00 AM    HCT 40.2 11/20/2020 12:00 AM    PLATELET 058 98/73/0380 12:00 AM    MCV 87 11/20/2020 12:00 AM     Lab Results   Component Value Date/Time    Hemoglobin A1c 5.6 05/23/2022 11:03 AM     Lab Results   Component Value Date/Time    Cholesterol, total 151 05/16/2022 12:00 AM    HDL Cholesterol 89 05/16/2022 12:00 AM    LDL, calculated 53 05/16/2022 12:00 AM    LDL, calculated 107 (H) 07/06/2018 10:44 AM    VLDL, calculated 9 05/16/2022 12:00 AM    VLDL, calculated 12 07/06/2018 10:44 AM    Triglyceride 34 05/16/2022 12:00 AM    CHOL/HDL Ratio 2.1 07/06/2018 10:44 AM       Appearance: normal appearing weight and patient is at baseline with her current Alzheimer's dementia mental status as well as Parkinson's. General exam BP noted to be well controlled today in office, S1, S2 normal, no gallop, no murmur, chest clear, no JVD, no HSM, no edema. Lab review: Patient will get labs yearly. Disclaimer: The patient understands our medical plan. Alternatives have been explained and offered. The risks, benefits and significant side effects of all medications have been reviewed. Anticipated time course and progression of condition reviewed. All questions have been addressed. She is encouraged to employ the information provided in the after visit summary, which was reviewed. Where applicable, she is instructed to call the clinic if she has not been notified either by phone or through [address] with the results of her tests or with an appointment plan for any referrals within 1 week(s). No news is not good news; it's no news. The patient  is to call if her condition worsens or fails to improve or if significant side effects are experienced. Aspects of this note may have been generated using voice recognition software. Despite editing, there may be unrecognized errors.       Jef Walls NP     11/28/22

## 2022-11-28 NOTE — PATIENT INSTRUCTIONS

## 2022-11-28 NOTE — PROGRESS NOTES
This is the Subsequent Medicare Annual Wellness Exam, performed 12 months or more after the Initial AWV or the last Subsequent AWV    I have reviewed the patient's medical history in detail and updated the computerized patient record. Assessment/Plan   Education and counseling provided:  Are appropriate based on today's review and evaluation Pt has Alzheimer dementia and is being cared for by her daughter Aden Payne, who has POA    1. Medicare annual wellness visit, subsequent  2. Essential hypertension, benign  3. Mixed hyperlipidemia  4. Prediabetes  5. Screening for depression  -     DEPRESSION SCREEN ANNUAL       Depression Risk Factor Screening     3 most recent PHQ Screens 11/28/2022   PHQ Not Done -   Little interest or pleasure in doing things Not at all   Feeling down, depressed, irritable, or hopeless Not at all   Total Score PHQ 2 0       Alcohol & Drug Abuse Risk Screen    Do you average more than 1 drink per night or more than 7 drinks a week:  No    On any one occasion in the past three months have you have had more than 3 drinks containing alcohol:  No          Functional Ability and Level of Safety    Hearing:  her daughter states that she thinks her hearing has decreased over the last 2-4 weeks      Activities of Daily Living: The home contains: handrails, grab bars, and emergency strings for EMT response and  rescue  Patient needs help with:  transportation, shopping, preparing meals, laundry, housework, managing medications, eating, dressing, bathing, hygiene, and bathroom needs      Ambulation: with difficulty, uses a walker and wheelchair. Fall Risk:  Fall Risk Assessment, last 12 mths 11/28/2022   Able to walk? Yes   Fall in past 12 months? 0   Do you feel unsteady? 1   Are you worried about falling 1   Number of falls in past 12 months -   Fall with injury?  -      Abuse Screen:  Patient is not abused       Cognitive Screening    Has your family/caregiver stated any concerns about your memory: yes - Pt has [place] dementia, no longer seeing neurology, or taking medications     Cognitive Screening: Pt is not able to answer questions due to cognitive impairment    Health Maintenance Due     Health Maintenance Due   Topic Date Due    DTaP/Tdap/Td series (1 - Tdap) Never done    Colorectal Cancer Screening Combo  Never done    Shingrix Vaccine Age 50> (1 of 2) Never done    Breast Cancer Screen Mammogram  06/06/2013    Bone Densitometry (Dexa) Screening  Never done    COVID-19 Vaccine (2 - Moderna series) 11/23/2021    Flu Vaccine (1) 08/01/2022       Patient Care Team   Patient Care Team:  Avelino Dhillno NP as PCP - General (Nurse Practitioner)  Avelino Dhillon NP as PCP - [facility] Empaneled Provider    History     Patient Active Problem List   Diagnosis Code    Bradycardia R00.1    Parkinson's disease ([facility] [address] G20    Essential hypertension, benign I10    Depression F32. A    Mixed hyperlipidemia E78.2     Past Medical History:   Diagnosis Date    CVA (cerebral vascular accident) ([facility] [address]     distant past    Dementia ([address]     Depression     with atypical psychosis    Hyperlipidemia     Hypertension     Parkinson disease ([address]     with dementia      Past Surgical History:   Procedure Laterality Date    HX HYSTERECTOMY       Current Outpatient Medications   Medication Sig Dispense Refill    lisinopriL (PRINIVIL, ZESTRIL) 10 mg tablet TAKE ONE TABLET BY MOUTH EVERY DAY 90 Tablet 3    atorvastatin (LIPITOR) 40 mg tablet TAKE ONE TABLET EVERY DAY 90 Tablet 3    fluvoxaMINE (LUVOX) 50 mg tablet Take  by mouth nightly. risperidone (RISPERDAL) 2 mg tablet Take 2 mg by mouth two (2) times a day. varicella-zoster recombinant, PF, (Shingrix, PF,) 50 mcg/0.5 mL susr injection 0.5mL by IntraMUSCular route once now and then repeat in 2-6 months (Patient not taking: No sig reported) 0.5 mL 1    aspirin 81 mg chewable tablet Take 81 mg by mouth daily.  (Patient not taking: No sig reported)       Allergies Allergen Reactions    Ativan [Lorazepam] Other (comments)     Insomnia, visual hallucinations       Family History   Problem Relation Age of Onset    Hypertension Mother     Hypertension Father      Social History     Tobacco Use    Smoking status: Never    Smokeless tobacco: Never   Substance Use Topics    Alcohol use: No         Madai Frias NP

## 2022-12-05 ENCOUNTER — HOME CARE VISIT (OUTPATIENT)
Dept: SCHEDULING | Facility: HOME HEALTH | Age: 72
End: 2022-12-05
Payer: MEDICARE

## 2022-12-05 PROCEDURE — G0153 HHCP-SVS OF S/L PATH,EA 15MN: HCPCS

## 2022-12-06 VITALS
HEART RATE: 57 BPM | TEMPERATURE: 98.8 F | DIASTOLIC BLOOD PRESSURE: 78 MMHG | RESPIRATION RATE: 20 BRPM | OXYGEN SATURATION: 100 % | SYSTOLIC BLOOD PRESSURE: 162 MMHG

## 2022-12-06 NOTE — HOME HEALTH
RECENT HX OF CURRENT ILLNESS/REASON FOR REFERRAL:  Pt was referred to [facility] for ST eval per Kaiser Ramirez NP   Services to provide: Please evaluate patient for Speech therapy, and Dementia related swallowing difficulties. PLOF/DIET/COMMUNICATION:D with ADLs, meals, meds    PAST MEDICAL HISTORY:   Essential hypertension, benign   Mixed hyperlipidemia   Prediabetes    Dementia due to Parkinson's disease without behavioral disturbance (HCC)   Park[address]'s disease ([facility] [address] G20   Essential hypertension, benign I10   Depression F32. A   Mixed hype rlipidemia E78.2     CURRENT RESIDENCE/LIVING SITUATION: Lives with spouse who is blind, daughter and son in law come during the day and provide assistance, Pt and spouse are alone at nighttime      SUBJECTIVE (PT/FAMILY COMMENTS, THERAPIST OBSERVATIONS): Pt alert, sitting in chair. Daughter, April and her spouse present. April states that Pt previously received private care however due to inconsistency, April is now Pts full-time caregiver. April reports Pt had choking epsiode a week ago when eating a grape. \"I'm paranoid about giving her stuff like that now. \"  She further reports Pt was found with held sausage in mouth during Dr visit on Thursday. Sausage was from breakfast that morning. April states, \"Everything have to be cut up real fine\"    MEDICATIONS REVIEWED: Med reconciliation completed    CAREGIVER INVOLVEMENT: Daughter and son in law provides care during the day,     ASSESSMENT / [facility] / PLAN:  Pt presents with no current obvious clinical signs of acute dysphagia or aspiration with soft solids and thin liquids. Pt however with predisposing dysphagia risk factors that include dementia, Parkinson's disease and reduced physical mobility. Pt noted with other problems that increase risks including positioning when eating as Pt leans when sitting and poor portion control when self feeding when given finger foods.   SLP discussed and provided education on these risks and precautions to implement including sitting Pt fully upright during po intake using pillows, remaining upright for 30-45 minutes after po intake, slow rate of intake, small bite sizes, small single sips, providing oral care with toothbrush tid, alternating bites of food with drink. SLP also advised Pt receive IDDSI level 6 / soft and bite sized diet. Handout on recommended diet also provided to daughter,. Daughter verbalized understanding and declined further ST services for additional training/education. ST nemesio only completed. Axel New NP  was informed of ST POC and frequency.

## 2022-12-08 NOTE — CASE COMMUNICATION
Pt presents with no current obvious clinical signs of acute dysphagia or aspiration with soft solids and thin liquids. Pt however with predisposing dysphagia risk factors that include dementia, Parkinson's disease and reduced physical mobility. Pt noted with other problems that increase risks including positioning when eating as Pt leans when sitting and poor portion control when self feeding when given finger foods. SLP discussed and  provided education on these risks and precautions to implement including sitting Pt fully upright during po intake using pillows, remaining upright for 30-45 minutes after po intake, slow rate of intake, small bite sizes, small single sips, providing oral care with toothbrush tid, alternating bites of food with drink. SLP also advised Pt receive IDDSI level 6 / soft and bite sized diet. Handout on recommended diet also provided to Efra,. Daughter verbalized understanding and declined further ST services for additional training/education. ST nemesio only completed.

## 2022-12-23 ENCOUNTER — HOME CARE VISIT (OUTPATIENT)
Dept: HOME HEALTH SERVICES | Facility: HOME HEALTH | Age: 72
End: 2022-12-23
Payer: MEDICARE

## 2023-11-19 DIAGNOSIS — I10 ESSENTIAL (PRIMARY) HYPERTENSION: ICD-10-CM

## 2023-11-20 NOTE — TELEPHONE ENCOUNTER
Last Visit: 11- OV   Next Appointment: 01-  Previous Refill Encounter: 10- #90 tabs with 3 refills      Requested Prescriptions     Pending Prescriptions Disp Refills    lisinopril (PRINIVIL;ZESTRIL) 10 MG tablet [Pharmacy Med Name: lisinopril 10 mg tablet] 90 tablet 3     Sig: TAKE ONE TABLET BY MOUTH DAILY

## 2023-11-27 NOTE — TELEPHONE ENCOUNTER
Patient's daughter called to check status of refill for Lisinopril, adv will be forwarded to covering provider, states has been out of medication for a couple of days

## 2023-11-28 NOTE — TELEPHONE ENCOUNTER
Patient's daughter checking status of medication refill. Says her mom is completely out of the medication

## 2023-11-29 RX ORDER — LISINOPRIL 10 MG/1
10 TABLET ORAL DAILY
Qty: 60 TABLET | Refills: 0 | Status: SHIPPED | OUTPATIENT
Start: 2023-11-29 | End: 2024-01-19 | Stop reason: SDUPTHER

## 2023-11-29 NOTE — TELEPHONE ENCOUNTER
I sent in for 60 days as needs to update labs prior to me prescribing additional refills, bradley with [facility] 1/9/2024 plan to update labs then

## 2023-12-08 ENCOUNTER — TELEPHONE (OUTPATIENT)
Age: 73
End: 2023-12-08

## 2023-12-08 NOTE — TELEPHONE ENCOUNTER
----- Message from Mikaela Zepeda sent at 12/8/2023 10:35 AM EST -----  Subject: Appointment Request    Reason for Call: Established Patient Appointment needed: Routine (Patient   Request) No Script    QUESTIONS    Reason for appointment request? No appointments available during search     Additional Information for Provider? Pts daughter April is calling in   states she is needing to schedule an in office appointment asap for weight   loss. States they were at another appointment yesterday and the pt weighed   in at 79 lbs and was told to follow up with PCP.  Please advise.   ---------------------------------------------------------------------------  --------------  Jordan ANDERSON  [phone]; OK to leave message on voicemail  ---------------------------------------------------------------------------  --------------  SCRIPT ANSWERS

## 2024-01-09 PROBLEM — F02.80 DEMENTIA IN OTHER DISEASES CLASSIFIED ELSEWHERE, UNSPECIFIED SEVERITY, WITHOUT BEHAVIORAL DISTURBANCE, PSYCHOTIC DISTURBANCE, MOOD DISTURBANCE, AND ANXIETY (HCC): Status: ACTIVE | Noted: 2024-01-09

## 2024-01-19 ENCOUNTER — OFFICE VISIT (OUTPATIENT)
Age: 74
End: 2024-01-19

## 2024-01-19 VITALS
BODY MASS INDEX: 16.45 KG/M2 | HEIGHT: 60 IN | HEART RATE: 56 BPM | WEIGHT: 83.8 LBS | OXYGEN SATURATION: 96 % | DIASTOLIC BLOOD PRESSURE: 74 MMHG | SYSTOLIC BLOOD PRESSURE: 128 MMHG | RESPIRATION RATE: 16 BRPM

## 2024-01-19 DIAGNOSIS — F02.80 DEMENTIA IN OTHER DISEASES CLASSIFIED ELSEWHERE, UNSPECIFIED SEVERITY, WITHOUT BEHAVIORAL DISTURBANCE, PSYCHOTIC DISTURBANCE, MOOD DISTURBANCE, AND ANXIETY (HCC): ICD-10-CM

## 2024-01-19 DIAGNOSIS — Z00.00 MEDICARE ANNUAL WELLNESS VISIT, SUBSEQUENT: Primary | ICD-10-CM

## 2024-01-19 DIAGNOSIS — I10 ESSENTIAL (PRIMARY) HYPERTENSION: ICD-10-CM

## 2024-01-19 DIAGNOSIS — E78.2 MIXED HYPERLIPIDEMIA: ICD-10-CM

## 2024-01-19 DIAGNOSIS — R73.03 PREDIABETES: ICD-10-CM

## 2024-01-19 PROCEDURE — G0439 PPPS, SUBSEQ VISIT: HCPCS | Performed by: NURSE PRACTITIONER

## 2024-01-19 PROCEDURE — 99215 OFFICE O/P EST HI 40 MIN: CPT | Performed by: NURSE PRACTITIONER

## 2024-01-19 PROCEDURE — 1123F ACP DISCUSS/DSCN MKR DOCD: CPT | Performed by: NURSE PRACTITIONER

## 2024-01-19 PROCEDURE — 3074F SYST BP LT 130 MM HG: CPT | Performed by: NURSE PRACTITIONER

## 2024-01-19 PROCEDURE — 3078F DIAST BP <80 MM HG: CPT | Performed by: NURSE PRACTITIONER

## 2024-01-19 RX ORDER — LISINOPRIL 10 MG/1
10 TABLET ORAL DAILY
Qty: 90 TABLET | Refills: 3 | Status: SHIPPED | OUTPATIENT
Start: 2024-01-19

## 2024-01-19 RX ORDER — ATORVASTATIN CALCIUM 40 MG/1
40 TABLET, FILM COATED ORAL DAILY
Qty: 90 TABLET | Refills: 3 | Status: SHIPPED | OUTPATIENT
Start: 2024-01-19

## 2024-01-19 ASSESSMENT — PATIENT HEALTH QUESTIONNAIRE - PHQ9: DEPRESSION UNABLE TO ASSESS: FUNCTIONAL CAPACITY MOTIVATION LIMITS ACCURACY

## 2024-01-19 NOTE — PROGRESS NOTES
1. \"Have you been to the ER, urgent care clinic since your last visit?  Hospitalized since your last visit?\" No    2. \"Have you seen or consulted any other health care providers outside of the [facility] since your last visit?\" No     3. For patients aged 45-75: Has the patient had a colonoscopy / FIT/ Cologuard? No      If the patient is female:    4. For patients aged 40-74: Has the patient had a mammogram within the past 2 years? No      5. For patients aged 21-65: Has the patient had a pap smear? NA - based on age or sex

## 2024-01-19 NOTE — PROGRESS NOTES
Medicare Annual Wellness Visit    Eli Reddy is here for Hypertension, Cholesterol Problem, and Medicare AWV    Assessment & Plan   Medicare annual wellness visit, subsequent  Dementia in other diseases classified elsewhere, unspecified severity, without behavioral disturbance, psychotic disturbance, mood disturbance, and anxiety (HCC)  Assessment & Plan:   Monitored by specialist- no acute findings meriting change in the plan  Essential (primary) hypertension  -     lisinopril (PRINIVIL;ZESTRIL) 10 MG tablet; Take 1 tablet by mouth daily, Disp-90 tablet, R-3Normal  Mixed hyperlipidemia  -     atorvastatin (LIPITOR) 40 MG tablet; Take 1 tablet by mouth daily, Disp-90 tablet, R-3This prescription was filled on 10/21/2023. Any refills authorized will be placed on file.Normal  Prediabetes    Recommendations for Preventive Services Due: see orders and patient instructions/AVS.  Recommended screening schedule for the next 5-10 years is provided to the patient in written form: see Patient Instructions/AVS.     Return in about 1 year (around 1/19/2025) for HTN, HLD, Prediabetes, MyChart (VV).     Subjective   The following acute and/or chronic problems were also addressed today:  See Notes    Patient's complete Health Risk Assessment and screening values have been reviewed and are found in Flowsheets. The following problems were reviewed today and where indicated follow up appointments were made and/or referrals ordered.    Positive Risk Factor Screenings with Interventions:    Fall Risk:  Do you feel unsteady or are you worried about falling? : (!) yes  2 or more falls in past year?: (!) yes  Fall with injury in past year?: no     Interventions:    Reviewed medications, home hazards, visual acuity, and co-morbidities that can increase risk for falls  See AVS for additional education material     Depression:  Depression Unable to Assess: Functional capacity motivation limits accuracy    Interpretation:  5-9 mild

## 2024-01-19 NOTE — PATIENT INSTRUCTIONS
information.           Learning About Dental Care for Older Adults  Dental care for older adults: Overview  Dental care for older people is much the same as for younger adults. But older adults do have concerns that younger adults do not. Older adults may have problems with gum disease and decay on the roots of their teeth. They may need missing teeth replaced or broken fillings fixed. Or they may have dentures that need to be cared for. Some older adults may have trouble holding a toothbrush.  You can help remind the person you are caring for to brush and floss their teeth or to clean their dentures. In some cases, you may need to do the brushing and other dental care tasks. People who have trouble using their hands or who have dementia may need this extra help.  How can you help with dental care?  Normal dental care  To keep the teeth and gums healthy:  Brush the teeth with fluoride toothpaste twice a day--in the morning and at night--and floss at least once a day. Plaque can quickly build up on the teeth of older adults.  Watch for the signs of gum disease. These signs include gums that bleed after brushing or after eating hard foods, such as apples.  See a dentist regularly. Many experts recommend checkups every 6 months.  Keep the dentist up to date on any new medications the person is taking.  Encourage a balanced diet that includes whole grains, vegetables, and fruits, and that is low in saturated fat and sodium.  Encourage the person you're caring for not to use tobacco products. They can affect dental and general health.  Many older adults have a fixed income and feel that they can't afford dental care. But most [facility] and [facility] have programs in which dentists help older adults by lowering fees. Contact your area's public health offices or  for information about dental care in your area.  Using a toothbrush  Older adults with arthritis sometimes have trouble brushing their teeth because they

## 2024-01-19 NOTE — PROGRESS NOTES
Hypertension    Assessment/Plan:     hypertension needs further observation and needs to follow diet more regularly, hyperlipidemia needs further observation and needs to follow diet more regularly.    Lab results and schedule of future lab studies reviewed with patient.  Repeat labs ordered prior to next appointment.  Orders and follow up as documented in patient record.  Reviewed diet, exercise and weight control.  Recommended sodium restriction.  Copy of written low fat low cholesterol diet provided and reviewed.  Cardiovascular risk and specific lipid/LDL goals reviewed.  Reviewed medications and side effects in detail.  Reviewed potential future medication changes and side effects..     Eli was seen today for hypertension, cholesterol problem and medicare awv.    Diagnoses and all orders for this visit:    Medicare annual wellness visit, subsequent    Dementia in other diseases classified elsewhere, unspecified severity, without behavioral disturbance, psychotic disturbance, mood disturbance, and anxiety (HCC)    Essential (primary) hypertension  -     lisinopril (PRINIVIL;ZESTRIL) 10 MG tablet; Take 1 tablet by mouth daily    Mixed hyperlipidemia  -     atorvastatin (LIPITOR) 40 MG tablet; Take 1 tablet by mouth daily    Prediabetes         Return in about 1 year (around 1/19/2025) for HTN, HLD, Prediabetes, MyChart (VV).     Subjective:     Eli Reddy is a 73 y.o. Black /  female who presents for follow-up of hypertension. Pt denies any SOB, edema, CP, orthopnea, palpitations, nocturnal dyspnea, headaches, or blurred vision.     Diet and Lifestyle: generally follows a low fat low cholesterol diet, generally follows a low sodium diet, and sedentary  Home BP Monitoring: Is not measured at home    Cardiovascular ROS: taking medications as instructed, no medication side effects noted, no TIA's, no chest pain on exertion, no dyspnea on exertion, no swelling of ankles.

## 2024-05-13 PROBLEM — R13.10 DYSPHAGIA: Status: ACTIVE | Noted: 2024-05-13

## 2024-05-14 PROBLEM — E46 MALNUTRITION (HCC): Status: ACTIVE | Noted: 2024-05-14

## 2024-08-08 ENCOUNTER — TELEPHONE (OUTPATIENT)
Facility: CLINIC | Age: 74
End: 2024-08-08

## 2024-08-08 NOTE — TELEPHONE ENCOUNTER
Lilo calling from [facility] [employer] on behalf of [facility] to check status of medical records request. She says a request was faxed over on 07/31. I do not see a request sent to our office. Lilo says she will have it re faxed over today.